# Patient Record
Sex: MALE | Race: WHITE | NOT HISPANIC OR LATINO | Employment: UNEMPLOYED | URBAN - NONMETROPOLITAN AREA
[De-identification: names, ages, dates, MRNs, and addresses within clinical notes are randomized per-mention and may not be internally consistent; named-entity substitution may affect disease eponyms.]

---

## 2017-01-27 ENCOUNTER — APPOINTMENT (OUTPATIENT)
Dept: LAB | Facility: MEDICAL CENTER | Age: 52
End: 2017-01-27
Payer: MEDICARE

## 2017-01-27 ENCOUNTER — TRANSCRIBE ORDERS (OUTPATIENT)
Dept: LAB | Facility: MEDICAL CENTER | Age: 52
End: 2017-01-27

## 2017-01-27 ENCOUNTER — ALLSCRIPTS OFFICE VISIT (OUTPATIENT)
Dept: OTHER | Facility: OTHER | Age: 52
End: 2017-01-27

## 2017-01-27 DIAGNOSIS — R00.0 TACHYCARDIA: ICD-10-CM

## 2017-01-27 LAB
ANION GAP SERPL CALCULATED.3IONS-SCNC: 8 MMOL/L (ref 4–13)
BUN SERPL-MCNC: 11 MG/DL (ref 5–25)
CALCIUM SERPL-MCNC: 8.5 MG/DL (ref 8.3–10.1)
CHLORIDE SERPL-SCNC: 110 MMOL/L (ref 100–108)
CO2 SERPL-SCNC: 25 MMOL/L (ref 21–32)
CREAT SERPL-MCNC: 0.96 MG/DL (ref 0.6–1.3)
GFR SERPL CREATININE-BSD FRML MDRD: >60 ML/MIN/1.73SQ M
GLUCOSE SERPL-MCNC: 93 MG/DL (ref 65–140)
POTASSIUM SERPL-SCNC: 4 MMOL/L (ref 3.5–5.3)
SODIUM SERPL-SCNC: 143 MMOL/L (ref 136–145)
T3 SERPL-MCNC: 1.1 NG/ML (ref 0.6–1.8)
T4 FREE SERPL-MCNC: 0.99 NG/DL (ref 0.76–1.46)
TSH SERPL DL<=0.05 MIU/L-ACNC: 2.54 UIU/ML (ref 0.36–3.74)

## 2017-01-27 PROCEDURE — 86618 LYME DISEASE ANTIBODY: CPT

## 2017-01-27 PROCEDURE — 36415 COLL VENOUS BLD VENIPUNCTURE: CPT

## 2017-01-27 PROCEDURE — 84443 ASSAY THYROID STIM HORMONE: CPT

## 2017-01-27 PROCEDURE — 80048 BASIC METABOLIC PNL TOTAL CA: CPT

## 2017-01-27 PROCEDURE — 84439 ASSAY OF FREE THYROXINE: CPT

## 2017-01-27 PROCEDURE — 84480 ASSAY TRIIODOTHYRONINE (T3): CPT

## 2017-01-30 ENCOUNTER — GENERIC CONVERSION - ENCOUNTER (OUTPATIENT)
Dept: OTHER | Facility: OTHER | Age: 52
End: 2017-01-30

## 2017-01-30 LAB
B BURGDOR IGG SER IA-ACNC: 0.15
B BURGDOR IGM SER IA-ACNC: 0.46

## 2017-02-07 ENCOUNTER — HOSPITAL ENCOUNTER (OUTPATIENT)
Dept: NON INVASIVE DIAGNOSTICS | Facility: HOSPITAL | Age: 52
Discharge: HOME | End: 2017-02-07
Attending: FAMILY MEDICINE
Payer: MEDICARE

## 2017-02-07 ENCOUNTER — GENERIC CONVERSION - ENCOUNTER (OUTPATIENT)
Dept: OTHER | Facility: OTHER | Age: 52
End: 2017-02-07

## 2017-02-07 DIAGNOSIS — R00.0 TACHYCARDIA: ICD-10-CM

## 2017-02-07 PROCEDURE — 93226 XTRNL ECG REC<48 HR SCAN A/R: CPT

## 2017-02-07 PROCEDURE — 93306 TTE W/DOPPLER COMPLETE: CPT

## 2017-02-07 PROCEDURE — 93225 XTRNL ECG REC<48 HRS REC: CPT

## 2017-02-10 ENCOUNTER — ALLSCRIPTS OFFICE VISIT (OUTPATIENT)
Dept: OTHER | Facility: OTHER | Age: 52
End: 2017-02-10

## 2017-02-10 ENCOUNTER — GENERIC CONVERSION - ENCOUNTER (OUTPATIENT)
Dept: OTHER | Facility: OTHER | Age: 52
End: 2017-02-10

## 2017-02-24 ENCOUNTER — ALLSCRIPTS OFFICE VISIT (OUTPATIENT)
Dept: OTHER | Facility: OTHER | Age: 52
End: 2017-02-24

## 2017-03-05 ENCOUNTER — APPOINTMENT (EMERGENCY)
Dept: RADIOLOGY | Facility: HOSPITAL | Age: 52
End: 2017-03-05
Payer: MEDICARE

## 2017-03-05 ENCOUNTER — HOSPITAL ENCOUNTER (EMERGENCY)
Facility: HOSPITAL | Age: 52
Discharge: HOME | End: 2017-03-05
Attending: EMERGENCY MEDICINE
Payer: MEDICARE

## 2017-03-05 VITALS
OXYGEN SATURATION: 100 % | RESPIRATION RATE: 19 BRPM | DIASTOLIC BLOOD PRESSURE: 86 MMHG | TEMPERATURE: 97.7 F | HEART RATE: 99 BPM | WEIGHT: 211 LBS | BODY MASS INDEX: 34.06 KG/M2 | SYSTOLIC BLOOD PRESSURE: 128 MMHG

## 2017-03-05 DIAGNOSIS — F41.8 ANXIETY ABOUT HEALTH: ICD-10-CM

## 2017-03-05 DIAGNOSIS — R07.9 CHEST PAIN, UNSPECIFIED TYPE: Primary | ICD-10-CM

## 2017-03-05 DIAGNOSIS — R00.0 SINUS TACHYCARDIA: ICD-10-CM

## 2017-03-05 LAB
ALBUMIN SERPL BCP-MCNC: 4.5 G/DL (ref 3.5–5)
ALP SERPL-CCNC: 84 U/L (ref 46–116)
ALT SERPL W P-5'-P-CCNC: 51 U/L (ref 12–78)
ANION GAP SERPL CALCULATED.3IONS-SCNC: 15 MMOL/L (ref 4–13)
APTT PPP: 29 SECONDS (ref 24–36)
AST SERPL W P-5'-P-CCNC: 21 U/L (ref 5–45)
BASOPHILS # BLD AUTO: 0.04 THOUSANDS/ΜL (ref 0–0.1)
BASOPHILS NFR BLD AUTO: 1 % (ref 0–1)
BILIRUB SERPL-MCNC: 0.6 MG/DL (ref 0.2–1)
BUN SERPL-MCNC: 9 MG/DL (ref 5–25)
CALCIUM SERPL-MCNC: 9.4 MG/DL (ref 8.3–10.1)
CHLORIDE SERPL-SCNC: 104 MMOL/L (ref 100–108)
CO2 SERPL-SCNC: 25 MMOL/L (ref 21–32)
CREAT SERPL-MCNC: 1.02 MG/DL (ref 0.6–1.3)
DEPRECATED D DIMER PPP: <270 NG/ML (FEU) (ref 0–424)
EOSINOPHIL # BLD AUTO: 0.16 THOUSAND/ΜL (ref 0–0.61)
EOSINOPHIL NFR BLD AUTO: 2 % (ref 0–6)
ERYTHROCYTE [DISTWIDTH] IN BLOOD BY AUTOMATED COUNT: 12.9 % (ref 11.6–15.1)
ETHANOL SERPL-MCNC: 38 MG/DL (ref 0–3)
GFR SERPL CREATININE-BSD FRML MDRD: >60 ML/MIN/1.73SQ M
GLUCOSE SERPL-MCNC: 110 MG/DL (ref 65–140)
GLUCOSE SERPL-MCNC: 126 MG/DL (ref 65–140)
HCT VFR BLD AUTO: 47.7 % (ref 36.5–49.3)
HGB BLD-MCNC: 16.5 G/DL (ref 12–17)
HOLD SPECIMEN: NORMAL
INR PPP: 1.05 (ref 0.86–1.16)
LYMPHOCYTES # BLD AUTO: 2.39 THOUSANDS/ΜL (ref 0.6–4.47)
LYMPHOCYTES NFR BLD AUTO: 31 % (ref 14–44)
MCH RBC QN AUTO: 29.6 PG (ref 26.8–34.3)
MCHC RBC AUTO-ENTMCNC: 34.6 G/DL (ref 31.4–37.4)
MCV RBC AUTO: 86 FL (ref 82–98)
MONOCYTES # BLD AUTO: 0.82 THOUSAND/ΜL (ref 0.17–1.22)
MONOCYTES NFR BLD AUTO: 11 % (ref 4–12)
NEUTROPHILS # BLD AUTO: 4.24 THOUSANDS/ΜL (ref 1.85–7.62)
NEUTS SEG NFR BLD AUTO: 55 % (ref 43–75)
PLATELET # BLD AUTO: 239 THOUSANDS/UL (ref 149–390)
PMV BLD AUTO: 10.4 FL (ref 8.9–12.7)
POTASSIUM SERPL-SCNC: 3.6 MMOL/L (ref 3.5–5.3)
PROT SERPL-MCNC: 8.9 G/DL (ref 6.4–8.2)
PROTHROMBIN TIME: 13.4 SECONDS (ref 12–14.3)
RBC # BLD AUTO: 5.58 MILLION/UL (ref 3.88–5.62)
SODIUM SERPL-SCNC: 144 MMOL/L (ref 136–145)
TROPONIN I SERPL-MCNC: <0.02 NG/ML
WBC # BLD AUTO: 7.65 THOUSAND/UL (ref 4.31–10.16)

## 2017-03-05 PROCEDURE — 99285 EMERGENCY DEPT VISIT HI MDM: CPT

## 2017-03-05 PROCEDURE — 71020 HB CHEST X-RAY 2VW FRONTAL&LATL: CPT

## 2017-03-05 PROCEDURE — 85025 COMPLETE CBC W/AUTO DIFF WBC: CPT | Performed by: EMERGENCY MEDICINE

## 2017-03-05 PROCEDURE — 96361 HYDRATE IV INFUSION ADD-ON: CPT

## 2017-03-05 PROCEDURE — 36415 COLL VENOUS BLD VENIPUNCTURE: CPT | Performed by: EMERGENCY MEDICINE

## 2017-03-05 PROCEDURE — 96365 THER/PROPH/DIAG IV INF INIT: CPT

## 2017-03-05 PROCEDURE — 85730 THROMBOPLASTIN TIME PARTIAL: CPT | Performed by: EMERGENCY MEDICINE

## 2017-03-05 PROCEDURE — 80053 COMPREHEN METABOLIC PANEL: CPT | Performed by: EMERGENCY MEDICINE

## 2017-03-05 PROCEDURE — 96375 TX/PRO/DX INJ NEW DRUG ADDON: CPT

## 2017-03-05 PROCEDURE — 84484 ASSAY OF TROPONIN QUANT: CPT | Performed by: EMERGENCY MEDICINE

## 2017-03-05 PROCEDURE — 82948 REAGENT STRIP/BLOOD GLUCOSE: CPT

## 2017-03-05 PROCEDURE — 93005 ELECTROCARDIOGRAM TRACING: CPT | Performed by: EMERGENCY MEDICINE

## 2017-03-05 PROCEDURE — 80320 DRUG SCREEN QUANTALCOHOLS: CPT | Performed by: EMERGENCY MEDICINE

## 2017-03-05 PROCEDURE — 85379 FIBRIN DEGRADATION QUANT: CPT | Performed by: EMERGENCY MEDICINE

## 2017-03-05 PROCEDURE — 85610 PROTHROMBIN TIME: CPT | Performed by: EMERGENCY MEDICINE

## 2017-03-05 RX ORDER — LORAZEPAM 2 MG/ML
0.5 INJECTION INTRAMUSCULAR ONCE
Status: COMPLETED | OUTPATIENT
Start: 2017-03-05 | End: 2017-03-05

## 2017-03-05 RX ORDER — SODIUM CHLORIDE 9 MG/ML
125 INJECTION, SOLUTION INTRAVENOUS CONTINUOUS
Status: DISCONTINUED | OUTPATIENT
Start: 2017-03-05 | End: 2017-03-05 | Stop reason: HOSPADM

## 2017-03-05 RX ORDER — ONDANSETRON 2 MG/ML
4 INJECTION INTRAMUSCULAR; INTRAVENOUS ONCE
Status: COMPLETED | OUTPATIENT
Start: 2017-03-05 | End: 2017-03-05

## 2017-03-05 RX ORDER — OMEPRAZOLE 40 MG/1
40 CAPSULE, DELAYED RELEASE ORAL DAILY
COMMUNITY
End: 2017-05-01

## 2017-03-05 RX ORDER — THIAMINE HYDROCHLORIDE 100 MG/ML
100 INJECTION, SOLUTION INTRAMUSCULAR; INTRAVENOUS ONCE
Status: DISCONTINUED | OUTPATIENT
Start: 2017-03-05 | End: 2017-03-05

## 2017-03-05 RX ADMIN — THIAMINE HYDROCHLORIDE 100 MG: 100 INJECTION, SOLUTION INTRAMUSCULAR; INTRAVENOUS at 08:23

## 2017-03-05 RX ADMIN — LORAZEPAM 0.5 MG: 2 INJECTION INTRAMUSCULAR; INTRAVENOUS at 08:05

## 2017-03-05 RX ADMIN — ONDANSETRON 4 MG: 2 INJECTION INTRAMUSCULAR; INTRAVENOUS at 07:54

## 2017-03-05 RX ADMIN — SODIUM CHLORIDE 125 ML/HR: 0.9 INJECTION, SOLUTION INTRAVENOUS at 07:54

## 2017-03-06 ENCOUNTER — HOSPITAL ENCOUNTER (EMERGENCY)
Facility: HOSPITAL | Age: 52
Discharge: HOME | End: 2017-03-06
Attending: EMERGENCY MEDICINE | Admitting: EMERGENCY MEDICINE
Payer: MEDICARE

## 2017-03-06 VITALS
RESPIRATION RATE: 18 BRPM | HEART RATE: 68 BPM | DIASTOLIC BLOOD PRESSURE: 76 MMHG | SYSTOLIC BLOOD PRESSURE: 148 MMHG | OXYGEN SATURATION: 100 % | BODY MASS INDEX: 33.45 KG/M2 | TEMPERATURE: 98.6 F | HEIGHT: 66 IN | WEIGHT: 208.11 LBS

## 2017-03-06 DIAGNOSIS — F41.9 ANXIETY: Primary | ICD-10-CM

## 2017-03-06 LAB
ANION GAP SERPL CALCULATED.3IONS-SCNC: 12 MMOL/L (ref 4–13)
ATRIAL RATE: 118 BPM
BASOPHILS # BLD AUTO: 0.04 THOUSANDS/ΜL (ref 0–0.1)
BASOPHILS NFR BLD AUTO: 1 % (ref 0–1)
BUN SERPL-MCNC: 12 MG/DL (ref 5–25)
CALCIUM SERPL-MCNC: 9.8 MG/DL (ref 8.3–10.1)
CHLORIDE SERPL-SCNC: 105 MMOL/L (ref 100–108)
CO2 SERPL-SCNC: 25 MMOL/L (ref 21–32)
CREAT SERPL-MCNC: 1.04 MG/DL (ref 0.6–1.3)
EOSINOPHIL # BLD AUTO: 0.28 THOUSAND/ΜL (ref 0–0.61)
EOSINOPHIL NFR BLD AUTO: 4 % (ref 0–6)
ERYTHROCYTE [DISTWIDTH] IN BLOOD BY AUTOMATED COUNT: 13.2 % (ref 11.6–15.1)
GFR SERPL CREATININE-BSD FRML MDRD: >60 ML/MIN/1.73SQ M
GLUCOSE SERPL-MCNC: 106 MG/DL (ref 65–140)
GLUCOSE SERPL-MCNC: 108 MG/DL (ref 65–140)
HCT VFR BLD AUTO: 48.4 % (ref 36.5–49.3)
HGB BLD-MCNC: 16.5 G/DL (ref 12–17)
LYMPHOCYTES # BLD AUTO: 2.37 THOUSANDS/ΜL (ref 0.6–4.47)
LYMPHOCYTES NFR BLD AUTO: 31 % (ref 14–44)
MCH RBC QN AUTO: 29.5 PG (ref 26.8–34.3)
MCHC RBC AUTO-ENTMCNC: 34.1 G/DL (ref 31.4–37.4)
MCV RBC AUTO: 86 FL (ref 82–98)
MONOCYTES # BLD AUTO: 0.92 THOUSAND/ΜL (ref 0.17–1.22)
MONOCYTES NFR BLD AUTO: 12 % (ref 4–12)
NEUTROPHILS # BLD AUTO: 4.15 THOUSANDS/ΜL (ref 1.85–7.62)
NEUTS SEG NFR BLD AUTO: 52 % (ref 43–75)
P AXIS: 59 DEGREES
PLATELET # BLD AUTO: 202 THOUSANDS/UL (ref 149–390)
PMV BLD AUTO: 10.9 FL (ref 8.9–12.7)
POTASSIUM SERPL-SCNC: 5.4 MMOL/L (ref 3.5–5.3)
PR INTERVAL: 168 MS
QRS AXIS: 56 DEGREES
QRSD INTERVAL: 70 MS
QT INTERVAL: 316 MS
QTC INTERVAL: 442 MS
RBC # BLD AUTO: 5.6 MILLION/UL (ref 3.88–5.62)
SODIUM SERPL-SCNC: 142 MMOL/L (ref 136–145)
T WAVE AXIS: 57 DEGREES
TROPONIN I SERPL-MCNC: <0.02 NG/ML
TSH SERPL DL<=0.05 MIU/L-ACNC: 2.35 UIU/ML (ref 0.36–3.74)
VENTRICULAR RATE: 118 BPM
WBC # BLD AUTO: 7.76 THOUSAND/UL (ref 4.31–10.16)

## 2017-03-06 PROCEDURE — 84484 ASSAY OF TROPONIN QUANT: CPT | Performed by: EMERGENCY MEDICINE

## 2017-03-06 PROCEDURE — 93005 ELECTROCARDIOGRAM TRACING: CPT | Performed by: EMERGENCY MEDICINE

## 2017-03-06 PROCEDURE — 80048 BASIC METABOLIC PNL TOTAL CA: CPT | Performed by: EMERGENCY MEDICINE

## 2017-03-06 PROCEDURE — 99285 EMERGENCY DEPT VISIT HI MDM: CPT

## 2017-03-06 PROCEDURE — 82948 REAGENT STRIP/BLOOD GLUCOSE: CPT

## 2017-03-06 PROCEDURE — 96374 THER/PROPH/DIAG INJ IV PUSH: CPT

## 2017-03-06 PROCEDURE — 85025 COMPLETE CBC W/AUTO DIFF WBC: CPT | Performed by: EMERGENCY MEDICINE

## 2017-03-06 PROCEDURE — 84443 ASSAY THYROID STIM HORMONE: CPT | Performed by: EMERGENCY MEDICINE

## 2017-03-06 PROCEDURE — 36415 COLL VENOUS BLD VENIPUNCTURE: CPT | Performed by: EMERGENCY MEDICINE

## 2017-03-06 RX ORDER — LORAZEPAM 2 MG/ML
1 INJECTION INTRAMUSCULAR ONCE
Status: COMPLETED | OUTPATIENT
Start: 2017-03-06 | End: 2017-03-06

## 2017-03-06 RX ORDER — LORAZEPAM 1 MG/1
1 TABLET ORAL EVERY 6 HOURS PRN
Qty: 20 TABLET | Refills: 0 | Status: SHIPPED | OUTPATIENT
Start: 2017-03-06 | End: 2017-05-01

## 2017-03-06 RX ADMIN — LORAZEPAM 1 MG: 2 INJECTION, SOLUTION INTRAMUSCULAR; INTRAVENOUS at 14:39

## 2017-03-07 ENCOUNTER — ALLSCRIPTS OFFICE VISIT (OUTPATIENT)
Dept: OTHER | Facility: OTHER | Age: 52
End: 2017-03-07

## 2017-03-07 DIAGNOSIS — R73.09 OTHER ABNORMAL GLUCOSE: ICD-10-CM

## 2017-03-07 DIAGNOSIS — Z12.5 ENCOUNTER FOR SCREENING FOR MALIGNANT NEOPLASM OF PROSTATE: ICD-10-CM

## 2017-03-07 DIAGNOSIS — E78.5 HYPERLIPIDEMIA: ICD-10-CM

## 2017-03-07 LAB
ATRIAL RATE: 77 BPM
P AXIS: 59 DEGREES
PR INTERVAL: 154 MS
QRS AXIS: 79 DEGREES
QRSD INTERVAL: 80 MS
QT INTERVAL: 384 MS
QTC INTERVAL: 434 MS
T WAVE AXIS: 16 DEGREES
VENTRICULAR RATE: 77 BPM

## 2017-03-08 ENCOUNTER — ALLSCRIPTS OFFICE VISIT (OUTPATIENT)
Dept: OTHER | Facility: OTHER | Age: 52
End: 2017-03-08

## 2017-03-08 ENCOUNTER — HOSPITAL ENCOUNTER (EMERGENCY)
Facility: HOSPITAL | Age: 52
Discharge: HOME | End: 2017-03-08
Attending: EMERGENCY MEDICINE | Admitting: EMERGENCY MEDICINE
Payer: MEDICARE

## 2017-03-08 VITALS
SYSTOLIC BLOOD PRESSURE: 108 MMHG | HEART RATE: 51 BPM | OXYGEN SATURATION: 99 % | HEIGHT: 66 IN | WEIGHT: 211 LBS | RESPIRATION RATE: 16 BRPM | BODY MASS INDEX: 33.91 KG/M2 | DIASTOLIC BLOOD PRESSURE: 66 MMHG | TEMPERATURE: 98.7 F

## 2017-03-08 DIAGNOSIS — F41.0 PANIC DISORDER: Primary | ICD-10-CM

## 2017-03-08 LAB
ANION GAP SERPL CALCULATED.3IONS-SCNC: 10 MMOL/L (ref 4–13)
BASOPHILS # BLD AUTO: 0.07 THOUSANDS/ΜL (ref 0–0.1)
BASOPHILS NFR BLD AUTO: 1 % (ref 0–1)
BUN SERPL-MCNC: 15 MG/DL (ref 5–25)
CALCIUM SERPL-MCNC: 9.2 MG/DL (ref 8.3–10.1)
CHLORIDE SERPL-SCNC: 107 MMOL/L (ref 100–108)
CO2 SERPL-SCNC: 26 MMOL/L (ref 21–32)
CREAT SERPL-MCNC: 1.12 MG/DL (ref 0.6–1.3)
EOSINOPHIL # BLD AUTO: 0.35 THOUSAND/ΜL (ref 0–0.61)
EOSINOPHIL NFR BLD AUTO: 5 % (ref 0–6)
ERYTHROCYTE [DISTWIDTH] IN BLOOD BY AUTOMATED COUNT: 13.1 % (ref 11.6–15.1)
GFR SERPL CREATININE-BSD FRML MDRD: >60 ML/MIN/1.73SQ M
GLUCOSE SERPL-MCNC: 112 MG/DL (ref 65–140)
HCT VFR BLD AUTO: 45.8 % (ref 36.5–49.3)
HGB BLD-MCNC: 15.5 G/DL (ref 12–17)
HOLD SPECIMEN: NORMAL
LYMPHOCYTES # BLD AUTO: 2.73 THOUSANDS/ΜL (ref 0.6–4.47)
LYMPHOCYTES NFR BLD AUTO: 37 % (ref 14–44)
MAGNESIUM SERPL-MCNC: 2.1 MG/DL (ref 1.6–2.6)
MCH RBC QN AUTO: 29.8 PG (ref 26.8–34.3)
MCHC RBC AUTO-ENTMCNC: 33.8 G/DL (ref 31.4–37.4)
MCV RBC AUTO: 88 FL (ref 82–98)
MONOCYTES # BLD AUTO: 1 THOUSAND/ΜL (ref 0.17–1.22)
MONOCYTES NFR BLD AUTO: 14 % (ref 4–12)
NEUTROPHILS # BLD AUTO: 3.27 THOUSANDS/ΜL (ref 1.85–7.62)
NEUTS SEG NFR BLD AUTO: 43 % (ref 43–75)
PHOSPHATE SERPL-MCNC: 2.9 MG/DL (ref 2.7–4.5)
PLATELET # BLD AUTO: 193 THOUSANDS/UL (ref 149–390)
PMV BLD AUTO: 10.8 FL (ref 8.9–12.7)
POTASSIUM SERPL-SCNC: 4.3 MMOL/L (ref 3.5–5.3)
RBC # BLD AUTO: 5.21 MILLION/UL (ref 3.88–5.62)
SODIUM SERPL-SCNC: 143 MMOL/L (ref 136–145)
T4 FREE SERPL-MCNC: 0.96 NG/DL (ref 0.76–1.46)
TROPONIN I SERPL-MCNC: <0.02 NG/ML
WBC # BLD AUTO: 7.42 THOUSAND/UL (ref 4.31–10.16)

## 2017-03-08 PROCEDURE — 83735 ASSAY OF MAGNESIUM: CPT | Performed by: EMERGENCY MEDICINE

## 2017-03-08 PROCEDURE — 84439 ASSAY OF FREE THYROXINE: CPT | Performed by: EMERGENCY MEDICINE

## 2017-03-08 PROCEDURE — 84484 ASSAY OF TROPONIN QUANT: CPT | Performed by: EMERGENCY MEDICINE

## 2017-03-08 PROCEDURE — 36415 COLL VENOUS BLD VENIPUNCTURE: CPT

## 2017-03-08 PROCEDURE — 84100 ASSAY OF PHOSPHORUS: CPT | Performed by: EMERGENCY MEDICINE

## 2017-03-08 PROCEDURE — 85025 COMPLETE CBC W/AUTO DIFF WBC: CPT | Performed by: EMERGENCY MEDICINE

## 2017-03-08 PROCEDURE — 99285 EMERGENCY DEPT VISIT HI MDM: CPT

## 2017-03-08 PROCEDURE — 93005 ELECTROCARDIOGRAM TRACING: CPT

## 2017-03-08 PROCEDURE — 80048 BASIC METABOLIC PNL TOTAL CA: CPT | Performed by: EMERGENCY MEDICINE

## 2017-03-08 RX ORDER — LORAZEPAM 2 MG/ML
1 INJECTION INTRAMUSCULAR ONCE
Status: DISCONTINUED | OUTPATIENT
Start: 2017-03-08 | End: 2017-03-08 | Stop reason: HOSPADM

## 2017-03-09 LAB
ATRIAL RATE: 58 BPM
P AXIS: 45 DEGREES
PR INTERVAL: 156 MS
QRS AXIS: 47 DEGREES
QRSD INTERVAL: 82 MS
QT INTERVAL: 424 MS
QTC INTERVAL: 416 MS
T WAVE AXIS: 42 DEGREES
VENTRICULAR RATE: 58 BPM

## 2017-03-30 ENCOUNTER — ANESTHESIA EVENT (OUTPATIENT)
Dept: NON INVASIVE DIAGNOSTICS | Facility: HOSPITAL | Age: 52
End: 2017-03-30
Payer: MEDICARE

## 2017-03-31 ENCOUNTER — HOSPITAL ENCOUNTER (OUTPATIENT)
Dept: NON INVASIVE DIAGNOSTICS | Facility: HOSPITAL | Age: 52
Discharge: HOME | End: 2017-03-31
Attending: INTERNAL MEDICINE | Admitting: INTERNAL MEDICINE
Payer: MEDICARE

## 2017-03-31 ENCOUNTER — ANESTHESIA (OUTPATIENT)
Dept: NON INVASIVE DIAGNOSTICS | Facility: HOSPITAL | Age: 52
End: 2017-03-31
Payer: MEDICARE

## 2017-03-31 VITALS
RESPIRATION RATE: 16 BRPM | OXYGEN SATURATION: 96 % | BODY MASS INDEX: 33.91 KG/M2 | WEIGHT: 211 LBS | SYSTOLIC BLOOD PRESSURE: 116 MMHG | HEIGHT: 66 IN | HEART RATE: 88 BPM | DIASTOLIC BLOOD PRESSURE: 84 MMHG | TEMPERATURE: 98.1 F

## 2017-03-31 DIAGNOSIS — I47.1 SVT (SUPRAVENTRICULAR TACHYCARDIA) (HCC): ICD-10-CM

## 2017-03-31 LAB
ANION GAP SERPL CALCULATED.3IONS-SCNC: 7 MMOL/L (ref 4–13)
ATRIAL RATE: 57 BPM
BASOPHILS # BLD AUTO: 0.05 THOUSANDS/ΜL (ref 0–0.1)
BASOPHILS NFR BLD AUTO: 1 % (ref 0–1)
BUN SERPL-MCNC: 17 MG/DL (ref 5–25)
CALCIUM SERPL-MCNC: 8.9 MG/DL (ref 8.3–10.1)
CHLORIDE SERPL-SCNC: 109 MMOL/L (ref 100–108)
CO2 SERPL-SCNC: 27 MMOL/L (ref 21–32)
CREAT SERPL-MCNC: 1.01 MG/DL (ref 0.6–1.3)
EOSINOPHIL # BLD AUTO: 0.4 THOUSAND/ΜL (ref 0–0.61)
EOSINOPHIL NFR BLD AUTO: 6 % (ref 0–6)
ERYTHROCYTE [DISTWIDTH] IN BLOOD BY AUTOMATED COUNT: 13.4 % (ref 11.6–15.1)
GFR SERPL CREATININE-BSD FRML MDRD: >60 ML/MIN/1.73SQ M
GLUCOSE P FAST SERPL-MCNC: 107 MG/DL (ref 65–99)
GLUCOSE SERPL-MCNC: 107 MG/DL (ref 65–140)
HCT VFR BLD AUTO: 41.9 % (ref 36.5–49.3)
HGB BLD-MCNC: 14.1 G/DL (ref 12–17)
LYMPHOCYTES # BLD AUTO: 2.35 THOUSANDS/ΜL (ref 0.6–4.47)
LYMPHOCYTES NFR BLD AUTO: 32 % (ref 14–44)
MAGNESIUM SERPL-MCNC: 2.5 MG/DL (ref 1.6–2.6)
MCH RBC QN AUTO: 29.4 PG (ref 26.8–34.3)
MCHC RBC AUTO-ENTMCNC: 33.7 G/DL (ref 31.4–37.4)
MCV RBC AUTO: 88 FL (ref 82–98)
MONOCYTES # BLD AUTO: 0.91 THOUSAND/ΜL (ref 0.17–1.22)
MONOCYTES NFR BLD AUTO: 13 % (ref 4–12)
NEUTROPHILS # BLD AUTO: 3.53 THOUSANDS/ΜL (ref 1.85–7.62)
NEUTS SEG NFR BLD AUTO: 48 % (ref 43–75)
NRBC BLD AUTO-RTO: 0 /100 WBCS
P AXIS: 40 DEGREES
PLATELET # BLD AUTO: 205 THOUSANDS/UL (ref 149–390)
PMV BLD AUTO: 10.3 FL (ref 8.9–12.7)
POTASSIUM SERPL-SCNC: 4.5 MMOL/L (ref 3.5–5.3)
PR INTERVAL: 164 MS
QRS AXIS: 21 DEGREES
QRSD INTERVAL: 84 MS
QT INTERVAL: 430 MS
QTC INTERVAL: 418 MS
RBC # BLD AUTO: 4.79 MILLION/UL (ref 3.88–5.62)
SODIUM SERPL-SCNC: 143 MMOL/L (ref 136–145)
T WAVE AXIS: 26 DEGREES
VENTRICULAR RATE: 57 BPM
WBC # BLD AUTO: 7.27 THOUSAND/UL (ref 4.31–10.16)

## 2017-03-31 PROCEDURE — C1730 CATH, EP, 19 OR FEW ELECT: HCPCS | Performed by: INTERNAL MEDICINE

## 2017-03-31 PROCEDURE — 80048 BASIC METABOLIC PNL TOTAL CA: CPT | Performed by: PHYSICIAN ASSISTANT

## 2017-03-31 PROCEDURE — 93623 PRGRMD STIMJ&PACG IV RX NFS: CPT | Performed by: INTERNAL MEDICINE

## 2017-03-31 PROCEDURE — 93621 COMP EP EVL L PAC&REC C SINS: CPT | Performed by: INTERNAL MEDICINE

## 2017-03-31 PROCEDURE — 85025 COMPLETE CBC W/AUTO DIFF WBC: CPT | Performed by: PHYSICIAN ASSISTANT

## 2017-03-31 PROCEDURE — C1733 CATH, EP, OTHR THAN COOL-TIP: HCPCS | Performed by: INTERNAL MEDICINE

## 2017-03-31 PROCEDURE — 93005 ELECTROCARDIOGRAM TRACING: CPT | Performed by: PHYSICIAN ASSISTANT

## 2017-03-31 PROCEDURE — C1894 INTRO/SHEATH, NON-LASER: HCPCS | Performed by: INTERNAL MEDICINE

## 2017-03-31 PROCEDURE — C1893 INTRO/SHEATH, FIXED,NON-PEEL: HCPCS | Performed by: INTERNAL MEDICINE

## 2017-03-31 PROCEDURE — 83735 ASSAY OF MAGNESIUM: CPT | Performed by: PHYSICIAN ASSISTANT

## 2017-03-31 PROCEDURE — 93613 INTRACARDIAC EPHYS 3D MAPG: CPT | Performed by: INTERNAL MEDICINE

## 2017-03-31 PROCEDURE — 93653 COMPRE EP EVAL TX SVT: CPT | Performed by: INTERNAL MEDICINE

## 2017-03-31 RX ORDER — LIDOCAINE HYDROCHLORIDE 10 MG/ML
INJECTION, SOLUTION INFILTRATION; PERINEURAL CODE/TRAUMA/SEDATION MEDICATION
Status: COMPLETED | OUTPATIENT
Start: 2017-03-31 | End: 2017-03-31

## 2017-03-31 RX ORDER — PROPOFOL 10 MG/ML
INJECTION, EMULSION INTRAVENOUS AS NEEDED
Status: DISCONTINUED | OUTPATIENT
Start: 2017-03-31 | End: 2017-03-31 | Stop reason: SURG

## 2017-03-31 RX ORDER — MIDAZOLAM HYDROCHLORIDE 1 MG/ML
INJECTION INTRAMUSCULAR; INTRAVENOUS AS NEEDED
Status: DISCONTINUED | OUTPATIENT
Start: 2017-03-31 | End: 2017-03-31 | Stop reason: SURG

## 2017-03-31 RX ORDER — KETAMINE HYDROCHLORIDE 50 MG/ML
INJECTION, SOLUTION, CONCENTRATE INTRAMUSCULAR; INTRAVENOUS AS NEEDED
Status: DISCONTINUED | OUTPATIENT
Start: 2017-03-31 | End: 2017-03-31 | Stop reason: SURG

## 2017-03-31 RX ORDER — GLYCOPYRROLATE 0.2 MG/ML
INJECTION INTRAMUSCULAR; INTRAVENOUS AS NEEDED
Status: DISCONTINUED | OUTPATIENT
Start: 2017-03-31 | End: 2017-03-31 | Stop reason: SURG

## 2017-03-31 RX ORDER — PROPOFOL 10 MG/ML
INJECTION, EMULSION INTRAVENOUS CONTINUOUS PRN
Status: DISCONTINUED | OUTPATIENT
Start: 2017-03-31 | End: 2017-03-31 | Stop reason: SURG

## 2017-03-31 RX ORDER — ACETAMINOPHEN 325 MG/1
650 TABLET ORAL EVERY 4 HOURS PRN
Status: DISCONTINUED | OUTPATIENT
Start: 2017-03-31 | End: 2017-03-31 | Stop reason: HOSPADM

## 2017-03-31 RX ORDER — SODIUM CHLORIDE 9 MG/ML
50 INJECTION, SOLUTION INTRAVENOUS CONTINUOUS
Status: DISCONTINUED | OUTPATIENT
Start: 2017-03-31 | End: 2017-03-31 | Stop reason: HOSPADM

## 2017-03-31 RX ORDER — CLORAZEPATE DIPOTASSIUM 7.5 MG/1
7.5 TABLET ORAL 2 TIMES DAILY
COMMUNITY
End: 2017-05-01

## 2017-03-31 RX ORDER — OXYCODONE HYDROCHLORIDE AND ACETAMINOPHEN 5; 325 MG/1; MG/1
1 TABLET ORAL EVERY 4 HOURS PRN
Status: DISCONTINUED | OUTPATIENT
Start: 2017-03-31 | End: 2017-03-31 | Stop reason: HOSPADM

## 2017-03-31 RX ADMIN — KETAMINE HYDROCHLORIDE 10 MG: 50 INJECTION, SOLUTION INTRAMUSCULAR; INTRAVENOUS at 09:10

## 2017-03-31 RX ADMIN — LIDOCAINE HYDROCHLORIDE 8 ML: 10 INJECTION, SOLUTION INFILTRATION; PERINEURAL at 08:19

## 2017-03-31 RX ADMIN — KETAMINE HYDROCHLORIDE 10 MG: 50 INJECTION, SOLUTION INTRAMUSCULAR; INTRAVENOUS at 08:25

## 2017-03-31 RX ADMIN — GLYCOPYRROLATE 0.1 MG: 0.2 INJECTION INTRAMUSCULAR; INTRAVENOUS at 08:03

## 2017-03-31 RX ADMIN — MIDAZOLAM HYDROCHLORIDE 2 MG: 1 INJECTION, SOLUTION INTRAMUSCULAR; INTRAVENOUS at 08:02

## 2017-03-31 RX ADMIN — PROPOFOL 25 MCG/KG/MIN: 10 INJECTION, EMULSION INTRAVENOUS at 08:03

## 2017-03-31 RX ADMIN — KETAMINE HYDROCHLORIDE 10 MG: 50 INJECTION, SOLUTION INTRAMUSCULAR; INTRAVENOUS at 09:34

## 2017-03-31 RX ADMIN — SODIUM CHLORIDE: 0.9 INJECTION, SOLUTION INTRAVENOUS at 09:37

## 2017-03-31 RX ADMIN — KETAMINE HYDROCHLORIDE 5 MG: 50 INJECTION, SOLUTION INTRAMUSCULAR; INTRAVENOUS at 09:05

## 2017-03-31 RX ADMIN — PROPOFOL 40 MG: 10 INJECTION, EMULSION INTRAVENOUS at 09:34

## 2017-03-31 RX ADMIN — DEXTROSE 2 MCG/MIN: 5 SOLUTION INTRAVENOUS at 08:53

## 2017-03-31 RX ADMIN — PROPOFOL 100 MG: 10 INJECTION, EMULSION INTRAVENOUS at 08:03

## 2017-03-31 RX ADMIN — KETAMINE HYDROCHLORIDE 15 MG: 50 INJECTION, SOLUTION INTRAMUSCULAR; INTRAVENOUS at 08:03

## 2017-03-31 RX ADMIN — SODIUM CHLORIDE 50 ML/HR: 0.9 INJECTION, SOLUTION INTRAVENOUS at 07:26

## 2017-03-31 RX ADMIN — KETAMINE HYDROCHLORIDE 50 MG: 50 INJECTION, SOLUTION INTRAMUSCULAR; INTRAVENOUS at 09:59

## 2017-03-31 RX ADMIN — LIDOCAINE HYDROCHLORIDE 9 ML: 10 INJECTION, SOLUTION INFILTRATION; PERINEURAL at 08:19

## 2017-04-04 ENCOUNTER — ALLSCRIPTS OFFICE VISIT (OUTPATIENT)
Dept: OTHER | Facility: OTHER | Age: 52
End: 2017-04-04

## 2017-04-20 ENCOUNTER — ALLSCRIPTS OFFICE VISIT (OUTPATIENT)
Dept: OTHER | Facility: OTHER | Age: 52
End: 2017-04-20

## 2017-05-01 ENCOUNTER — HOSPITAL ENCOUNTER (EMERGENCY)
Facility: HOSPITAL | Age: 52
Discharge: HOME | End: 2017-05-02
Attending: EMERGENCY MEDICINE | Admitting: INTERNAL MEDICINE
Payer: MEDICARE

## 2017-05-01 DIAGNOSIS — R06.00 DYSPNEA AND RESPIRATORY ABNORMALITIES: Primary | ICD-10-CM

## 2017-05-01 DIAGNOSIS — R06.89 DYSPNEA AND RESPIRATORY ABNORMALITIES: Primary | ICD-10-CM

## 2017-05-01 PROCEDURE — 36415 COLL VENOUS BLD VENIPUNCTURE: CPT | Performed by: EMERGENCY MEDICINE

## 2017-05-01 PROCEDURE — 93005 ELECTROCARDIOGRAM TRACING: CPT | Performed by: EMERGENCY MEDICINE

## 2017-05-01 PROCEDURE — 85025 COMPLETE CBC W/AUTO DIFF WBC: CPT | Performed by: EMERGENCY MEDICINE

## 2017-05-01 PROCEDURE — 80048 BASIC METABOLIC PNL TOTAL CA: CPT | Performed by: EMERGENCY MEDICINE

## 2017-05-01 PROCEDURE — 84484 ASSAY OF TROPONIN QUANT: CPT | Performed by: EMERGENCY MEDICINE

## 2017-05-01 RX ORDER — OMEPRAZOLE 20 MG/1
20 CAPSULE, DELAYED RELEASE ORAL DAILY
COMMUNITY
End: 2019-08-12

## 2017-05-02 ENCOUNTER — APPOINTMENT (EMERGENCY)
Dept: RADIOLOGY | Facility: HOSPITAL | Age: 52
End: 2017-05-02
Payer: MEDICARE

## 2017-05-02 ENCOUNTER — APPOINTMENT (EMERGENCY)
Dept: CT IMAGING | Facility: HOSPITAL | Age: 52
End: 2017-05-02
Payer: MEDICARE

## 2017-05-02 VITALS
DIASTOLIC BLOOD PRESSURE: 83 MMHG | TEMPERATURE: 97.6 F | SYSTOLIC BLOOD PRESSURE: 147 MMHG | HEART RATE: 49 BPM | OXYGEN SATURATION: 99 % | RESPIRATION RATE: 18 BRPM | WEIGHT: 230 LBS | BODY MASS INDEX: 37.12 KG/M2

## 2017-05-02 PROBLEM — R06.89 DYSPNEA AND RESPIRATORY ABNORMALITIES: Status: ACTIVE | Noted: 2017-05-02

## 2017-05-02 PROBLEM — R06.00 DYSPNEA AND RESPIRATORY ABNORMALITIES: Status: ACTIVE | Noted: 2017-05-02

## 2017-05-02 LAB
ANION GAP SERPL CALCULATED.3IONS-SCNC: 7 MMOL/L (ref 4–13)
ATRIAL RATE: 74 BPM
BASOPHILS # BLD AUTO: 0.07 THOUSANDS/ΜL (ref 0–0.1)
BASOPHILS NFR BLD AUTO: 1 % (ref 0–1)
BUN SERPL-MCNC: 15 MG/DL (ref 5–25)
CALCIUM SERPL-MCNC: 9 MG/DL (ref 8.3–10.1)
CHLORIDE SERPL-SCNC: 104 MMOL/L (ref 100–108)
CO2 SERPL-SCNC: 25 MMOL/L (ref 21–32)
CREAT SERPL-MCNC: 1.2 MG/DL (ref 0.6–1.3)
EOSINOPHIL # BLD AUTO: 0.36 THOUSAND/ΜL (ref 0–0.61)
EOSINOPHIL NFR BLD AUTO: 4 % (ref 0–6)
ERYTHROCYTE [DISTWIDTH] IN BLOOD BY AUTOMATED COUNT: 13.1 % (ref 11.6–15.1)
GFR SERPL CREATININE-BSD FRML MDRD: >60 ML/MIN/1.73SQ M
GLUCOSE SERPL-MCNC: 136 MG/DL (ref 65–140)
HCT VFR BLD AUTO: 44.5 % (ref 36.5–49.3)
HGB BLD-MCNC: 15.3 G/DL (ref 12–17)
LYMPHOCYTES # BLD AUTO: 3.96 THOUSANDS/ΜL (ref 0.6–4.47)
LYMPHOCYTES NFR BLD AUTO: 41 % (ref 14–44)
MCH RBC QN AUTO: 29.9 PG (ref 26.8–34.3)
MCHC RBC AUTO-ENTMCNC: 34.4 G/DL (ref 31.4–37.4)
MCV RBC AUTO: 87 FL (ref 82–98)
MONOCYTES # BLD AUTO: 1.07 THOUSAND/ΜL (ref 0.17–1.22)
MONOCYTES NFR BLD AUTO: 11 % (ref 4–12)
NEUTROPHILS # BLD AUTO: 4.13 THOUSANDS/ΜL (ref 1.85–7.62)
NEUTS SEG NFR BLD AUTO: 43 % (ref 43–75)
P AXIS: 41 DEGREES
PLATELET # BLD AUTO: 213 THOUSANDS/UL (ref 149–390)
PMV BLD AUTO: 10.6 FL (ref 8.9–12.7)
POTASSIUM SERPL-SCNC: 3.7 MMOL/L (ref 3.5–5.3)
PR INTERVAL: 160 MS
QRS AXIS: 48 DEGREES
QRSD INTERVAL: 78 MS
QT INTERVAL: 386 MS
QTC INTERVAL: 428 MS
RBC # BLD AUTO: 5.11 MILLION/UL (ref 3.88–5.62)
SODIUM SERPL-SCNC: 136 MMOL/L (ref 136–145)
T WAVE AXIS: 65 DEGREES
TROPONIN I SERPL-MCNC: <0.02 NG/ML
VENTRICULAR RATE: 74 BPM
WBC # BLD AUTO: 9.59 THOUSAND/UL (ref 4.31–10.16)

## 2017-05-02 PROCEDURE — 71275 CT ANGIOGRAPHY CHEST: CPT

## 2017-05-02 PROCEDURE — 99285 EMERGENCY DEPT VISIT HI MDM: CPT

## 2017-05-02 PROCEDURE — 71020 HB CHEST X-RAY 2VW FRONTAL&LATL: CPT

## 2017-05-02 RX ORDER — IPRATROPIUM BROMIDE AND ALBUTEROL SULFATE 2.5; .5 MG/3ML; MG/3ML
3 SOLUTION RESPIRATORY (INHALATION)
Status: DISCONTINUED | OUTPATIENT
Start: 2017-05-02 | End: 2017-05-02 | Stop reason: HOSPADM

## 2017-05-02 RX ORDER — INHALER, ASSIST DEVICES
SPACER (EA) MISCELLANEOUS
Qty: 1 EACH | Refills: 0 | Status: SHIPPED | OUTPATIENT
Start: 2017-05-02 | End: 2019-08-12

## 2017-05-02 RX ORDER — IPRATROPIUM BROMIDE AND ALBUTEROL SULFATE 2.5; .5 MG/3ML; MG/3ML
3 SOLUTION RESPIRATORY (INHALATION)
Status: DISCONTINUED | OUTPATIENT
Start: 2017-05-02 | End: 2017-05-02

## 2017-05-02 RX ORDER — ALBUTEROL SULFATE 90 UG/1
2 AEROSOL, METERED RESPIRATORY (INHALATION) EVERY 4 HOURS PRN
Qty: 1 INHALER | Refills: 0 | Status: SHIPPED | OUTPATIENT
Start: 2017-05-02 | End: 2017-06-01

## 2017-05-02 RX ADMIN — IPRATROPIUM BROMIDE AND ALBUTEROL SULFATE 3 ML: .5; 3 SOLUTION RESPIRATORY (INHALATION) at 00:26

## 2017-05-02 RX ADMIN — IOHEXOL 85 ML: 350 INJECTION, SOLUTION INTRAVENOUS at 02:10

## 2017-05-07 ENCOUNTER — APPOINTMENT (EMERGENCY)
Dept: RADIOLOGY | Facility: HOSPITAL | Age: 52
End: 2017-05-07
Payer: MEDICARE

## 2017-05-07 ENCOUNTER — HOSPITAL ENCOUNTER (EMERGENCY)
Facility: HOSPITAL | Age: 52
Discharge: HOME | End: 2017-05-07
Attending: EMERGENCY MEDICINE | Admitting: EMERGENCY MEDICINE
Payer: MEDICARE

## 2017-05-07 VITALS
SYSTOLIC BLOOD PRESSURE: 116 MMHG | BODY MASS INDEX: 35.51 KG/M2 | DIASTOLIC BLOOD PRESSURE: 78 MMHG | WEIGHT: 220 LBS | RESPIRATION RATE: 20 BRPM | TEMPERATURE: 97.4 F | OXYGEN SATURATION: 100 % | HEART RATE: 52 BPM

## 2017-05-07 DIAGNOSIS — R07.9 CHEST PAIN: Primary | ICD-10-CM

## 2017-05-07 DIAGNOSIS — F41.9 ANXIETY: ICD-10-CM

## 2017-05-07 DIAGNOSIS — R00.1 SINUS BRADYCARDIA: ICD-10-CM

## 2017-05-07 LAB
ANION GAP SERPL CALCULATED.3IONS-SCNC: 7 MMOL/L (ref 4–13)
ATRIAL RATE: 44 BPM
BASOPHILS # BLD AUTO: 0.05 THOUSANDS/ΜL (ref 0–0.1)
BASOPHILS NFR BLD AUTO: 1 % (ref 0–1)
BILIRUB UR QL STRIP: NEGATIVE
BUN SERPL-MCNC: 10 MG/DL (ref 5–25)
CALCIUM SERPL-MCNC: 9 MG/DL (ref 8.3–10.1)
CHLORIDE SERPL-SCNC: 110 MMOL/L (ref 100–108)
CLARITY UR: CLEAR
CO2 SERPL-SCNC: 27 MMOL/L (ref 21–32)
COLOR UR: YELLOW
CREAT SERPL-MCNC: 1.1 MG/DL (ref 0.6–1.3)
EOSINOPHIL # BLD AUTO: 0.36 THOUSAND/ΜL (ref 0–0.61)
EOSINOPHIL NFR BLD AUTO: 6 % (ref 0–6)
ERYTHROCYTE [DISTWIDTH] IN BLOOD BY AUTOMATED COUNT: 13.1 % (ref 11.6–15.1)
GFR SERPL CREATININE-BSD FRML MDRD: >60 ML/MIN/1.73SQ M
GLUCOSE SERPL-MCNC: 90 MG/DL (ref 65–140)
GLUCOSE UR STRIP-MCNC: NEGATIVE MG/DL
HCT VFR BLD AUTO: 43.4 % (ref 36.5–49.3)
HGB BLD-MCNC: 14.6 G/DL (ref 12–17)
HGB UR QL STRIP.AUTO: NEGATIVE
HOLD SPECIMEN: NORMAL
KETONES UR STRIP-MCNC: NEGATIVE MG/DL
LEUKOCYTE ESTERASE UR QL STRIP: NEGATIVE
LYMPHOCYTES # BLD AUTO: 2.03 THOUSANDS/ΜL (ref 0.6–4.47)
LYMPHOCYTES NFR BLD AUTO: 34 % (ref 14–44)
MCH RBC QN AUTO: 29.6 PG (ref 26.8–34.3)
MCHC RBC AUTO-ENTMCNC: 33.6 G/DL (ref 31.4–37.4)
MCV RBC AUTO: 88 FL (ref 82–98)
MONOCYTES # BLD AUTO: 0.67 THOUSAND/ΜL (ref 0.17–1.22)
MONOCYTES NFR BLD AUTO: 11 % (ref 4–12)
NEUTROPHILS # BLD AUTO: 2.86 THOUSANDS/ΜL (ref 1.85–7.62)
NEUTS SEG NFR BLD AUTO: 48 % (ref 43–75)
NITRITE UR QL STRIP: NEGATIVE
NRBC BLD AUTO-RTO: 0 /100 WBCS
P AXIS: 63 DEGREES
PH UR STRIP.AUTO: 7.5 [PH] (ref 4.5–8)
PLATELET # BLD AUTO: 198 THOUSANDS/UL (ref 149–390)
PMV BLD AUTO: 10.7 FL (ref 8.9–12.7)
POTASSIUM SERPL-SCNC: 4 MMOL/L (ref 3.5–5.3)
PR INTERVAL: 164 MS
PROT UR STRIP-MCNC: NEGATIVE MG/DL
QRS AXIS: 43 DEGREES
QRSD INTERVAL: 72 MS
QT INTERVAL: 494 MS
QTC INTERVAL: 422 MS
RBC # BLD AUTO: 4.93 MILLION/UL (ref 3.88–5.62)
SODIUM SERPL-SCNC: 144 MMOL/L (ref 136–145)
SP GR UR STRIP.AUTO: 1.02 (ref 1–1.03)
SPECIMEN SOURCE: NORMAL
T WAVE AXIS: 59 DEGREES
TROPONIN I BLD-MCNC: 0 NG/ML (ref 0–0.08)
UROBILINOGEN UR QL STRIP.AUTO: 0.2 E.U./DL
VENTRICULAR RATE: 44 BPM
WBC # BLD AUTO: 5.98 THOUSAND/UL (ref 4.31–10.16)

## 2017-05-07 PROCEDURE — 36415 COLL VENOUS BLD VENIPUNCTURE: CPT | Performed by: EMERGENCY MEDICINE

## 2017-05-07 PROCEDURE — 80048 BASIC METABOLIC PNL TOTAL CA: CPT | Performed by: EMERGENCY MEDICINE

## 2017-05-07 PROCEDURE — 84484 ASSAY OF TROPONIN QUANT: CPT

## 2017-05-07 PROCEDURE — 71020 HB CHEST X-RAY 2VW FRONTAL&LATL: CPT

## 2017-05-07 PROCEDURE — 81003 URINALYSIS AUTO W/O SCOPE: CPT

## 2017-05-07 PROCEDURE — 93005 ELECTROCARDIOGRAM TRACING: CPT | Performed by: EMERGENCY MEDICINE

## 2017-05-07 PROCEDURE — 99285 EMERGENCY DEPT VISIT HI MDM: CPT

## 2017-05-07 PROCEDURE — 85025 COMPLETE CBC W/AUTO DIFF WBC: CPT | Performed by: EMERGENCY MEDICINE

## 2017-05-07 RX ORDER — LORAZEPAM 0.5 MG/1
0.5 TABLET ORAL ONCE
Status: COMPLETED | OUTPATIENT
Start: 2017-05-07 | End: 2017-05-07

## 2017-05-07 RX ADMIN — LORAZEPAM 0.5 MG: 0.5 TABLET ORAL at 16:59

## 2017-06-21 ENCOUNTER — ALLSCRIPTS OFFICE VISIT (OUTPATIENT)
Dept: OTHER | Facility: OTHER | Age: 52
End: 2017-06-21

## 2017-11-01 ENCOUNTER — HOSPITAL ENCOUNTER (EMERGENCY)
Facility: HOSPITAL | Age: 52
Discharge: HOME | End: 2017-11-01
Attending: EMERGENCY MEDICINE | Admitting: EMERGENCY MEDICINE
Payer: MEDICARE

## 2017-11-01 VITALS
BODY MASS INDEX: 34.92 KG/M2 | WEIGHT: 217.31 LBS | DIASTOLIC BLOOD PRESSURE: 68 MMHG | HEIGHT: 66 IN | HEART RATE: 82 BPM | RESPIRATION RATE: 16 BRPM | TEMPERATURE: 97.8 F | OXYGEN SATURATION: 99 % | SYSTOLIC BLOOD PRESSURE: 140 MMHG

## 2017-11-01 DIAGNOSIS — R10.9 ABDOMINAL PAIN: ICD-10-CM

## 2017-11-01 DIAGNOSIS — R19.7 ACUTE DIARRHEA: Primary | ICD-10-CM

## 2017-11-01 LAB
ALBUMIN SERPL BCP-MCNC: 4.1 G/DL (ref 3.5–5)
ALP SERPL-CCNC: 69 U/L (ref 46–116)
ALT SERPL W P-5'-P-CCNC: 53 U/L (ref 12–78)
ANION GAP SERPL CALCULATED.3IONS-SCNC: 11 MMOL/L (ref 4–13)
AST SERPL W P-5'-P-CCNC: 17 U/L (ref 5–45)
BASOPHILS # BLD AUTO: 0.03 THOUSANDS/ΜL (ref 0–0.1)
BASOPHILS NFR BLD AUTO: 0 % (ref 0–1)
BILIRUB SERPL-MCNC: 0.5 MG/DL (ref 0.2–1)
BUN SERPL-MCNC: 14 MG/DL (ref 5–25)
CALCIUM SERPL-MCNC: 9.4 MG/DL (ref 8.3–10.1)
CHLORIDE SERPL-SCNC: 105 MMOL/L (ref 100–108)
CO2 SERPL-SCNC: 27 MMOL/L (ref 21–32)
CREAT SERPL-MCNC: 1.08 MG/DL (ref 0.6–1.3)
EOSINOPHIL # BLD AUTO: 0.13 THOUSAND/ΜL (ref 0–0.61)
EOSINOPHIL NFR BLD AUTO: 1 % (ref 0–6)
ERYTHROCYTE [DISTWIDTH] IN BLOOD BY AUTOMATED COUNT: 13.1 % (ref 11.6–15.1)
GFR SERPL CREATININE-BSD FRML MDRD: 79 ML/MIN/1.73SQ M
GLUCOSE SERPL-MCNC: 129 MG/DL (ref 65–140)
HCT VFR BLD AUTO: 46 % (ref 36.5–49.3)
HGB BLD-MCNC: 15.8 G/DL (ref 12–17)
HOLD SPECIMEN: NORMAL
LYMPHOCYTES # BLD AUTO: 1.1 THOUSANDS/ΜL (ref 0.6–4.47)
LYMPHOCYTES NFR BLD AUTO: 9 % (ref 14–44)
MCH RBC QN AUTO: 29.6 PG (ref 26.8–34.3)
MCHC RBC AUTO-ENTMCNC: 34.3 G/DL (ref 31.4–37.4)
MCV RBC AUTO: 86 FL (ref 82–98)
MONOCYTES # BLD AUTO: 1.25 THOUSAND/ΜL (ref 0.17–1.22)
MONOCYTES NFR BLD AUTO: 11 % (ref 4–12)
NEUTROPHILS # BLD AUTO: 9.26 THOUSANDS/ΜL (ref 1.85–7.62)
NEUTS SEG NFR BLD AUTO: 79 % (ref 43–75)
PLATELET # BLD AUTO: 235 THOUSANDS/UL (ref 149–390)
PMV BLD AUTO: 10.8 FL (ref 8.9–12.7)
POTASSIUM SERPL-SCNC: 4.2 MMOL/L (ref 3.5–5.3)
PROT SERPL-MCNC: 8.4 G/DL (ref 6.4–8.2)
RBC # BLD AUTO: 5.33 MILLION/UL (ref 3.88–5.62)
SODIUM SERPL-SCNC: 143 MMOL/L (ref 136–145)
WBC # BLD AUTO: 11.77 THOUSAND/UL (ref 4.31–10.16)

## 2017-11-01 PROCEDURE — 96374 THER/PROPH/DIAG INJ IV PUSH: CPT

## 2017-11-01 PROCEDURE — 85025 COMPLETE CBC W/AUTO DIFF WBC: CPT | Performed by: EMERGENCY MEDICINE

## 2017-11-01 PROCEDURE — 80053 COMPREHEN METABOLIC PANEL: CPT | Performed by: EMERGENCY MEDICINE

## 2017-11-01 PROCEDURE — 96361 HYDRATE IV INFUSION ADD-ON: CPT

## 2017-11-01 PROCEDURE — 99284 EMERGENCY DEPT VISIT MOD MDM: CPT

## 2017-11-01 RX ORDER — DICYCLOMINE HCL 20 MG
40 TABLET ORAL ONCE
Status: COMPLETED | OUTPATIENT
Start: 2017-11-01 | End: 2017-11-01

## 2017-11-01 RX ORDER — DICYCLOMINE HCL 20 MG
20 TABLET ORAL 2 TIMES DAILY
Qty: 20 TABLET | Refills: 0 | Status: SHIPPED | OUTPATIENT
Start: 2017-11-01 | End: 2019-08-12

## 2017-11-01 RX ORDER — ALBUTEROL SULFATE 90 UG/1
2 AEROSOL, METERED RESPIRATORY (INHALATION) EVERY 6 HOURS PRN
COMMUNITY
End: 2019-08-12

## 2017-11-01 RX ORDER — ONDANSETRON 4 MG/1
4 TABLET, ORALLY DISINTEGRATING ORAL EVERY 6 HOURS PRN
Qty: 20 TABLET | Refills: 0 | Status: SHIPPED | OUTPATIENT
Start: 2017-11-01 | End: 2019-08-12

## 2017-11-01 RX ORDER — ONDANSETRON 2 MG/ML
4 INJECTION INTRAMUSCULAR; INTRAVENOUS ONCE
Status: COMPLETED | OUTPATIENT
Start: 2017-11-01 | End: 2017-11-01

## 2017-11-01 RX ADMIN — DICYCLOMINE HYDROCHLORIDE 40 MG: 20 TABLET ORAL at 16:06

## 2017-11-01 RX ADMIN — ONDANSETRON 4 MG: 2 INJECTION INTRAMUSCULAR; INTRAVENOUS at 16:05

## 2017-11-01 RX ADMIN — SODIUM CHLORIDE 1000 ML: 0.9 INJECTION, SOLUTION INTRAVENOUS at 16:04

## 2017-11-01 NOTE — ED PROVIDER NOTES
History  Chief Complaint   Patient presents with    Abdominal Pain     awoke at 0600 with severe midabdominal pain radiating to lower quadrants and multiple episodes of diarrhea  diarrhea yellow in color  vomited x1 with alot of nausea     Patient complains of sudden onset of mid abdominal cramping pain associated with profuse watery diarrhea and nausea  He had 1 episode of vomiting today  He has not had anything to eat since the symptoms began  The abdominal cramping and diarrhea awoke him from sleep  He thinks he has had 7 episodes since then  He had similar symptoms about a year and half ago but states this is now worse  His son recently had a diarrheal illness that lasted 3 days was not as severe  Denies any recent antibiotic use or exposure to similar sick contacts  He has not added or changed any medications recently  He denies any questionable food consumption  No hematochezia, melena or hematemesis  No change in symptoms w/voiding  Denies f/c, CP, SOB  12 system ROS o/w negative  History provided by:  Patient and medical records  Abdominal Pain   Pain location:  Epigastric and periumbilical  Pain quality: aching, bloating and cramping    Pain radiates to:  Does not radiate  Pain severity:  Mild  Onset quality:  Sudden  Duration:  10 hours  Timing:  Constant  Progression:  Waxing and waning  Chronicity:  New  Context: awakening from sleep and sick contacts    Context: not alcohol use, not diet changes, not eating, not medication withdrawal, not previous surgeries, not recent illness, not recent travel, not retching, not suspicious food intake and not trauma    Relieved by:  None tried  Exacerbated by: Drinking    Ineffective treatments:  None tried  Associated symptoms: anorexia, diarrhea, nausea and vomiting (Once)    Associated symptoms: no belching, no chest pain, no chills, no constipation, no cough, no dysuria, no fatigue, no fever, no hematemesis, no hematochezia, no hematuria, no melena, no shortness of breath and no sore throat    Risk factors: obesity    Risk factors: not elderly, has not had multiple surgeries and no recent hospitalization        Prior to Admission Medications   Prescriptions Last Dose Informant Patient Reported? Taking? Spacer/Aero-Holding Chambers (BREATHERITE YASMANI SPACER ADULT) MISC   No No   Sig: Use with ventolin HFA inhaler   albuterol (PROVENTIL HFA,VENTOLIN HFA) 90 mcg/act inhaler 11/1/2017 at Unknown time  Yes Yes   Sig: Inhale 2 puffs every 6 (six) hours as needed for wheezing   aspirin 81 MG tablet 10/31/2017 at Unknown time  Yes Yes   Sig: Take 81 mg by mouth daily   omeprazole (PriLOSEC) 20 mg delayed release capsule 10/31/2017 at Unknown time  Yes Yes   Sig: Take 20 mg by mouth daily      Facility-Administered Medications: None       Past Medical History:   Diagnosis Date    GERD (gastroesophageal reflux disease)     High cholesterol     Panic disorder     SVT (supraventricular tachycardia) (HCC)        Past Surgical History:   Procedure Laterality Date    AV NODE ABLATION      CYST REMOVAL         No family history on file  I have reviewed and agree with the history as documented  Social History   Substance Use Topics    Smoking status: Former Smoker     Quit date: 11/1/2013    Smokeless tobacco: Never Used    Alcohol use Yes      Comment: social        Review of Systems   Constitutional: Negative for chills, diaphoresis, fatigue and fever  HENT: Negative for congestion, rhinorrhea, sore throat and trouble swallowing  Respiratory: Negative for cough, chest tightness, shortness of breath and wheezing  Cardiovascular: Negative for chest pain, palpitations and leg swelling  Gastrointestinal: Positive for abdominal pain, anorexia, diarrhea, nausea and vomiting (Once)  Negative for constipation, hematemesis, hematochezia and melena  Endocrine: Negative for polydipsia, polyphagia and polyuria     Genitourinary: Negative for discharge, dysuria, flank pain, frequency, hematuria, penile pain, testicular pain and urgency  Musculoskeletal: Negative for arthralgias, back pain, myalgias and neck pain  Skin: Negative for pallor and rash  Neurological: Negative for dizziness, syncope, weakness, light-headedness, numbness and headaches  Hematological: Negative for adenopathy  Psychiatric/Behavioral: Negative for confusion  All other systems reviewed and are negative  Physical Exam  ED Triage Vitals [11/01/17 1533]   Temperature Pulse Respirations Blood Pressure SpO2   97 8 °F (36 6 °C) 86 18 165/80 98 %      Temp Source Heart Rate Source Patient Position - Orthostatic VS BP Location FiO2 (%)   Temporal Monitor Sitting Left arm --      Pain Score       5           Orthostatic Vital Signs  Vitals:    11/01/17 1533   BP: 165/80   Pulse: 86   Patient Position - Orthostatic VS: Sitting       Physical Exam   Constitutional: He is oriented to person, place, and time  He appears well-developed and well-nourished  No distress  HENT:   Head: Normocephalic and atraumatic  Mouth/Throat: Oropharynx is clear and moist    Eyes: EOM are normal  Pupils are equal, round, and reactive to light  Neck: Normal range of motion  Neck supple  Cardiovascular: Normal rate, regular rhythm and normal heart sounds  No murmur heard  Pulmonary/Chest: Effort normal and breath sounds normal  No respiratory distress  He has no wheezes  He has no rales  Abdominal: Soft  Normal appearance and bowel sounds are normal  He exhibits no distension  There is no tenderness  There is no rebound and no guarding  Hernia confirmed negative in the right inguinal area and confirmed negative in the left inguinal area  Genitourinary: Testes normal and penis normal    Musculoskeletal: Normal range of motion  He exhibits no edema or tenderness  Lymphadenopathy:     He has no cervical adenopathy  Neurological: He is alert and oriented to person, place, and time   He has normal reflexes  He exhibits normal muscle tone  Coordination normal    Skin: Skin is warm and dry  Capillary refill takes less than 2 seconds  He is not diaphoretic  No pallor  Psychiatric: He has a normal mood and affect  His behavior is normal  Thought content normal    Vitals reviewed  ED Medications  Medications   ondansetron (ZOFRAN) injection 4 mg (4 mg Intravenous Given 11/1/17 1605)   dicyclomine (BENTYL) tablet 40 mg (40 mg Oral Given 11/1/17 1606)   sodium chloride 0 9 % bolus 1,000 mL (0 mL Intravenous Stopped 11/1/17 1650)       Diagnostic Studies  Results Reviewed     Procedure Component Value Units Date/Time    CMP [[id number]]  (Abnormal) Collected:  11/01/17 1604    Lab Status:  Final result Specimen:  Blood from Arm, Right Updated:  11/01/17 1626     Sodium 143 mmol/L      Potassium 4 2 mmol/L      Chloride 105 mmol/L      CO2 27 mmol/L      Anion Gap 11 mmol/L      BUN 14 mg/dL      Creatinine 1 08 mg/dL      Glucose 129 mg/dL      Calcium 9 4 mg/dL      AST 17 U/L      ALT 53 U/L      Alkaline Phosphatase 69 U/L      Total Protein 8 4 (H) g/dL      Albumin 4 1 g/dL      Total Bilirubin 0 50 mg/dL      eGFR 79 ml/min/1 73sq m     Narrative:         National Kidney Disease Education Program recommendations are as follows:  GFR calculation is accurate only with a steady state creatinine  Chronic Kidney disease less than 60 ml/min/1 73 sq  meters  Kidney failure less than 15 ml/min/1 73 sq  meters      CBC and differential [[id number]]  (Abnormal) Collected:  11/01/17 1604    Lab Status:  Final result Specimen:  Blood from Arm, Right Updated:  11/01/17 1612     WBC 11 77 (H) Thousand/uL      RBC 5 33 Million/uL      Hemoglobin 15 8 g/dL      Hematocrit 46 0 %      MCV 86 fL      MCH 29 6 pg      MCHC 34 3 g/dL      RDW 13 1 %      MPV 10 8 fL      Platelets 057 Thousands/uL      Neutrophils Relative 79 (H) %      Lymphocytes Relative 9 (L) %      Monocytes Relative 11 %      Eosinophils Relative 1 %      Basophils Relative 0 %      Neutrophils Absolute 9 26 (H) Thousands/µL      Lymphocytes Absolute 1 10 Thousands/µL      Monocytes Absolute 1 25 (H) Thousand/µL      Eosinophils Absolute 0 13 Thousand/µL      Basophils Absolute 0 03 Thousands/µL     Watts draw [[id number]] Collected:  11/01/17 1604    Lab Status: In process Specimen:  Blood Updated:  11/01/17 1609    Narrative: The following orders were created for panel order Watts draw  Procedure                               Abnormality         Status                     ---------                               -----------         ------                     Alfrieda Sol Top on YQXS[[id number]]                            In process                 Gold top on IRBV[[id number]]                                  In process                 Green / Black tube on JMBT[[id number]]                        In process                   Please view results for these tests on the individual orders  Stool Enteric Bacterial Panel by PCR [[id number]]     Lab Status:  No result Specimen:  Stool from Rectum                  No orders to display              Procedures  Procedures       Phone Contacts  ED Phone Contact    ED Course  ED Course as of Nov 01 1650 Wed Nov 01, 2017   1628 Available results reviewed with patient  He is feeling better after medications  IV fluids infusing  Awaiting stool sample  1650 Patient has had no further diarrhea while in the emergency department  Treatment is completed the states he does not feel the urge to have additional diarrhea  He further states that he no longer has the abdominal pain  Recommend follow-up with PCP  MDM  Number of Diagnoses or Management Options  Diagnosis management comments: DDx: Abdominal pain/diarrhea/nausea - GI virus, infectious gastroenteritis, abnormal electrolytes, doubt C diff, pancreatitis, biliary disease, bowel obstruction or ischemia    A/P: Will check abdominal labs, urine, treat symptoms, reevaluate for further work up and disposition  Amount and/or Complexity of Data Reviewed  Clinical lab tests: reviewed and ordered  Review and summarize past medical records: yes      CritCare Time    Disposition  Final diagnoses:   Acute diarrhea   Abdominal pain     Time reflects when diagnosis was documented in both MDM as applicable and the Disposition within this note     Time User Action Codes Description Comment    11/1/2017  4:30  [facility] [R19 7] Acute diarrhea     11/1/2017  4:30 PM [address] [place] [address]  [address], [address] [R10 9] Abdominal pain       ED Disposition     ED Disposition Condition Comment    Discharge  Sotero Font discharge to home/self care  Condition at discharge: Stable        Follow-up Information     Follow up With Specialties Details Why Contact Info    Shikha Sutherland DO Family Medicine Schedule an appointment as soon as possible for a visit If symptoms worsen [address] [facility] [address]  [address]  [phone]          Patient's Medications   Discharge Prescriptions    DICYCLOMINE (BENTYL) 20 MG TABLET    Take 1 tablet by mouth 2 (two) times a day       Start Date: 11/1/2017 End Date: --       Order Dose: 20 mg       Quantity: 20 tablet    Refills: 0    ONDANSETRON (ZOFRAN-ODT) 4 MG DISINTEGRATING TABLET    Take 1 tablet by mouth every 6 (six) hours as needed for nausea or vomiting       Start Date: 11/1/2017 End Date: --       Order Dose: 4 mg       Quantity: 20 tablet    Refills: 0     No discharge procedures on file      ED Provider  Electronically Signed by           Manuel Edge DO  11/01/17 0702

## 2017-11-01 NOTE — DISCHARGE INSTRUCTIONS
Acute Diarrhea   WHAT YOU NEED TO KNOW:   Acute diarrhea starts quickly and lasts a short time, usually 1 to 3 days  It can last up to 2 weeks  You may not be able to control your diarrhea  Acute diarrhea usually stops on its own  DISCHARGE INSTRUCTIONS:   Return to the emergency department if:   · You feel confused  · Your heartbeat is faster than normal      · Your eyes look deeply sunken, or you have no tears when you cry  · You urinate less than usual, or your urine is dark yellow  · You have blood or mucus in your stools  · You have severe abdominal pain  · You are unable to drink any liquids  Contact your healthcare provider if:   · Your symptoms do not get better with treatment  · You have a fever higher than 101 3°F (38 5°C)  · You have trouble eating and drinking because you are vomiting  · You are thirsty or have a dry mouth  · Your diarrhea does not get better in 7 days  · You have questions or concerns about your condition or care  Follow up with your healthcare provider as directed:  Write down your questions so you remember to ask them during your visits  Medicines:  · Diarrhea medicine  is an over-the-counter medicine that helps slow or stop your diarrhea  If you take other medicines, talk to your healthcare provider before you take diarrhea medicine  · Antibiotics  may be given to help treat an infection caused by bacteria  · Antiparasitics  may be given to treat an infection caused by parasites  · Take your medicine as directed  Contact your healthcare provider if you think your medicine is not helping or if you have side effects  Tell him of her if you are allergic to any medicine  Keep a list of the medicines, vitamins, and herbs you take  Include the amounts, and when and why you take them  Bring the list or the pill bottles to follow-up visits  Carry your medicine list with you in case of an emergency    Self-care:   · Drink liquids as directed  Liquids will help prevent dehydration caused by diarrhea  Ask your healthcare provider how much liquid to drink each day and which liquids are best for you  You may need to drink an oral rehydration solution (ORS)  An ORS has the right amounts of water, salts, and sugar you need to replace body fluids  You can buy an ORS at most grocery stores and pharmacies  · Eat foods that are easy to digest   Examples include rice, lentils, cereal, bananas, potatoes, and bread  It also includes some fruits (bananas, melon), well-cooked vegetables, and lean meats  Avoid foods high in fiber, fat, and sugar  Also avoid caffeine, alcohol, dairy, and red meat until your diarrhea is gone  Prevent acute diarrhea:   · Wash your hands often  Use soap and water  Wash your hands before you eat or prepare food  Also wash your hands after you use the bathroom  Use an alcohol-based hand gel when soap and water are not available  · Keep bathroom surfaces clean  This helps prevent the spread of germs that cause acute diarrhea  · Wash fruits and vegetables well before you eat them  This can help remove germs that cause diarrhea  If possible, remove the skin from fruits and vegetables, or cook them well before you eat them  · Cook meat as directed  ¨ Cook ground meat  to 160°F      ¨ Cook ground poultry, whole poultry, or cuts of poultry  to at least 165°F  Remove the meat from heat  Let it stand for 3 minutes before you eat it  ¨ Cook whole cuts of meat other than poultry  to at least 145°F  Remove the meat from heat  Let it stand for 3 minutes before you eat it  · Wash dishes that have touched raw meat with hot water and soap  This includes cutting boards, utensils, dishes, and serving containers  · Place raw or cooked meat in the refrigerator as soon as possible  Bacteria can grow in meat that is left at room temperature too long  · Do not eat raw or undercooked oysters, clams, or mussels  These foods may be contaminated and cause infection  · Drink filtered or treated water only when you travel  Do not put ice in your drinks  Drink bottled water whenever possible  © 2017 [address] Information is for End User's use only and may not be sold, redistributed or otherwise used for commercial purposes  All illustrations and images included in CareNotes® are the copyrighted property of A D A M , Inc  or Sergey Leong  The above information is an  only  It is not intended as medical advice for individual conditions or treatments  Talk to your doctor, nurse or pharmacist before following any medical regimen to see if it is safe and effective for you

## 2017-11-02 ENCOUNTER — ALLSCRIPTS OFFICE VISIT (OUTPATIENT)
Dept: OTHER | Facility: OTHER | Age: 52
End: 2017-11-02

## 2017-11-03 NOTE — PROGRESS NOTES
Assessment  1  Viral syndrome (189 99) (B34 9)    Plan  Encounter for screening colonoscopy    · *1 -  [facility]  *  Status: Active -  Retrospective By Protocol Authorization  Requested for: [id number]  Care Summary provided  : Yes    Discussion/Summary    Discussed using supportive measures until Melani Botello feels better  Explained to him that an antibiotic will not help his cold symptoms  The patient was counseled regarding instructions for management,-- risk factor reductions,-- prognosis,-- patient and family education,-- risks and benefits of treatment options,-- importance of compliance with treatment  Possible side effects of new medications were reviewed with the patient/guardian today  The treatment plan was reviewed with the patient/guardian  The patient/guardian understands and agrees with the treatment plan      Chief Complaint  1  Cold Symptoms  Melani Botello is here today with complaints of cold symptoms which started this morning  Yesterday, he had diarrhea and vomiting for which he was evaluated in the ER  He was diagnosed with viral gastroenteritis  As of today, the GI symptoms have resolved  His son has had similar symptoms recently  History of Present Illness  HPI: See chief complaint  Review of Systems    Constitutional: no fever-- and-- no chills  ENT: sore throat-- and-- nasal discharge, but-- no earache  Cardiovascular: no chest pain-- and-- no palpitations  Respiratory: no shortness of breath,-- no cough-- and-- no PND  Gastrointestinal: no abdominal pain,-- no nausea,-- no vomiting,-- no constipation-- and-- no diarrhea  ROS reviewed  Active Problems  1  Anxiety (300 00) (F41 9)   2  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)   3  Catheter Ablation Atrial Supraventricular Tachycardia   4  Dyslipidemia (272 4) (E78 5)   5  Encounter for prostate cancer screening (G06 44) (Z12 5)   6   Encounter for screening colonoscopy (Q69 51) (Z12 11)   7  GERD without esophagitis (530 81) (K21 9)   8  Increased BMI (783 9) (R63 8)   9  Lipoma of skin and subcutaneous tissue (214 1) (D17 30)   10  Panic attack (300 01) (F41 0)   11  Paroxysmal SVT (supraventricular tachycardia) (427 0) (I47 1)   12  Screening for colorectal cancer (V76 51) (Z12 11,Z12 12)   13  Screening for depression (V79 0) (Z13 89)   14  Seasonal allergic rhinitis due to pollen (477 0) (J30 1)   15  Sinus bradycardia (427 89) (R00 1)    Past Medical History  1  History of Abdominal bloating (787 3) (R14 0)   2  History of Abdominal pain (789 00) (R10 9)   3  History of Abnormal glucose (790 29) (R73 09)   4  History of Acute sinusitis (461 9) (J01 90)   5  History of Bilateral impacted cerumen (380 4) (H61 23)   6  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)   7  History of Epidermoid cyst of skin (706 2) (L72 0)   8  History of Fatigue (780 79) (R53 83)   9  History of dizziness (V13 89) (Z87 898)   10  History of nocturia (V15 89) (Z87 898)   11  History of onychomycosis (V12 09) (Z86 19)   12  History of paroxysmal supraventricular tachycardia (V12 59) (Z86 79)   13  History of viral gastroenteritis (V12 09) (Z86 19)   14  History of Impacted cerumen of both ears (380 4) (H61 23)   15  History of Lipoma of skin and subcutaneous tissue (214 1) (D17 30)   16  History of Need for influenza vaccination (V04 81) (Z23)   17  History of Paronychia of finger, unspecified laterality (681 02) (L03 019)   18  History of Screening for neurological condition (V80 09) (Z13 89)   19  History of Skin lesion (709 9) (L98 9)   20  History of Snoring (786 09) (R06 83)   21  History of Tachycardia (785 0) (R00 0)   22  History of Viral gastroenteritis (008 8) (A08 4)   23  History of Witnessed apneic spells (786 03) (R05 81)  Active Problems And Past Medical History Reviewed: The active problems and past medical history were reviewed and updated today        Family History  Mother 1  Family history of Diabetes (250 00) (E11 9)   2  Family history of kidney disease (V18 69) (Z84 1)   3  Family history of liver cancer (V16 0) (Z80 0)   4  Family history of Heart disease (429 9) (I51 9)  Sister    5  Family history of diabetes mellitus (V18 0) (Z83 3)  Family History Reviewed: The family history was reviewed and updated today  Social History   · Always uses seat belt   · Full-time employment   · Never a smoker   · No alcohol use   · No drug use   · Single  The social history was reviewed and updated today  The social history was reviewed and is unchanged  Surgical History  Surgical History Reviewed: The surgical history was reviewed and updated today  Current Meds   1  Aspirin 81 MG TABS; Take 1 tablet daily; Therapy: (Recorded:[id number]) to Recorded   2  Cetirizine HCl - 10 MG Oral Tablet; TAKE 1 TABLET DAILY; Therapy: 21Jun2017 to (Gunner Petty)  Requested for: 21Jun2017 Ordered   3  Fluticasone Propionate 50 MCG/ACT Nasal Suspension; USE 2 SPRAYS IN EACH   NOSTRIL ONCE DAILY; Therapy: 21Jun2017 to (Gunner Petty)  Requested for: 21Jun2017 Ordered   4  Montelukast Sodium 10 MG Oral Tablet; TAKE 1 TABLET BY MOUTH ONCE DAILY; Therapy: 21Jun2017 to (Gunner Petty)  Requested for: 21Jun2017 Ordered   5  PriLOSEC OTC 20 MG Oral Tablet Delayed Release; TAKE 1 TABLET DAILY; Therapy: (Recorded:12Biw2622) to Recorded    The medication list was reviewed and updated today  Allergies  1  No Known Drug Allergies    Vitals   Recorded: [id number] 11:31AM   Temperature 98 F   Systolic 857, LUE, Sitting   Diastolic 68, LUE, Sitting   Height 5 ft 6 in   Weight 221 lb 8 0 oz   BMI Calculated 35 75   BSA Calculated 2 09     Physical Exam    Constitutional   General appearance: No acute distress, well appearing and well nourished      Ears, Nose, Mouth, and Throat   External inspection of ears and nose: Normal     Otoscopic examination: Tympanic membrance translucent with normal light reflex  Canals patent without erythema  Oropharynx: Normal with no erythema, edema, exudate or lesions  Pulmonary   Respiratory effort: No increased work of breathing or signs of respiratory distress  Auscultation of lungs: Clear to auscultation, equal breath sounds bilaterally, no wheezes, no rales, no rhonci  Cardiovascular   Auscultation of heart: Normal rate and rhythm, normal S1 and S2, without murmurs  Abdomen   Abdomen: Non-tender, no masses  Liver and spleen: No hepatomegaly or splenomegaly  Lymphatic   Palpation of lymph nodes in neck: No lymphadenopathy  Skin   Skin and subcutaneous tissue: Normal without rashes or lesions      Psychiatric   Orientation to person, place and time: Normal     Mood and affect: Normal          Signatures   Electronically signed by : Araceli Aldrich, [facility]; Nov 2 2017  1:18PM EST                       (Author)    Electronically signed by : Don Car DO; Nov 2 2017  4:49PM EST                       (Author)

## 2018-01-10 NOTE — RESULT NOTES
Verified Results  HOLTER MONITOR - 50 HOUR [id number] 09:45AM Eduarda Mills Order Number: [id number]    - Patient Instructions: To schedule this appointment, please contact Central Scheduling at [phone]  For Marc Bear, please call [phone]  Test Name Result Flag Reference   HOLTER MONITOR - 48 HOUR (Report)     PT NAME: Giles Blevins   : 1965 AGE: 49 y o  GENDER: male   MRN: [id number]  PROCEDURE: Holter monitor - 48 hour     INDICATIONS: Tachycardia      DESCRIPTION OF FINDINGS:   The patient was monitored for a total of 47 hours and 47 minutes  Predominant rhythm throughout the tracing noted to be normal sinus rhythm with an average heart rate of 68 beats per minute  A minimum heart rate of 32 beats per minute was noted at 7:55    AM with a maximum heart rate of 130 beats per minute noted at 11:41 AM      No ventricular ectopic activity or sustained ventricular rhythms were noted  Rare supraventricular ectopic activity consisted of 278 single PACs  22 were in couplets  There was one six beat run of atrial tachycardia with a maximum heart rate of 125 beats per minute  There were no significant pauses or high grade AV block  Normal diurnal heart rate variation  1  Predominant rhythm noted throughout the tracing was normal sinus rhythm with an average heart rate of 68 beats per minute  2  Rare supraventricular ectopic activity with one six beat run of atrial tachycardia  3  No symptom-rhythm correlation as the patient did not return a diary with the monitor

## 2018-01-10 NOTE — RESULT NOTES
Verified Results  (1) HEMOGLOBIN A1C 27Jan2016 11:14AM Seabron Cranker Order Number: [id number]      5 7-6 4% impaired fasting glucose  >=6 5% diagnosis of diabetes    Falsely low levels are seen in conditions linked to short RBC life span-  hemolytic anemia, and splenomegaly  Falsely elevated levels are seen in situations where there is an increased production of RBC- receipt of erythropoietin or blood transfusions  Adopted from ADA-Clinical Practice Recommendations     Test Name Result Flag Reference   HEMOGLOBIN A1C 5 9 % H 4 0-5 6   EST  AVG   GLUCOSE 123 mg/dl

## 2018-01-11 NOTE — PROGRESS NOTES
Assessment    1  Never a smoker   2  Panic attack (300 01) (F41 0)    Plan  Panic attack    · Clorazepate Dipotassium 7 5 MG Oral Tablet; Take 1 tablet twice daily    Discussion/Summary    Patient was in the emergency room and I received a call from Dr Ariadne Fierro asking if we could see him on an urgent basis due to having a lot of panic attacks L petitions his cardiac workup on each occasion has been normal with no evidence of elevated them  Cardiac enzymes or EKG changes  He has an impending shin procedure to be done  I think this may be aggravating some of his anxiety were going to put him on Tranxene 7 5 mg twice daily long-term to try to stabilize his anxiety  Also, he needs to have a note for work that he cannot operate a total motor  The patient has the current Goals: Resolution of anxiety  The patent has the current Barriers: Patient's type of work that he performs requires him to occasions, which do not make him sleepy  Patient is able to Self-Care  Chief Complaint  Mr Cely Nobles is here today with the chief complaint of palpitations  He's getting episodes of panic attacks and palpitations and has been in the emergency white a couple of times plus at our office  Some of his anxiety is over and impending ablation, which is to be done at the end of this month  He is not sleeping well and has generalized anxiety disorder  He is using lorazepam when necessary, but no maintenance medications for his anxiety      History of Present Illness  See chief complaint  Regarding episodes of anxiety      Review of Systems    Constitutional: No fever or chills, feels well, no tiredness, no recent weight gain or weight loss  ENT: no complaints of earache, no hearing loss, no nosebleeds, no nasal discharge, no sore throat, no hoarseness  Cardiovascular: palpitations  Respiratory: shortness of breath     Gastrointestinal: No complaints of abdominal pain, no constipation, no nausea or vomiting, no diarrhea or bloody stools  Genitourinary: No complaints of dysuria, no incontinence, no hesitancy, no nocturia, no genital lesion, no testicular pain  Musculoskeletal: No complaints of arthralgia, no myalgias, no joint swelling or stiffness, no limb pain or swelling  Integumentary: No complaints of skin rash or skin lesions, no itching, no skin wound, no dry skin  Neurological: No compliants of headache, no confusion, no convulsions, no numbness or tingling, no dizziness or fainting, no limb weakness, no difficulty walking  Psychiatric: anxiety  Endocrine: No complaints of proptosis, no hot flashes, no muscle weakness, no erectile dysfunction, no deepening of the voice, no feelings of weakness  ROS reviewed  Active Problems    1  Anxiety (300 00) (F41 9)   2  Dyslipidemia (272 4) (E78 5)   3  Encounter for prostate cancer screening (V76 44) (Z12 5)   4  GERD without esophagitis (530 81) (K21 9)   5  Increased BMI (783 9) (R63 8)   6  Lipoma of skin and subcutaneous tissue (214 1) (D17 30)   7  Nocturia (788 43) (R35 1)   8  Panic attack (300 01) (F41 0)   9  Paroxysmal SVT (supraventricular tachycardia) (427 0) (I47 1)   10  Screening for colorectal cancer (V76 51) (Z12 11,Z12 12)   11  Screening for depression (V79 0) (Z13 89)    Past Medical History    1  History of Abdominal bloating (787 3) (R14 0)   2  History of Abdominal pain (789 00) (R10 9)   3  History of Abnormal glucose (790 29) (R73 09)   4  History of Acute sinusitis (461 9) (J01 90)   5  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)   6  History of Encounter for screening colonoscopy (V76 51) (Z12 11)   7  History of Epidermoid cyst of skin (706 2) (L72 0)   8  History of Fatigue (780 79) (R53 83)   9  History of dizziness (V13 89) (Z87 898)   10  History of onychomycosis (V12 09) (Z86 19)   11  History of paroxysmal supraventricular tachycardia (V12 59) (Z86 79)   12  History of viral gastroenteritis (V12 09) (Z86 19)   13   History of Impacted cerumen of both ears (380 4) (H61 23)   14  History of Lipoma of skin and subcutaneous tissue (214 1) (D17 30)   15  History of Need for influenza vaccination (V04 81) (Z23)   16  History of Paronychia of finger, unspecified laterality (681 02) (L03 019)   17  History of Screening for neurological condition (V80 09) (Z13 89)   18  History of Skin lesion (709 9) (L98 9)   19  History of Snoring (786 09) (R06 83)   20  History of Tachycardia (785 0) (R00 0)   21  History of Viral gastroenteritis (008 8) (A08 4)   22  History of Witnessed apneic spells (786 03) (R06 81)    The active problems and past medical history were reviewed and updated today  Surgical History    The surgical history was reviewed and updated today  Family History  Mother    1  Family history of Diabetes (250 00) (E11 9)   2  Family history of kidney disease (V18 69) (Z84 1)   3  Family history of liver cancer (V16 0) (Z80 0)   4  Family history of Heart disease (429 9) (I51 9)  Sister    5  Family history of diabetes mellitus (V18 0) (Z83 3)    The family history was reviewed and updated today  Social History    · Always uses seat belt   · Full-time employment   · Never a smoker   · No alcohol use   · No drug use   · Single  The social history was reviewed and updated today  The social history was reviewed and is unchanged  Current Meds   1  Aspirin 81 MG TABS; Take 1 tablet daily; Therapy: (Recorded:54Jlj6578) to Recorded   2  LORazepam 1 MG Oral Tablet; TAKE 1 TABLET EVERY 6 HOURS AS NEEDED FOR   ANXIETY; Therapy: 70TKV0218 to Recorded   3  PriLOSEC OTC 20 MG Oral Tablet Delayed Release; TAKE 1 TABLET DAILY; Therapy: (Recorded:76Vzv1164) to Recorded    The medication list was reviewed and updated today  Allergies    1   No Known Drug Allergies    Vitals  Vital Signs    Recorded: 48XYC4696 48:76PW   Systolic 316   Diastolic 76   Height 5 ft 6 in   Weight 211 lb 9 6 oz   BMI Calculated 34 15   BSA Calculated 2 05     Physical Exam    Constitutional   General appearance: No acute distress, well appearing and well nourished  Eyes   Conjunctiva and lids: No swelling, erythema, or discharge  Pupils and irises: Equal, round and reactive to light  Ears, Nose, Mouth, and Throat   External inspection of ears and nose: Normal     Otoscopic examination: Tympanic membrance translucent with normal light reflex  Canals patent without erythema  Nasal mucosa, septum, and turbinates: Normal without edema or erythema  Oropharynx: Normal with no erythema, edema, exudate or lesions  Pulmonary   Respiratory effort: No increased work of breathing or signs of respiratory distress  Auscultation of lungs: Clear to auscultation, equal breath sounds bilaterally, no wheezes, no rales, no rhonci  Cardiovascular   Palpation of heart: Normal PMI, no thrills  Auscultation of heart: Normal rate and rhythm, normal S1 and S2, without murmurs  Examination of extremities for edema and/or varicosities: Normal     Carotid pulses: Normal     Abdomen   Abdomen: Non-tender, no masses  Liver and spleen: No hepatomegaly or splenomegaly  Lymphatic   Palpation of lymph nodes in neck: No lymphadenopathy  Musculoskeletal   Gait and station: Normal     Digits and nails: Normal without clubbing or cyanosis  Inspection/palpation of joints, bones, and muscles: Normal     Skin   Skin and subcutaneous tissue: Normal without rashes or lesions  Neurologic   Cranial nerves: Cranial nerves 2-12 intact  Reflexes: 2+ and symmetric  Sensation: No sensory loss  Psychiatric   Orientation to person, place and time: Normal     Mood and affect: Normal         Socks and shoes removed, the Right Foot: the foot was normal, no swelling, no erythema  The sensory exam showed  normal vibratory sensation at the level of the toes on the right  Normal tactile sensation with monofilament testing throughout the right foot  Socks and shoes removed, Left Foot: the foot was normal, no swelling, no erythema  The sensory exam showed  normal vibratory sensation at the level of the toes on the left  Normal tactile sensation with monofilament testing throughout the left foot  Capillary refills findings on the right were normal in the toes  Capillary refills findings on the left were normal in the toes  Assign Risk Category: 0: No loss of protective sensation, no deformity  No present risk        Future Appointments    Date/Time Provider Specialty Site   04/04/2017 08:30 AM Ronna Castro DO Family Medicine [address] [facility]   09/11/2017 08:00 AM Ronna Castro DO Family Medicine [address] [facility]   04/04/2017 02:40 PM Fabrice Bashir DO Cardiology [address]     Signatures   Electronically signed by : Biagio Closs, DO; Mar  8 2017  3:54PM EST                       (Author)

## 2018-01-12 VITALS
DIASTOLIC BLOOD PRESSURE: 76 MMHG | BODY MASS INDEX: 34.01 KG/M2 | SYSTOLIC BLOOD PRESSURE: 102 MMHG | WEIGHT: 211.6 LBS | HEIGHT: 66 IN

## 2018-01-12 VITALS
SYSTOLIC BLOOD PRESSURE: 114 MMHG | BODY MASS INDEX: 35.2 KG/M2 | WEIGHT: 219 LBS | HEIGHT: 66 IN | DIASTOLIC BLOOD PRESSURE: 72 MMHG | HEART RATE: 60 BPM

## 2018-01-13 VITALS
SYSTOLIC BLOOD PRESSURE: 114 MMHG | DIASTOLIC BLOOD PRESSURE: 70 MMHG | BODY MASS INDEX: 34.46 KG/M2 | TEMPERATURE: 97.6 F | WEIGHT: 214.4 LBS | HEIGHT: 66 IN

## 2018-01-13 NOTE — RESULT NOTES
Verified Results  (1) BASIC METABOLIC PROFILE [id number] 09:34AM Anu Willett   TW Order Number: [id number]     Test Name Result Flag Reference   GLUCOSE,RANDM 93 mg/dL     If the patient is fasting, the ADA then defines impaired fasting glucose as > 100 mg/dL and diabetes as > or equal to 123 mg/dL  SODIUM 143 mmol/L  136-145   POTASSIUM 4 0 mmol/L  3 5-5 3   Slightly Hemolyzed; Results May be Affected   CHLORIDE 110 mmol/L H 100-108   CARBON DIOXIDE 25 mmol/L  21-32   ANION GAP (CALC) 8 mmol/L  4-13   BLOOD UREA NITROGEN 11 mg/dL  5-25   CREATININE 0 96 mg/dL  0 60-1 30   Standardized to IDMS reference method   CALCIUM 8 5 mg/dL  8 3-10 1   eGFR Non-African American      >60 0 ml/min/1 73sq m   - Patient Instructions: This bloodwork is non-fasting  Please drink two glasses of water morning of bloodwork  National Kidney Disease Education Program recommendations are as follows:  GFR calculation is accurate only with a steady state creatinine  Chronic Kidney disease less than 60 ml/min/1 73 sq  meters  Kidney failure less than 15 ml/min/1 73 sq  meters  (1) LYME ANTIBODY PROFILE W/REFLEX TO WESTERN BLOT [id number] 09:34AM [facility] Order Number: [id number]     Test Name Result Flag Reference   LYME IGG 0 15  0 00-0 79   NEGATIVE(0 00-0 79)-Absence of detectable Borrelia IgG Antibodies  A negative result does not exclude the possibility of Borrelia infection  If early Lyme disease is suspected,a second sample should be collected & tested 4 weeks after initial testing  LYME IGM 0 46  0 00-0 79   NEGATIVE (0 00-0 79)-Absence of detectable Borrelia IgM antibodies  A negative result does not exclude the possibility of Borrelia infection   If early lyme disease is suspected, a second sample should be collected & tested 4 weeks after initial testing      (1) TSH 27Jan2017 09:34AM Anu Willett   TW Order Number: [id number]     Test Name Result Flag Reference   TSH 2 540 uIU/mL  0 358-3 740   - Patient Instructions: This bloodwork is non-fasting  Please drink two glasses of water morning of bloodwork  - Patient Instructions: This bloodwork is non-fasting  Please drink two glasses of water morning of bloodwork  Patients undergoing fluorescein dye angiography may retain small amounts of fluorescein in the body for 48-72 hours post procedure  Samples containing fluorescein can produce falsely depressed TSH values  If the patient had this procedure,a specimen should be resubmitted post fluorescein clearance

## 2018-01-14 VITALS
HEIGHT: 66 IN | SYSTOLIC BLOOD PRESSURE: 140 MMHG | BODY MASS INDEX: 33.91 KG/M2 | HEART RATE: 64 BPM | WEIGHT: 211 LBS | DIASTOLIC BLOOD PRESSURE: 90 MMHG

## 2018-01-14 VITALS
DIASTOLIC BLOOD PRESSURE: 68 MMHG | HEIGHT: 66 IN | SYSTOLIC BLOOD PRESSURE: 128 MMHG | TEMPERATURE: 98 F | BODY MASS INDEX: 35.6 KG/M2 | WEIGHT: 221.5 LBS

## 2018-01-14 VITALS
SYSTOLIC BLOOD PRESSURE: 110 MMHG | BODY MASS INDEX: 34.72 KG/M2 | WEIGHT: 216 LBS | HEIGHT: 66 IN | DIASTOLIC BLOOD PRESSURE: 64 MMHG

## 2018-01-14 VITALS
SYSTOLIC BLOOD PRESSURE: 108 MMHG | BODY MASS INDEX: 35.08 KG/M2 | HEIGHT: 66 IN | HEART RATE: 46 BPM | DIASTOLIC BLOOD PRESSURE: 66 MMHG | WEIGHT: 218.31 LBS

## 2018-01-14 VITALS
BODY MASS INDEX: 34.42 KG/M2 | HEIGHT: 66 IN | WEIGHT: 214.19 LBS | HEART RATE: 51 BPM | SYSTOLIC BLOOD PRESSURE: 126 MMHG | DIASTOLIC BLOOD PRESSURE: 80 MMHG

## 2018-01-14 VITALS
SYSTOLIC BLOOD PRESSURE: 110 MMHG | HEIGHT: 66 IN | DIASTOLIC BLOOD PRESSURE: 74 MMHG | WEIGHT: 224 LBS | BODY MASS INDEX: 36 KG/M2

## 2018-01-14 VITALS
DIASTOLIC BLOOD PRESSURE: 78 MMHG | WEIGHT: 211 LBS | BODY MASS INDEX: 33.91 KG/M2 | HEIGHT: 66 IN | SYSTOLIC BLOOD PRESSURE: 108 MMHG

## 2018-01-14 NOTE — RESULT NOTES
Verified Results  (1) PSA (SCREEN) (Dx V76 44 Screen for Prostate Cancer) [id number] 11:14AM Dany Vogel Order Number: [id number]     Order Number: [id number]     Test Name Result Flag Reference   PROSTATE SPECIFIC ANTIGEN 0 5 ng/mL  0 0-4 0     (1) URINALYSIS (will reflex a microscopy if leukocytes, occult blood, protein or nitrites are not within normal limits) 27Jan2016 11:14AM Dany Vogel Order Number: [id number]     Test Name Result Flag Reference   COLOR Yellow     CLARITY Cloudy     SPECIFIC GRAVITY UA 1 025  1 003-1 030   PH UA 6 0  4 5-8 0   LEUKOCYTE ESTERASE UA Negative  Negative   NITRITE UA Negative  Negative   PROTEIN UA Negative mg/dl  Negative   GLUCOSE UA Negative mg/dl  Negative   KETONES UA Negative mg/dl  Negative   UROBILINOGEN UA 0 2 E U /dl  0 2, 1 0 E U /dl   BILIRUBIN UA Negative  Negative   BLOOD UA Negative  Negative, Trace-Intact

## 2018-01-15 NOTE — PROGRESS NOTES
Chief Complaint  BP: 142/84, pulse 64  Medications and allergies reviewed  Pt seen this morning by Dr Carolyn Babinski, left office and went shopping, started not to feel well  Came to our office  Complains of chest pain, SOB, dizziness, lightheadedness, tingling in hands, and "calmly, sweaty" feeling  Active Problems    1  Abnormal glucose (790 29) (R73 09)   2  Dizziness (780 4) (R42)   3  Dyslipidemia (272 4) (E78 5)   4  Encounter for screening colonoscopy (V76 51) (Z12 11)   5  Epidermoid cyst of skin (706 2) (L72 0)   6  GERD without esophagitis (530 81) (K21 9)   7  Lipoma of skin and subcutaneous tissue (214 1) (D17 30)   8  Need for influenza vaccination (V04 81) (Z23)   9  Nocturia (788 43) (R35 1)   10  Onychomycosis (110 1) (B35 1)   11  Paronychia of finger, unspecified laterality (681 02) (L03 019)   12  Paroxysmal supraventricular tachycardia (427 0) (I47 1)   13  Paroxysmal SVT (supraventricular tachycardia) (427 0) (I47 1)   14  Screening for colorectal cancer (V76 51) (Z12 11,Z12 12)   15  Screening for depression (V79 0) (Z13 89)   16  Screening for neurological condition (V80 09) (Z13 89)   17  Snoring (786 09) (R06 83)   18  Tachycardia (785 0) (R00 0)   19  Witnessed apneic spells (786 03) (R06 81)    Current Meds   1  Aspirin 81 MG TABS; Take 1 tablet daily; Therapy: (Recorded:88Kyf5923) to Recorded   2  PriLOSEC OTC 20 MG Oral Tablet Delayed Release; TAKE 1 TABLET DAILY; Therapy: (Recorded:29Gco2527) to Recorded    Allergies    1  No Known Drug Allergies    Vitals  Signs    Heart Rate: 64  Systolic: 897, RUE, Sitting  Diastolic: 84, RUE, Sitting  BP Cuff Size: Large  Height: 5 ft 6 in  Weight: 214 lb 3 oz  BMI Calculated: 34 57  BSA Calculated: 2 06    Plan  Paroxysmal supraventricular tachycardia    · EKG/ECG- POC; Status:Complete;   Done: 30YGN7826    Discussion/Summary    ECC with sinus bradycardia   Spoke with Dr Carolyn Babinski - will pursue 2 week ECAT to rule out symptomatic sinus bradycardia prior to SVT ablation        Signatures   Electronically signed by : Michelle Edwards DO; Feb 24 2017  4:46PM EST                       (Author)

## 2018-01-15 NOTE — RESULT NOTES
Verified Results  ECHO COMPLETE WITH CONTRAST IF INDICATED [id number] 09:43AM [facility] Order Number: [id number]    - Patient Instructions: To schedule this appointment, please contact Central Scheduling at [phone]  For Frankey Dodrill, please call [phone]  Test Name Result Flag Reference   ECHO COMPLETE WITH CONTRAST IF INDICATED (Report)      [facility]   [address]   [phone]     Transthoracic Echocardiogram   2D, M-mode, Doppler, and Color Doppler     Study date: 2017     Patient: Johnathon Nieves   MR number: [id number]   Account number: [de-identified]   : 1965   Age: 49 years   Gender: Male   Status: Outpatient   Location: Echo lab   Height: 66 in   Weight: 204 lb   BP: 122/ 70 mmHg     Indications: PALPITATIONS     Diagnoses: R00 0 - Tachycardia, unspecified     Sonographer: Jose Alfredo Juarez [facility], CCT   Primary Physician: Dax Fagan DO   Referring Physician: Dax Fagan DO   Group: [facility] [id number] [facility]   Interpreting Physician: Lauren Escobedo DO     SUMMARY     LEFT VENTRICLE:   Systolic function was normal  Ejection fraction was estimated to be 65 %  There were no regional wall motion abnormalities  Wall thickness was at the upper limits of normal    Left ventricular diastolic function parameters were normal      RIGHT VENTRICLE:   The size was normal    Systolic function was normal      HISTORY: PRIOR HISTORY: TACHYCARDIA/ANXIETY Risk factors: a family history of coronary artery disease  No history of hypertension  PROCEDURE: The procedure was performed in the echo lab  This was a routine study  The transthoracic approach was used  The study included complete 2D imaging, M-mode, complete spectral Doppler, and color Doppler  The heart rate was 80 bpm,   at the start of the study  Image quality was adequate       LEFT VENTRICLE: Size was normal  Systolic function was normal  Ejection fraction was estimated to be 65 %  There were no regional wall motion abnormalities  Wall thickness was at the upper limits of normal  DOPPLER: Left ventricular   diastolic function parameters were normal      RIGHT VENTRICLE: The size was normal  Systolic function was normal  Wall thickness was normal      LEFT ATRIUM: Size was normal      RIGHT ATRIUM: Size was normal      MITRAL VALVE: Valve structure was normal  There was normal leaflet separation  DOPPLER: The transmitral velocity was within the normal range  There was no evidence for stenosis  There was trace regurgitation  AORTIC VALVE: The valve was trileaflet  Leaflets exhibited normal thickness and normal cuspal separation  DOPPLER: Transaortic velocity was within the normal range  There was no evidence for stenosis  There was no regurgitation  TRICUSPID VALVE: The valve structure was normal  There was normal leaflet separation  DOPPLER: The transtricuspid velocity was within the normal range  There was no evidence for stenosis  There was trace regurgitation  The tricuspid jet   envelope definition was inadequate for estimation of RV systolic pressure  There are no indirect findings (abnormal RV volume or geometry, altered pulmonary flow velocity profile, or leftward septal displacement) which would suggest   moderate or severe pulmonary hypertension  PULMONIC VALVE: Leaflets exhibited normal thickness, no calcification, and normal cuspal separation  DOPPLER: The transpulmonic velocity was within the normal range  There was trace regurgitation  PERICARDIUM: There was no pericardial effusion  The pericardium was normal in appearance  AORTA: The root exhibited normal size  SYSTEMIC VEINS: IVC: The inferior vena cava was normal in size and course   Respirophasic changes were normal      SYSTEM MEASUREMENT TABLES     2D   %FS: 30 %   AV Diam: 2 99 cm   EDV(Teich): 91 47 ml   EF(Teich): 57 37 %   ESV(Teich): 38 99 ml   IVSd: 1 01 cm   LA Area: 15 8 cm2   LA Diam: 4 08 cm   LVEDV MOD A4C: 51 05 ml   LVEF MOD A4C: 52 44 %   LVESV MOD A4C: 24 28 ml   LVIDd: 4 48 cm   LVIDs: 3 14 cm   LVLd A4C: 7 05 cm   LVLs A4C: 6 85 cm   LVPWd: 1 07 cm   RA Area: 13 18 cm2   RV DIAM: 2 37 cm   SV MOD A4C: 26 77 ml   SV(Teich): 52 48 ml     CW   AV Vmax: 1 31 m/s   AV maxP 84 mmHg   TR Vmax: 2 2 m/s   TR maxP 42 mmHg     MM   TAPSE: 2 94 cm     PW   E': 0 12 m/s   E/E': 6 65   LVOT Vmax: 1 18 m/s   LVOT maxP 55 mmHg   MV A Zachary: 0 49 m/s   MV Dec Dukes: 3 25 m/s2   MV DecT: 250 21 ms   MV E Zachary: 0 81 m/s   MV E/A Ratio: 1 65     [employer] Accredited Echocardiography Laboratory     Prepared and electronically signed by     Hetal Zuluaga DO   Signed 2017 10:57:08

## 2018-01-17 NOTE — PROGRESS NOTES
Assessment    1  Encounter for preventive health examination (V70 0) (Z00 00)    Discussion/Summary  Impression: health maintenance visit  Currently, he eats a healthy diet and eats an adequate diet  Prostate cancer screening: the risks and benefits of prostate cancer screening were discussed and prostate cancer screening is current  Testicular cancer screening: the risks and benefits of testicular cancer screening were discussed, self testicular exam technique was taught and monthly self testicular exam was advised  Colorectal cancer screening: the risks and benefits of colorectal cancer screening were discussed  The risks and benefits of immunizations were discussed and immunizations are up to date  Advice and education were given regarding nutrition, aerobic exercise, weight bearing exercise, weight loss, calcium supplements, vitamin D supplements, reproductive health, cardiovascular risk reduction, alcohol use, sunscreen use, self skin examination, helmet use, seat belt use, fall risk reduction and advanced directive planning  Patient discussion: discussed with the patient  Patient is urinating well with caffeine avoidance and limiting nightly fluids  His sugar was borderline and he is watching diet with regards to sugars and carbs  History of Present Illness  HM, Adult Male: The patient is being seen for a health maintenance evaluation  General Health: The patient's health since the last visit is described as good  He has regular dental visits  He denies vision problems  He denies hearing loss  Immunizations status: up to date  Lifestyle:  He consumes a diverse and healthy diet  He does not have any weight concerns  He exercises regularly  He does not use tobacco  He denies alcohol use  He denies drug use  Screening: cancer screening reviewed and current  metabolic screening reviewed and current  risk screening reviewed and current        Review of Systems    Constitutional: No fever or chills, feels well, no tiredness, no recent weight gain or weight loss  Eyes: No complaints of eye pain, no red eyes, no discharge from eyes, no itchy eyes  ENT: no complaints of earache, no hearing loss, no nosebleeds, no nasal discharge, no sore throat, no hoarseness  Cardiovascular: No complaints of slow heart rate, no fast heart rate, no chest pain, no palpitations, no leg claudication, no lower extremity  Respiratory: No complaints of shortness of breath, no wheezing, no cough, no SOB on exertion, no orthopnea or PND  Gastrointestinal: No complaints of abdominal pain, no constipation, no nausea or vomiting, no diarrhea or bloody stools  Genitourinary: No complaints of dysuria, no incontinence, no hesitancy, no nocturia, no genital lesion, no testicular pain  Musculoskeletal: No complaints of arthralgia, no myalgias, no joint swelling or stiffness, no limb pain or swelling  Integumentary: No complaints of skin rash or skin lesions, no itching, no skin wound, no dry skin  Neurological: No compliants of headache, no confusion, no convulsions, no numbness or tingling, no dizziness or fainting, no limb weakness, no difficulty walking  Psychiatric: Is not suicidal, no sleep disturbances, no anxiety or depression, no change in personality, no emotional problems  Endocrine: No complaints of proptosis, no hot flashes, no muscle weakness, no erectile dysfunction, no deepening of the voice, no feelings of weakness  Hematologic/Lymphatic: No complaints of swollen glands, no swollen glands in the neck, does not bleed easily, no easy bruising  Active Problems    1  Abnormal glucose (790 29) (R73 09)   2  Acute sinusitis (461 9) (J01 90)   3  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)   4  Dizziness (780 4) (R42)   5  Dyslipidemia (272 4) (E78 5)   6  Fatigue (780 79) (R53 83)   7  GERD (gastroesophageal reflux disease) (530 81) (K21 9)   8  Impacted cerumen of both ears (380 4) (H61 23)   9  Lipoma of skin and subcutaneous tissue (214 1) (D17 30)   10  Nocturia (788 43) (R35 1)   11  Onychomycosis (110 1) (B35 1)   12  Screening for neurological condition (V80 09) (Z13 89)   13  Skin lesion (709 9) (L98 9)   14  Viral gastroenteritis (008 8) (A08 4)    Family History    · Family history of Diabetes (250 00) (E11 9)   · Family history of Heart disease (429 9) (I51 9)    Social History    · Never a smoker   · No alcohol use    Current Meds   1  Esomeprazole Magnesium 40 MG Oral Capsule Delayed Release (NexIUM); TAKE 1   CAPSULE Daily  Requested for: [id number]; Last Rx:[id number] Ordered   2  Multi Vitamin Daily TABS; Therapy: (Recorded:[id number]) to Recorded    Allergies    1  No Known Drug Allergies    Vitals   Recorded: [id number] 08:08AM   Heart Rate 75   Systolic 120, LUE, Sitting   Diastolic 66, LUE, Sitting   Height 5 ft 6 in   Weight 212 lb 8 0 oz   BMI Calculated 34 3   BSA Calculated 2 06     Physical Exam    Constitutional   General appearance: No acute distress, well appearing and well nourished  Eyes   Conjunctiva and lids: No swelling, erythema, or discharge  Pupils and irises: Equal, round and reactive to light  Ears, Nose, Mouth, and Throat   External inspection of ears and nose: Normal     Otoscopic examination: Tympanic membrance translucent with normal light reflex  Canals patent without erythema  Oropharynx: Normal with no erythema, edema, exudate or lesions  Pulmonary   Respiratory effort: No increased work of breathing or signs of respiratory distress  Auscultation of lungs: Clear to auscultation  Cardiovascular   Auscultation of heart: Normal rate and rhythm, normal S1 and S2, without murmurs  Examination of extremities for edema and/or varicosities: Normal     Abdomen   Abdomen: Non-tender, no masses  Lymphatic   Palpation of lymph nodes in neck: No lymphadenopathy      Musculoskeletal   Gait and station: Normal     Digits and nails: Normal without clubbing or cyanosis  Inspection/palpation of joints, bones, and muscles: Normal     Skin   Skin and subcutaneous tissue: Normal without rashes or lesions  Neurologic   Cranial nerves: Cranial nerves 2-12 intact  Psychiatric   Orientation to person, place and time: Normal     Mood and affect: Normal        Procedure    Procedure: Hearing Acuity Test    Audiometry:   The patient Tolerated the procedure well  There were no complications  Procedure: Visual Acuity Test    Results: normal in both eyes  The patient tolerated the procedure well  There were no complications        Signatures   Electronically signed by : Daisy Jay [facility]; Feb 24 2016  8:36AM EST                       (Author)    Electronically signed by : Xena Fischer DO; Feb 24 2016  1:43PM EST                       (Author)

## 2018-01-22 VITALS
DIASTOLIC BLOOD PRESSURE: 84 MMHG | HEIGHT: 66 IN | WEIGHT: 214.19 LBS | BODY MASS INDEX: 34.42 KG/M2 | SYSTOLIC BLOOD PRESSURE: 142 MMHG | HEART RATE: 64 BPM

## 2019-06-22 ENCOUNTER — HOSPITAL ENCOUNTER (EMERGENCY)
Facility: HOSPITAL | Age: 54
Discharge: HOME | End: 2019-06-23
Attending: EMERGENCY MEDICINE | Admitting: EMERGENCY MEDICINE
Payer: MEDICARE

## 2019-06-22 ENCOUNTER — APPOINTMENT (EMERGENCY)
Dept: RADIOLOGY | Facility: HOSPITAL | Age: 54
End: 2019-06-22
Payer: MEDICARE

## 2019-06-22 DIAGNOSIS — R10.9 ABDOMINAL PAIN: Primary | ICD-10-CM

## 2019-06-22 LAB
APTT PPP: 32 SECONDS (ref 26–38)
BASOPHILS # BLD AUTO: 0.1 THOUSANDS/ΜL (ref 0–0.1)
BASOPHILS NFR BLD AUTO: 1 % (ref 0–2)
EOSINOPHIL # BLD AUTO: 0.5 THOUSAND/ΜL (ref 0–0.61)
EOSINOPHIL NFR BLD AUTO: 5 % (ref 0–5)
ERYTHROCYTE [DISTWIDTH] IN BLOOD BY AUTOMATED COUNT: 13.5 % (ref 11.5–14.5)
HCT VFR BLD AUTO: 42.6 % (ref 42–47)
HGB BLD-MCNC: 14.9 G/DL (ref 14–18)
INR PPP: 1.1 (ref 0.9–1.5)
LYMPHOCYTES # BLD AUTO: 3.6 THOUSANDS/ΜL (ref 0.6–4.47)
LYMPHOCYTES NFR BLD AUTO: 38 % (ref 21–51)
MCH RBC QN AUTO: 30.6 PG (ref 26–34)
MCHC RBC AUTO-ENTMCNC: 35 G/DL (ref 31–37)
MCV RBC AUTO: 88 FL (ref 81–99)
MONOCYTES # BLD AUTO: 1.1 THOUSAND/ΜL (ref 0.17–1.22)
MONOCYTES NFR BLD AUTO: 12 % (ref 2–12)
NEUTROPHILS # BLD AUTO: 4.2 THOUSANDS/ΜL (ref 1.4–6.5)
NEUTS SEG NFR BLD AUTO: 45 % (ref 42–75)
PLATELET # BLD AUTO: 215 THOUSANDS/UL (ref 149–390)
PMV BLD AUTO: 8.9 FL (ref 8.6–11.7)
PROTHROMBIN TIME: 12.8 SECONDS (ref 10.2–13)
RBC # BLD AUTO: 4.87 MILLION/UL (ref 4.3–5.9)
WBC # BLD AUTO: 9.4 THOUSAND/UL (ref 4.8–10.8)

## 2019-06-22 PROCEDURE — 85730 THROMBOPLASTIN TIME PARTIAL: CPT | Performed by: EMERGENCY MEDICINE

## 2019-06-22 PROCEDURE — 85610 PROTHROMBIN TIME: CPT | Performed by: EMERGENCY MEDICINE

## 2019-06-22 PROCEDURE — 71046 X-RAY EXAM CHEST 2 VIEWS: CPT

## 2019-06-22 PROCEDURE — 84484 ASSAY OF TROPONIN QUANT: CPT | Performed by: EMERGENCY MEDICINE

## 2019-06-22 PROCEDURE — 85025 COMPLETE CBC W/AUTO DIFF WBC: CPT | Performed by: EMERGENCY MEDICINE

## 2019-06-22 PROCEDURE — 99285 EMERGENCY DEPT VISIT HI MDM: CPT

## 2019-06-22 PROCEDURE — 80053 COMPREHEN METABOLIC PANEL: CPT | Performed by: EMERGENCY MEDICINE

## 2019-06-22 PROCEDURE — 93005 ELECTROCARDIOGRAM TRACING: CPT

## 2019-06-22 PROCEDURE — 36415 COLL VENOUS BLD VENIPUNCTURE: CPT | Performed by: EMERGENCY MEDICINE

## 2019-06-23 ENCOUNTER — APPOINTMENT (EMERGENCY)
Dept: CT IMAGING | Facility: HOSPITAL | Age: 54
End: 2019-06-23
Payer: MEDICARE

## 2019-06-23 VITALS
HEART RATE: 54 BPM | DIASTOLIC BLOOD PRESSURE: 62 MMHG | TEMPERATURE: 97.1 F | OXYGEN SATURATION: 94 % | BODY MASS INDEX: 37.12 KG/M2 | WEIGHT: 230 LBS | SYSTOLIC BLOOD PRESSURE: 125 MMHG | RESPIRATION RATE: 20 BRPM

## 2019-06-23 LAB
ALBUMIN SERPL BCP-MCNC: 4.3 G/DL (ref 3.5–5.7)
ALP SERPL-CCNC: 65 U/L (ref 40–150)
ALT SERPL W P-5'-P-CCNC: 39 U/L (ref 7–52)
AMORPH PHOS CRY URNS QL MICRO: ABNORMAL /HPF
ANION GAP SERPL CALCULATED.3IONS-SCNC: 7 MMOL/L (ref 4–13)
AST SERPL W P-5'-P-CCNC: 20 U/L (ref 13–39)
BACTERIA UR QL AUTO: ABNORMAL /HPF
BILIRUB SERPL-MCNC: 0.5 MG/DL (ref 0.2–1)
BILIRUB UR QL STRIP: NEGATIVE
BUN SERPL-MCNC: 16 MG/DL (ref 7–25)
CALCIUM SERPL-MCNC: 9.6 MG/DL (ref 8.6–10.5)
CHLORIDE SERPL-SCNC: 105 MMOL/L (ref 98–107)
CLARITY UR: ABNORMAL
CO2 SERPL-SCNC: 26 MMOL/L (ref 21–31)
COLOR UR: YELLOW
CREAT SERPL-MCNC: 1.2 MG/DL (ref 0.7–1.3)
GFR SERPL CREATININE-BSD FRML MDRD: 69 ML/MIN/1.73SQ M
GLUCOSE SERPL-MCNC: 110 MG/DL (ref 65–99)
GLUCOSE UR STRIP-MCNC: NEGATIVE MG/DL
HGB UR QL STRIP.AUTO: NEGATIVE
KETONES UR STRIP-MCNC: ABNORMAL MG/DL
LEUKOCYTE ESTERASE UR QL STRIP: NEGATIVE
LIPASE SERPL-CCNC: 34 U/L (ref 11–82)
NITRITE UR QL STRIP: NEGATIVE
NON-SQ EPI CELLS URNS QL MICRO: ABNORMAL /HPF
PH UR STRIP.AUTO: 8 [PH]
POTASSIUM SERPL-SCNC: 3.7 MMOL/L (ref 3.5–5.5)
PROT SERPL-MCNC: 7.5 G/DL (ref 6.4–8.9)
PROT UR STRIP-MCNC: NEGATIVE MG/DL
RBC #/AREA URNS AUTO: ABNORMAL /HPF
SODIUM SERPL-SCNC: 138 MMOL/L (ref 134–143)
SP GR UR STRIP.AUTO: 1.02 (ref 1–1.03)
TROPONIN I SERPL-MCNC: <0.03 NG/ML
UROBILINOGEN UR QL STRIP.AUTO: 0.2 E.U./DL
WBC #/AREA URNS AUTO: ABNORMAL /HPF

## 2019-06-23 PROCEDURE — 83690 ASSAY OF LIPASE: CPT | Performed by: EMERGENCY MEDICINE

## 2019-06-23 PROCEDURE — 96361 HYDRATE IV INFUSION ADD-ON: CPT

## 2019-06-23 PROCEDURE — 96374 THER/PROPH/DIAG INJ IV PUSH: CPT

## 2019-06-23 PROCEDURE — 81001 URINALYSIS AUTO W/SCOPE: CPT | Performed by: EMERGENCY MEDICINE

## 2019-06-23 PROCEDURE — 36415 COLL VENOUS BLD VENIPUNCTURE: CPT | Performed by: EMERGENCY MEDICINE

## 2019-06-23 PROCEDURE — 96375 TX/PRO/DX INJ NEW DRUG ADDON: CPT

## 2019-06-23 PROCEDURE — C9113 INJ PANTOPRAZOLE SODIUM, VIA: HCPCS | Performed by: EMERGENCY MEDICINE

## 2019-06-23 PROCEDURE — 74177 CT ABD & PELVIS W/CONTRAST: CPT

## 2019-06-23 RX ORDER — SODIUM CHLORIDE 9 MG/ML
150 INJECTION, SOLUTION INTRAVENOUS CONTINUOUS
Status: DISCONTINUED | OUTPATIENT
Start: 2019-06-23 | End: 2019-06-23 | Stop reason: HOSPADM

## 2019-06-23 RX ORDER — HYDROMORPHONE HCL/PF 1 MG/ML
1 SYRINGE (ML) INJECTION ONCE
Status: COMPLETED | OUTPATIENT
Start: 2019-06-23 | End: 2019-06-23

## 2019-06-23 RX ORDER — ONDANSETRON 2 MG/ML
4 INJECTION INTRAMUSCULAR; INTRAVENOUS ONCE
Status: COMPLETED | OUTPATIENT
Start: 2019-06-23 | End: 2019-06-23

## 2019-06-23 RX ORDER — PANTOPRAZOLE SODIUM 40 MG/1
40 INJECTION, POWDER, FOR SOLUTION INTRAVENOUS ONCE
Status: COMPLETED | OUTPATIENT
Start: 2019-06-23 | End: 2019-06-23

## 2019-06-23 RX ADMIN — IOHEXOL 50 ML: 240 INJECTION, SOLUTION INTRATHECAL; INTRAVASCULAR; INTRAVENOUS; ORAL at 00:45

## 2019-06-23 RX ADMIN — HYDROMORPHONE HYDROCHLORIDE 1 MG: 1 INJECTION, SOLUTION INTRAMUSCULAR; INTRAVENOUS; SUBCUTANEOUS at 00:23

## 2019-06-23 RX ADMIN — ONDANSETRON 4 MG: 2 INJECTION INTRAMUSCULAR; INTRAVENOUS at 00:22

## 2019-06-23 RX ADMIN — IOHEXOL 100 ML: 350 INJECTION, SOLUTION INTRAVENOUS at 02:13

## 2019-06-23 RX ADMIN — SODIUM CHLORIDE 150 ML/HR: 0.9 INJECTION, SOLUTION INTRAVENOUS at 00:23

## 2019-06-23 RX ADMIN — PANTOPRAZOLE SODIUM 40 MG: 40 INJECTION, POWDER, FOR SOLUTION INTRAVENOUS at 01:09

## 2019-06-24 LAB
ATRIAL RATE: 76 BPM
P AXIS: 44 DEGREES
PR INTERVAL: 172 MS
QRS AXIS: 30 DEGREES
QRSD INTERVAL: 76 MS
QT INTERVAL: 382 MS
QTC INTERVAL: 429 MS
T WAVE AXIS: 39 DEGREES
VENTRICULAR RATE: 76 BPM

## 2019-06-24 PROCEDURE — 93010 ELECTROCARDIOGRAM REPORT: CPT | Performed by: INTERNAL MEDICINE

## 2019-08-09 RX ORDER — MONTELUKAST SODIUM 10 MG/1
1 TABLET ORAL DAILY
COMMUNITY
Start: 2017-06-21 | End: 2019-08-12

## 2019-08-09 RX ORDER — FLUTICASONE PROPIONATE 50 MCG
2 SPRAY, SUSPENSION (ML) NASAL DAILY
COMMUNITY
Start: 2017-06-21 | End: 2019-08-12

## 2019-08-09 RX ORDER — CETIRIZINE HYDROCHLORIDE 10 MG/1
1 TABLET ORAL DAILY
COMMUNITY
Start: 2017-06-21 | End: 2019-08-12

## 2019-08-09 RX ORDER — OMEPRAZOLE 20 MG/1
1 TABLET, DELAYED RELEASE ORAL DAILY
COMMUNITY
End: 2019-08-16 | Stop reason: ALTCHOICE

## 2019-08-12 ENCOUNTER — OFFICE VISIT (OUTPATIENT)
Dept: FAMILY MEDICINE CLINIC | Facility: CLINIC | Age: 54
End: 2019-08-12
Payer: MEDICARE

## 2019-08-12 VITALS
HEART RATE: 66 BPM | WEIGHT: 238.4 LBS | BODY MASS INDEX: 38.31 KG/M2 | DIASTOLIC BLOOD PRESSURE: 94 MMHG | OXYGEN SATURATION: 96 % | HEIGHT: 66 IN | SYSTOLIC BLOOD PRESSURE: 146 MMHG

## 2019-08-12 DIAGNOSIS — R53.83 FATIGUE, UNSPECIFIED TYPE: ICD-10-CM

## 2019-08-12 DIAGNOSIS — K21.9 GERD WITHOUT ESOPHAGITIS: Primary | ICD-10-CM

## 2019-08-12 DIAGNOSIS — R35.1 NOCTURIA: ICD-10-CM

## 2019-08-12 DIAGNOSIS — Z00.00 MEDICARE ANNUAL WELLNESS VISIT, SUBSEQUENT: ICD-10-CM

## 2019-08-12 PROCEDURE — G0439 PPPS, SUBSEQ VISIT: HCPCS | Performed by: PHYSICIAN ASSISTANT

## 2019-08-12 PROCEDURE — 99213 OFFICE O/P EST LOW 20 MIN: CPT | Performed by: PHYSICIAN ASSISTANT

## 2019-08-12 NOTE — PROGRESS NOTES
Assessment/Plan:    Problem List Items Addressed This Visit        Digestive    GERD without esophagitis - Primary    Relevant Medications    omeprazole (PRILOSEC OTC) 20 MG tablet    Other Relevant Orders    Ambulatory referral to Gastroenterology      Other Visit Diagnoses     Nocturia        Relevant Orders    PSA, Total Screen    Fatigue, unspecified type        Relevant Orders    Glucose, random    Medicare annual wellness visit, subsequent        BMI 38 0-38 9,adult               Diagnoses and all orders for this visit:    GERD without esophagitis  -     Ambulatory referral to Gastroenterology; Future    Nocturia  -     PSA, Total Screen; Future    Fatigue, unspecified type  -     Glucose, random; Future    Medicare annual wellness visit, subsequent    BMI 38 0-38 9,adult    Other orders  -     aspirin 81 MG tablet; Take 1 tablet by mouth daily  -     Discontinue: cetirizine (ZyrTEC) 10 mg tablet; Take 1 tablet by mouth daily  -     Discontinue: fluticasone (FLONASE) 50 mcg/act nasal spray; 2 sprays into each nostril daily  -     Discontinue: montelukast (SINGULAIR) 10 mg tablet; Take 1 tablet by mouth daily  -     omeprazole (PRILOSEC OTC) 20 MG tablet; Take 1 tablet by mouth daily              Subjective:      Patient ID: Christiano Cabello is a 47 y o  male  Leana Rivera is here today complaining of frequent urination at night  Has to get up 5-10 times per night x 6 months - 1 year  Denies urinary changes during the day  He is also requesting referral to GI, feels like heartburn is worsening and medication does not work as well anymore  The following portions of the patient's history were reviewed and updated as appropriate:   He has a past medical history of GERD (gastroesophageal reflux disease), High cholesterol, Panic disorder, and SVT (supraventricular tachycardia) ([facility] [address] )  ,  does not have any pertinent problems on file  ,   has a past surgical history that includes Cyst Removal and AV node ablation  ,  family history includes Diabetes in his mother and sister; Heart disease in his mother; Kidney disease in his mother; Liver cancer in his mother  ,   reports that he quit smoking about 5 years ago  He has never used smokeless tobacco  He reports that he drinks alcohol  He reports that he does not use drugs  ,  has No Known Allergies     Current Outpatient Medications   Medication Sig Dispense Refill    aspirin 81 MG tablet Take 1 tablet by mouth daily      omeprazole (PRILOSEC OTC) 20 MG tablet Take 1 tablet by mouth daily       No current facility-administered medications for this visit  Review of Systems   Constitutional: Negative for activity change, appetite change, chills, diaphoresis, fatigue, fever and unexpected weight change  HENT: Negative for congestion, ear pain, postnasal drip, rhinorrhea, sinus pressure, sinus pain, sneezing, sore throat, tinnitus and voice change  Eyes: Negative for pain, redness and visual disturbance  Respiratory: Negative for cough, chest tightness, shortness of breath and wheezing  Cardiovascular: Negative for chest pain, palpitations and leg swelling  Gastrointestinal: Negative for abdominal pain, blood in stool, constipation, diarrhea, nausea and vomiting  Genitourinary: Positive for frequency  Negative for difficulty urinating, dysuria, hematuria and urgency  Musculoskeletal: Negative for arthralgias, back pain, gait problem, joint swelling, myalgias, neck pain and neck stiffness  Skin: Negative for color change, pallor, rash and wound  Neurological: Negative for dizziness, tremors, weakness, light-headedness and headaches  Psychiatric/Behavioral: Negative for dysphoric mood, self-injury, sleep disturbance and suicidal ideas  The patient is not nervous/anxious  Objective:  Vitals:    08/12/19 1437   BP: 146/94   Pulse: 66   SpO2: 96%   Weight: 108 kg (238 lb 6 4 oz)   Height: 5' 6" (1 676 m)     Body mass index is 38 48 kg/m²       Physical Exam Constitutional: He is oriented to person, place, and time  He appears well-developed and well-nourished  No distress  HENT:   Head: Normocephalic and atraumatic  Right Ear: Hearing, tympanic membrane, external ear and ear canal normal    Left Ear: Hearing, tympanic membrane, external ear and ear canal normal    Mouth/Throat: Uvula is midline, oropharynx is clear and moist and mucous membranes are normal  No oropharyngeal exudate  Eyes: Conjunctivae are normal  Right eye exhibits no discharge  Left eye exhibits no discharge  No scleral icterus  Neck: Neck supple  Carotid bruit is not present  No thyromegaly present  Cardiovascular: Normal rate, regular rhythm and normal heart sounds  No murmur heard  Pulmonary/Chest: Effort normal and breath sounds normal  No respiratory distress  He has no wheezes  Abdominal: Soft  Bowel sounds are normal  He exhibits no distension and no mass  There is no tenderness  There is no rebound and no guarding  Musculoskeletal: Normal range of motion  He exhibits no edema or tenderness  Lymphadenopathy:     He has no cervical adenopathy  Neurological: He is alert and oriented to person, place, and time  Skin: Skin is warm and dry  No rash noted  He is not diaphoretic  No erythema  Psychiatric: He has a normal mood and affect  His behavior is normal  Judgment and thought content normal    Vitals reviewed  BMI Counseling: Body mass index is 38 48 kg/m²  Discussed the patient's BMI with him  The BMI is above average  BMI counseling and education was provided to the patient  Nutrition recommendations include reducing portion sizes, decreasing overall calorie intake and 3-5 servings of fruits/vegetables daily

## 2019-08-12 NOTE — PATIENT INSTRUCTIONS
Obesity   AMBULATORY CARE:   Obesity  is when your body mass index (BMI) is greater than 30  Your healthcare provider will use your height and weight to measure your BMI  The risks of obesity include  many health problems, such as injuries or physical disability  You may need tests to check for the following:  · Diabetes     · High blood pressure or high cholesterol     · Heart disease     · Gallbladder or liver disease     · Cancer of the colon, breast, prostate, liver, or kidney     · Sleep apnea     · Arthritis or gout  Seek care immediately if:   · You have a severe headache, confusion, or difficulty speaking  · You have weakness on one side of your body  · You have chest pain, sweating, or shortness of breath  Contact your healthcare provider if:   · You have symptoms of gallbladder or liver disease, such as pain in your upper abdomen  · You have knee or hip pain and discomfort while walking  · You have symptoms of diabetes, such as intense hunger and thirst, and frequent urination  · You have symptoms of sleep apnea, such as snoring or daytime sleepiness  · You have questions or concerns about your condition or care  Treatment for obesity  focuses on helping you lose weight to improve your health  Even a small decrease in BMI can reduce the risk for many health problems  Your healthcare provider will help you set a weight-loss goal   · Lifestyle changes  are the first step in treating obesity  These include making healthy food choices and getting regular physical activity  Your healthcare provider may suggest a weight-loss program that involves coaching, education, and therapy  · Medicine  may help you lose weight when it is used with a healthy diet and physical activity  · Surgery  can help you lose weight if you are very obese and have other health problems  There are several types of weight-loss surgery  Ask your healthcare provider for more information    Be successful losing weight:   · Set small, realistic goals  An example of a small goal is to walk for 20 minutes 5 days a week  Anther goal is to lose 5% of your body weight  · Tell friends, family members, and coworkers about your goals  and ask for their support  Ask a friend to lose weight with you, or join a weight-loss support group  · Identify foods or triggers that may cause you to overeat , and find ways to avoid them  Remove tempting high-calorie foods from your home and workplace  Place a bowl of fresh fruit on your kitchen counter  If stress causes you to eat, then find other ways to cope with stress  · Keep a diary to track what you eat and drink  Also write down how many minutes of physical activity you do each day  Weigh yourself once a week and record it in your diary  Eating changes: You will need to eat 500 to 1,000 fewer calories each day than you currently eat to lose 1 to 2 pounds a week  The following changes will help you cut calories:  · Eat smaller portions  Use small plates, no larger than 9 inches in diameter  Fill your plate half full of fruits and vegetables  Measure your food using measuring cups until you know what a serving size looks like  · Eat 3 meals and 1 or 2 snacks each day  Plan your meals in advance  [employer] and eat at home most of the time  Eat slowly  · Eat fruits and vegetables at every meal   They are low in calories and high in fiber, which makes you feel full  Do not add butter, margarine, or cream sauce to vegetables  Use herbs to season steamed vegetables  · Eat less fat and fewer fried foods  Eat more baked or grilled chicken and fish  These protein sources are lower in calories and fat than red meat  Limit fast food  Dress your salads with olive oil and vinegar instead of bottled dressing  · Limit the amount of sugar you eat  Do not drink sugary beverages  Limit alcohol  Activity changes:  Physical activity is good for your body in many ways   It helps you burn calories and build strong muscles  It decreases stress and depression, and improves your mood  It can also help you sleep better  Talk to your healthcare provider before you begin an exercise program   · Exercise for at least 30 minutes 5 days a week  Start slowly  Set aside time each day for physical activity that you enjoy and that is convenient for you  It is best to do both weight training and an activity that increases your heart rate, such as walking, bicycling, or swimming  · Find ways to be more active  Do yard work and housecleaning  Walk up the stairs instead of using elevators  Spend your leisure time going to events that require walking, such as outdoor festivals or fairs  This extra physical activity can help you lose weight and keep it off  Follow up with your healthcare provider as directed: You may need to meet with a dietitian  Write down your questions so you remember to ask them during your visits  © 2017 [address] [employer] Information is for End User's use only and may not be sold, redistributed or otherwise used for commercial purposes  All illustrations and images included in CareNotes® are the copyrighted property of [employer]  or Sergey Leong  The above information is an  only  It is not intended as medical advice for individual conditions or treatments  Talk to your doctor, nurse or pharmacist before following any medical regimen to see if it is safe and effective for you  Urinary Incontinence   WHAT YOU NEED TO KNOW:   What is urinary incontinence? Urinary incontinence (UI) is when you lose control of your bladder  What causes UI? UI occurs because your bladder cannot store or empty urine properly  The following are the most common types of UI:  · Stress incontinence  is when you leak urine due to increased bladder pressure  This may happen when you cough, sneeze, or exercise       · Urge incontinence  is when you feel the need to urinate right away and leak urine accidentally  · Mixed incontinence  is when you have both stress and urge UI  What are the signs and symptoms of UI?   · You feel like your bladder does not empty completely when you urinate  · You urinate often and need to urinate immediately  · You leak urine when you sleep, or you wake up with the urge to urinate  · You leak urine when you cough, sneeze, exercise, or laugh  How is UI diagnosed? Your healthcare provider will ask how often you leak urine and whether you have stress or urge symptoms  Tell him which medicines you take, how often you urinate, and how much liquid you drink each day  You may need any of the following tests:  · Urine tests  may show infection or kidney function  · A pelvic exam  may be done to check for blockages  A pelvic exam will also show if your bladder, uterus, or other organs have moved out of place  · An x-ray, ultrasound, or CT  may show problems with parts of your urinary system  You may be given contrast liquid to help your organs show up better in the pictures  Tell the healthcare provider if you have ever had an allergic reaction to contrast liquid  Do not enter the MRI room with anything metal  Metal can cause serious injury  Tell the healthcare provider if you have any metal in or on your body  · A bladder scan  will show how much urine is left in your bladder after you urinate  You will be asked to urinate and then healthcare providers will use a small ultrasound machine to check the urine left in your bladder  · Cystometry  is used to check the function of your urinary system  Your healthcare provider checks the pressure in your bladder while filling it with fluid  Your bladder pressure may also be tested when your bladder is full and while you urinate  How is UI treated? · Medicines  can help strengthen your bladder control      · Electrical stimulation  is used to send a small amount of electrical energy to your pelvic floor muscles  This helps control your bladder function  Electrodes may be placed outside your body or in your rectum  For women, the electrodes may be placed in the vagina  · A bulking agent  may be injected into the wall of your urethra to make it thicker  This helps keep your urethra closed and decreases urine leakage  · Devices  such as a clamp, pessary, or tampon may help stop urine leaks  Ask your healthcare provider for more information about these and other devices  · Surgery  may be needed if other treatments do not work  Several types of surgery can help improve your bladder control  Ask your healthcare provider for more information about the surgery you may need  How can I manage my symptoms? · Do pelvic muscle exercises often  Your pelvic muscles help you stop urinating  Squeeze these muscles tight for 5 seconds, then relax for 5 seconds  Gradually work up to squeezing for 10 seconds  Do 3 sets of 15 repetitions a day, or as directed  This will help strengthen your pelvic muscles and improve bladder control  · A catheter  may be used to help empty your bladder  A catheter is a tiny, plastic tube that is put into your bladder to drain your urine  Your healthcare provider may tell you to use a catheter to prevent your bladder from getting too full and leaking urine  · Keep a UI record  Write down how often you leak urine and how much you leak  Make a note of what you were doing when you leaked urine  · Train your bladder  Go to the bathroom at set times, such as every 2 hours, even if you do not feel the urge to go  You can also try to hold your urine when you feel the urge to go  For example, hold your urine for 5 minutes when you feel the urge to go  As that becomes easier, hold your urine for 10 minutes  · Drink liquids as directed  Ask your healthcare provider how much liquid to drink each day and which liquids are best for you   You may need to limit the amount of liquid you drink to help control your urine leakage  Limit or do not have drinks that contain caffeine or alcohol  Do not drink any liquid right before you go to bed  · Prevent constipation  Eat a variety of high-fiber foods  Good examples are high-fiber cereals, beans, vegetables, and whole-grain breads  Prune juice may help make your bowel movement softer  Walking is the best way to trigger your intestines to have a bowel movement  · Exercise regularly and maintain a healthy weight  Ask your healthcare provider how much you should weigh and about the best exercise plan for you  Weight loss and exercise will decrease pressure on your bladder and help you control your leakage  Ask him to help you create a weight loss plan if you are overweight  When should I seek immediate care? · You have severe pain  · You are confused or cannot think clearly  When should I contact my healthcare provider? · You have a fever  · You see blood in your urine  · You have pain when you urinate  · You have new or worse pain, even after treatment  · Your mouth feels dry or you have vision changes  · Your urine is cloudy or smells bad  · You have questions or concerns about your condition or care  CARE AGREEMENT:   You have the right to help plan your care  Learn about your health condition and how it may be treated  Discuss treatment options with your caregivers to decide what care you want to receive  You always have the right to refuse treatment  The above information is an  only  It is not intended as medical advice for individual conditions or treatments  Talk to your doctor, nurse or pharmacist before following any medical regimen to see if it is safe and effective for you  © 2017 [address] [employer] [employer] Information is for End User's use only and may not be sold, redistributed or otherwise used for commercial purposes   All illustrations and images included in CareNotes® are the copyrighted property of [employer]  or Sergey Leong  Cigarette Smoking and Your Health   AMBULATORY CARE:   Risks to your health if you smoke:  Nicotine and other chemicals found in tobacco damage every cell in your body  Even if you are a light smoker, you have an increased risk for cancer, heart disease, and lung disease  If you are pregnant or have diabetes, smoking increases your risk for complications  Benefits to your health if you stop smoking:   · You decrease respiratory symptoms such as coughing, wheezing, and shortness of breath  · You reduce your risk for cancers of the lung, mouth, throat, kidney, bladder, pancreas, stomach, and cervix  If you already have cancer, you increase the benefits of chemotherapy  You also reduce your risk for cancer returning or a second cancer from developing  · You reduce your risk for heart disease, blood clots, heart attack, and stroke  · You reduce your risk for lung infections, and diseases such as pneumonia, asthma, chronic bronchitis, and emphysema  · Your circulation improves  More oxygen can be delivered to your body  If you have diabetes, you lower your risk for complications, such as kidney, artery, and eye diseases  You also lower your risk for nerve damage  Nerve damage can lead to amputations, poor vision, and blindness  · You improve your body's ability to heal and to fight infections  Benefits to the health of others if you stop smoking:  Tobacco is harmful to nonsmokers who breathe in your secondhand smoke  The following are ways the health of others around you may improve when you stop smoking:  · You lower the risks for lung cancer and heart disease in nonsmoking adults  · If you are pregnant, you lower the risk for miscarriage, early delivery, low birth weight, and stillbirth  You also lower your baby's risk for SIDS, obesity, developmental delay, and neurobehavioral problems, such as ADHD  · If you have children, you lower their risk for ear infections, colds, pneumonia, bronchitis, and asthma  For more information and support to stop smoking:   · Smokefree  gov  Phone: 9- 168 - 488-9140  Web Address: www smokefrMyNextRun  Follow up with your healthcare provider as directed:  Write down your questions so you remember to ask them during your visits  © 2017 [address] [employer] Information is for End User's use only and may not be sold, redistributed or otherwise used for commercial purposes  All illustrations and images included in CareNotes® are the copyrighted property of [employer]  or Sergey Leong  The above information is an  only  It is not intended as medical advice for individual conditions or treatments  Talk to your doctor, nurse or pharmacist before following any medical regimen to see if it is safe and effective for you  Fall Prevention   WHAT YOU NEED TO KNOW:   What is fall prevention? Fall prevention includes ways to make your home and other areas safer  It also includes ways you can move more carefully to prevent a fall  What increases my risk for falls? · Lack of vitamin D    · Not getting enough sleep each night    · Trouble walking or keeping your balance, or foot problems    · Health conditions that cause changes in your blood pressure, vision, or muscle strength and coordination    · Medicines that make you dizzy, weak, or sleepy    · Problems seeing clearly    · Shoes that have high heels or are not supportive    · Tripping hazards, such as items left on the floor, no handrails on the stairs, or broken steps  How can I help protect myself from falls? · Stand or sit up slowly  This may help you keep your balance and prevent falls  If you need to get up during the night, sit up first  Be sure you are fully awake before you stand  Turn on the light before you start walking  Go slowly in case you are still sleepy   Make sure you will not trip over any pets sleeping in the bedroom  · Use assistive devices as directed  Your healthcare provider may suggest that you use a cane or walker to help you keep your balance  You may need to have grab bars put in your bathroom near the toilet or in the shower  · Wear shoes that fit well and have soles that   Wear shoes both inside and outside  Use slippers with good   Do not wear shoes with high heels  · Wear a personal alarm  This is a device that allows you to call 911 if you fall and need help  Ask your healthcare provider for more information  · Stay active  Exercise can help strengthen your muscles and improve your balance  Your healthcare provider may recommend water aerobics or walking  He or she may also recommend physical therapy to improve your coordination  Never start an exercise program without talking to your healthcare provider first      · Manage medical conditions  Keep all appointments with your healthcare providers  Visit your eye doctor as directed  How can I make my home safer? · Add items to prevent falls in the bathroom  Put nonslip strips on your bath or shower floor to prevent you from slipping  Use a bath mat if you do not have carpet in the bathroom  This will prevent you from falling when you step out of the bath or shower  Use a shower seat so you do not need to stand while you shower  Sit on the toilet or a chair in your bathroom to dry yourself and put on clothing  This will prevent you from losing your balance from drying or dressing yourself while you are standing  · Keep paths clear  Remove books, shoes, and other objects from walkways and stairs  Place cords for telephones and lamps out of the way so that you do not need to walk over them  Tape them down if you cannot move them  Remove small rugs  If you cannot remove a rug, secure it with double-sided tape  This will prevent you from tripping  · Install bright lights in your home  Use night lights to help light paths to the bathroom or kitchen  Always turn on the light before you start walking  · Keep items you use often on shelves within reach  Do not use a step stool to help you reach an item  · Paint or place reflective tape on the edges of your stairs  This will help you see the stairs better  Call 911 or have someone else call if:   · You have fallen and are unconscious  · You have fallen and cannot move part of your body  Contact your healthcare provider if:   · You have fallen and have pain or a headache  · You have questions or concerns about your condition or care  CARE AGREEMENT:   You have the right to help plan your care  Learn about your health condition and how it may be treated  Discuss treatment options with your caregivers to decide what care you want to receive  You always have the right to refuse treatment  The above information is an  only  It is not intended as medical advice for individual conditions or treatments  Talk to your doctor, nurse or pharmacist before following any medical regimen to see if it is safe and effective for you  © 2017 [address] [facility] Information is for End User's use only and may not be sold, redistributed or otherwise used for commercial purposes  All illustrations and images included in CareNotes® are the copyrighted property of [employer]  or Sergey Leong  Advance Directives   WHAT YOU NEED TO KNOW:   What are advance directives? Advance directives are legal documents that state your wishes and plans for medical care  These plans are made ahead of time in case you lose your ability to make decisions for yourself  Advance directives can apply to any medical decision, such as the treatments you want, and if you want to donate organs  What are the types of advance directives? There are many types of advance directives, and each state has rules about how to use them   You may choose a combination of any of the following:  · Living will: This is a written record of the treatment you want  You can also choose which treatments you do not want, which to limit, and which to stop at a certain time  This includes surgery, medicine, IV fluid, and tube feedings  · Durable power of  for healthcare [facility]): This is a written record that states who you want to make healthcare choices for you when you are unable to make them for yourself  This person, called a proxy, is usually a family member or a friend  You may choose more than 1 proxy  · Do not resuscitate (DNR) order:  A DNR order is used in case your heart stops beating or you stop breathing  It is a request not to have certain forms of treatment, such as CPR  A DNR order may be included in other types of advance directives  · Medical directive: This covers the care that you want if you are in a coma, near death, or unable to make decisions for yourself  You can list the treatments you want for each condition  Treatment may include pain medicine, surgery, blood transfusions, dialysis, IV or tube feedings, and a ventilator (breathing machine)  · Values history: This document has questions about your views, beliefs, and how you feel and think about life  This information can help others choose the care that you would choose  Why are advance directives important? An advance directive helps you control your care  Although spoken wishes may be used, it is better to have your wishes written down  Spoken wishes can be misunderstood, or not followed  Treatments may be given even if you do not want them  An advance directive may make it easier for your family to make difficult choices about your care  How do I decide what to put in my advance directives? · Make informed decisions:  Make sure you fully understand treatments or care you may receive   Think about the benefits and problems your decisions could cause for you or your family  Talk to healthcare providers if you have concerns or questions before you write down your wishes  You may also want to talk with your [employer]ism or , or a   Check your state laws to make sure that what you put in your advance directive is legal      · Sign all forms:  Sign and date your advance directive when you have finished  You may also need 2 witnesses to sign the forms  Witnesses cannot be your doctor or his staff, your spouse, heirs or beneficiaries, people you owe money to, or your chosen proxy  Talk to your family, proxy, and healthcare providers about your advance directive  Give each person a copy, and keep one for yourself in a place you can get to easily  Do not keep it hidden or locked away  · Review and revise your plans: You can revise your advance directive at any time, as long as you are able to make decisions  Review your plan every year, and when there are changes in your life, or your health  When you make changes, let your family, proxy, and healthcare providers know  Give each a new copy  Where can I find more information? · [employer]  [address] , [address]   Phone: 8- [phone] - [phone]  Phone: 3- 550 - [phone]  Web Address: http://www  aafp org  · [address] [facility])  [address] [facility], [address]  Phone: 6- [phone]  Phone: [phone]  Web Address: Rahel hickey  [facility] AGREEMENT:   You have the right to help plan your care  To help with this plan, you must learn about your health condition and treatment options  You must also learn about advance directives and how they are used  Work with your healthcare providers to decide what care will be used to treat you  You always have the right to refuse treatment  The above information is an  only   It is not intended as medical advice for individual conditions or treatments  Talk to your doctor, nurse or pharmacist before following any medical regimen to see if it is safe and effective for you  © 2017 [address] [employer] Information is for End User's use only and may not be sold, redistributed or otherwise used for commercial purposes  All illustrations and images included in CareNotes® are the copyrighted property of [employer]  or Sergey Leong

## 2019-08-12 NOTE — PROGRESS NOTES
Assessment and Plan:     Problem List Items Addressed This Visit        Digestive    GERD without esophagitis - Primary    Relevant Medications    omeprazole (PRILOSEC OTC) 20 MG tablet    Other Relevant Orders    Ambulatory referral to Gastroenterology      Other Visit Diagnoses     Nocturia        Relevant Orders    PSA, Total Screen    Fatigue, unspecified type        Relevant Orders    Glucose, random    Medicare annual wellness visit, subsequent             History of Present Illness:     Patient presents for Medicare Annual Wellness visit    Patient Care Team:  Herman Ward PA-C as PCP - General (Family Medicine)  DO Diamond Villar MD     Problem List:     Patient Active Problem List   Diagnosis    GERD without esophagitis      Past Medical and Surgical History:     Past Medical History:   Diagnosis Date    GERD (gastroesophageal reflux disease)     High cholesterol     Panic disorder     SVT (supraventricular tachycardia) (HCC)      Past Surgical History:   Procedure Laterality Date    AV NODE ABLATION      CYST REMOVAL        Family History:     Family History   Problem Relation Age of Onset    Diabetes Mother     Kidney disease Mother     Liver cancer Mother     Heart disease Mother     Diabetes Sister       Social History:     Social History     Tobacco Use   Smoking Status Former Smoker    Last attempt to quit: 2013    Years since quittin 7   Smokeless Tobacco Never Used     Social History     Substance and Sexual Activity   Alcohol Use Yes    Comment: social     Social History     Substance and Sexual Activity   Drug Use No      Medications and Allergies:     Current Outpatient Medications   Medication Sig Dispense Refill    aspirin 81 MG tablet Take 1 tablet by mouth daily      omeprazole (PRILOSEC OTC) 20 MG tablet Take 1 tablet by mouth daily       No current facility-administered medications for this visit  No Known Allergies   Immunizations:      There is no immunization history for the selected administration types on file for this patient  Medicare Screening Tests and Risk Assessments:     Mylene Hammond is here for his Subsequent Wellness visit  Health Risk Assessment:  Patient rates overall health as good  Patient feels that their physical health rating is Same  Eyesight was rated as Same  Hearing was rated as Same  Patient feels that their emotional and mental health rating is Same  Emotional/Mental Health:    PHQ-9 Depression Screening:    Frequency of the following problems over the past two weeks:      1  Little interest or pleasure in doing things: 0 - not at all      2  Feeling down, depressed, or hopeless: 0 - not at all  PHQ-2 Score: 0          Broken Bones/Falls: Fall Risk Assessment:    In the past year, patient has experienced: No history of falling in past year          Bladder/Bowel:  Patient has leaked urine accidently in the last six months  Patient reports no loss of bowel control  Home Safety:  Patient does not have trouble with stairs inside or outside of their home  Patient currently reports that there are no safety hazards present in home, working smoke alarms, working carbon monoxide detectors  Nutrition:  Current diet: Regular with servings of the following:    Medications:  Patient is currently taking over-the-counter supplements  Patient is able to manage medications  Lifestyle Choices:  Patient reports no tobacco use  Patient has not smoked or used tobacco in the past   Patient reports no alcohol use  Patient drives a vehicle  Patient wears seat belt          Activities of Daily Living:  Can get out of bed by his or her self, able to dress self, able to make own meals, able to do own shopping, able to bathe self, can do own laundry/housekeeping, can manage own money, pay bills and track expenses    Previous Hospitalizations:  No hospitalization or ED visit in past 12 months        Advanced Directives:  Patient has not decided on power of   Patient has not completed advanced directive  Preventative Screening/Counseling:      Cardiovascular:      General: Risks and Benefits Discussed and Screening Current          Diabetes:      General: Risks and Benefits Discussed      Due for labs: Blood Glucose          Colorectal Cancer:      General: Risks and Benefits Discussed      Due for studies: Colonoscopy - Low Risk          Prostate Cancer:      General: Risks and Benefits Discussed      Due for labs: PSA          Osteoporosis:      General: Screening Not Indicated          AAA:      General: Patient Declines          Glaucoma:      General: Patient Declines          HIV:      General: Patient Declines          Hepatitis C:      General: Patient Declines        Advanced Directives:   Patient has no living will for healthcare, does not have durable POA for healthcare, patient does not have an advanced directive

## 2019-08-16 ENCOUNTER — HOSPITAL ENCOUNTER (EMERGENCY)
Facility: HOSPITAL | Age: 54
Discharge: HOME | End: 2019-08-16
Attending: EMERGENCY MEDICINE | Admitting: EMERGENCY MEDICINE
Payer: MEDICARE

## 2019-08-16 ENCOUNTER — APPOINTMENT (EMERGENCY)
Dept: CT IMAGING | Facility: HOSPITAL | Age: 54
End: 2019-08-16
Payer: MEDICARE

## 2019-08-16 ENCOUNTER — APPOINTMENT (EMERGENCY)
Dept: RADIOLOGY | Facility: HOSPITAL | Age: 54
End: 2019-08-16
Payer: MEDICARE

## 2019-08-16 ENCOUNTER — APPOINTMENT (EMERGENCY)
Dept: ULTRASOUND IMAGING | Facility: HOSPITAL | Age: 54
End: 2019-08-16
Payer: MEDICARE

## 2019-08-16 VITALS
HEART RATE: 64 BPM | WEIGHT: 236.11 LBS | TEMPERATURE: 98 F | SYSTOLIC BLOOD PRESSURE: 136 MMHG | OXYGEN SATURATION: 96 % | HEIGHT: 66 IN | RESPIRATION RATE: 16 BRPM | BODY MASS INDEX: 37.95 KG/M2 | DIASTOLIC BLOOD PRESSURE: 79 MMHG

## 2019-08-16 DIAGNOSIS — K80.20 CHOLELITHIASIS: Primary | ICD-10-CM

## 2019-08-16 DIAGNOSIS — R20.2 PARESTHESIAS: ICD-10-CM

## 2019-08-16 DIAGNOSIS — R00.2 PALPITATIONS: ICD-10-CM

## 2019-08-16 LAB
ALBUMIN SERPL BCP-MCNC: 3.6 G/DL (ref 3.5–5)
ALP SERPL-CCNC: 86 U/L (ref 46–116)
ALT SERPL W P-5'-P-CCNC: 65 U/L (ref 12–78)
ANION GAP SERPL CALCULATED.3IONS-SCNC: 12 MMOL/L (ref 4–13)
AST SERPL W P-5'-P-CCNC: 26 U/L (ref 5–45)
BASOPHILS # BLD AUTO: 0.06 THOUSANDS/ΜL (ref 0–0.1)
BASOPHILS NFR BLD AUTO: 1 % (ref 0–1)
BILIRUB SERPL-MCNC: 0.3 MG/DL (ref 0.2–1)
BILIRUB UR QL STRIP: NEGATIVE
BUN SERPL-MCNC: 12 MG/DL (ref 5–25)
CALCIUM SERPL-MCNC: 8.6 MG/DL (ref 8.3–10.1)
CHLORIDE SERPL-SCNC: 103 MMOL/L (ref 100–108)
CLARITY UR: CLEAR
CO2 SERPL-SCNC: 25 MMOL/L (ref 21–32)
COLOR UR: YELLOW
CREAT SERPL-MCNC: 1.07 MG/DL (ref 0.6–1.3)
EOSINOPHIL # BLD AUTO: 0.35 THOUSAND/ΜL (ref 0–0.61)
EOSINOPHIL NFR BLD AUTO: 6 % (ref 0–6)
ERYTHROCYTE [DISTWIDTH] IN BLOOD BY AUTOMATED COUNT: 12.8 % (ref 11.6–15.1)
GFR SERPL CREATININE-BSD FRML MDRD: 79 ML/MIN/1.73SQ M
GLUCOSE SERPL-MCNC: 132 MG/DL (ref 65–140)
GLUCOSE UR STRIP-MCNC: NEGATIVE MG/DL
HCT VFR BLD AUTO: 43 % (ref 36.5–49.3)
HGB BLD-MCNC: 14.6 G/DL (ref 12–17)
HGB UR QL STRIP.AUTO: NEGATIVE
IMM GRANULOCYTES # BLD AUTO: 0.02 THOUSAND/UL (ref 0–0.2)
IMM GRANULOCYTES NFR BLD AUTO: 0 % (ref 0–2)
KETONES UR STRIP-MCNC: NEGATIVE MG/DL
LEUKOCYTE ESTERASE UR QL STRIP: NEGATIVE
LIPASE SERPL-CCNC: 179 U/L (ref 73–393)
LYMPHOCYTES # BLD AUTO: 2.22 THOUSANDS/ΜL (ref 0.6–4.47)
LYMPHOCYTES NFR BLD AUTO: 37 % (ref 14–44)
MAGNESIUM SERPL-MCNC: 2.1 MG/DL (ref 1.6–2.6)
MCH RBC QN AUTO: 30.2 PG (ref 26.8–34.3)
MCHC RBC AUTO-ENTMCNC: 34 G/DL (ref 31.4–37.4)
MCV RBC AUTO: 89 FL (ref 82–98)
MONOCYTES # BLD AUTO: 0.83 THOUSAND/ΜL (ref 0.17–1.22)
MONOCYTES NFR BLD AUTO: 14 % (ref 4–12)
NEUTROPHILS # BLD AUTO: 2.59 THOUSANDS/ΜL (ref 1.85–7.62)
NEUTS SEG NFR BLD AUTO: 42 % (ref 43–75)
NITRITE UR QL STRIP: NEGATIVE
NRBC BLD AUTO-RTO: 0 /100 WBCS
PH UR STRIP.AUTO: 7 [PH]
PLATELET # BLD AUTO: 184 THOUSANDS/UL (ref 149–390)
PMV BLD AUTO: 10.4 FL (ref 8.9–12.7)
POTASSIUM SERPL-SCNC: 3.6 MMOL/L (ref 3.5–5.3)
PROT SERPL-MCNC: 7.5 G/DL (ref 6.4–8.2)
PROT UR STRIP-MCNC: NEGATIVE MG/DL
RBC # BLD AUTO: 4.84 MILLION/UL (ref 3.88–5.62)
SODIUM SERPL-SCNC: 140 MMOL/L (ref 136–145)
SP GR UR STRIP.AUTO: 1.01 (ref 1–1.03)
T4 FREE SERPL-MCNC: 0.83 NG/DL (ref 0.76–1.46)
TROPONIN I SERPL-MCNC: <0.02 NG/ML
TSH SERPL DL<=0.05 MIU/L-ACNC: 3.1 UIU/ML (ref 0.36–3.74)
UROBILINOGEN UR QL STRIP.AUTO: 0.2 E.U./DL
WBC # BLD AUTO: 6.07 THOUSAND/UL (ref 4.31–10.16)

## 2019-08-16 PROCEDURE — 81003 URINALYSIS AUTO W/O SCOPE: CPT | Performed by: EMERGENCY MEDICINE

## 2019-08-16 PROCEDURE — 83735 ASSAY OF MAGNESIUM: CPT | Performed by: EMERGENCY MEDICINE

## 2019-08-16 PROCEDURE — 76705 ECHO EXAM OF ABDOMEN: CPT

## 2019-08-16 PROCEDURE — 85025 COMPLETE CBC W/AUTO DIFF WBC: CPT | Performed by: EMERGENCY MEDICINE

## 2019-08-16 PROCEDURE — 96375 TX/PRO/DX INJ NEW DRUG ADDON: CPT

## 2019-08-16 PROCEDURE — 84443 ASSAY THYROID STIM HORMONE: CPT | Performed by: EMERGENCY MEDICINE

## 2019-08-16 PROCEDURE — 74177 CT ABD & PELVIS W/CONTRAST: CPT

## 2019-08-16 PROCEDURE — 84484 ASSAY OF TROPONIN QUANT: CPT | Performed by: EMERGENCY MEDICINE

## 2019-08-16 PROCEDURE — 36415 COLL VENOUS BLD VENIPUNCTURE: CPT | Performed by: EMERGENCY MEDICINE

## 2019-08-16 PROCEDURE — 93005 ELECTROCARDIOGRAM TRACING: CPT

## 2019-08-16 PROCEDURE — 99285 EMERGENCY DEPT VISIT HI MDM: CPT | Performed by: EMERGENCY MEDICINE

## 2019-08-16 PROCEDURE — 83690 ASSAY OF LIPASE: CPT | Performed by: EMERGENCY MEDICINE

## 2019-08-16 PROCEDURE — 96374 THER/PROPH/DIAG INJ IV PUSH: CPT

## 2019-08-16 PROCEDURE — 84439 ASSAY OF FREE THYROXINE: CPT | Performed by: EMERGENCY MEDICINE

## 2019-08-16 PROCEDURE — 99285 EMERGENCY DEPT VISIT HI MDM: CPT

## 2019-08-16 PROCEDURE — 80053 COMPREHEN METABOLIC PANEL: CPT | Performed by: EMERGENCY MEDICINE

## 2019-08-16 PROCEDURE — 71046 X-RAY EXAM CHEST 2 VIEWS: CPT

## 2019-08-16 RX ORDER — ONDANSETRON 2 MG/ML
4 INJECTION INTRAMUSCULAR; INTRAVENOUS ONCE
Status: COMPLETED | OUTPATIENT
Start: 2019-08-16 | End: 2019-08-16

## 2019-08-16 RX ORDER — LORAZEPAM 2 MG/ML
1 INJECTION INTRAMUSCULAR ONCE
Status: COMPLETED | OUTPATIENT
Start: 2019-08-16 | End: 2019-08-16

## 2019-08-16 RX ORDER — ONDANSETRON 8 MG/1
8 TABLET, ORALLY DISINTEGRATING ORAL EVERY 8 HOURS PRN
Qty: 20 TABLET | Refills: 0 | Status: SHIPPED | OUTPATIENT
Start: 2019-08-16 | End: 2019-09-23 | Stop reason: ALTCHOICE

## 2019-08-16 RX ORDER — LORAZEPAM 1 MG/1
1 TABLET ORAL EVERY 12 HOURS PRN
Qty: 11 TABLET | Refills: 0 | Status: ON HOLD | OUTPATIENT
Start: 2019-08-16 | End: 2019-09-24

## 2019-08-16 RX ADMIN — LORAZEPAM 1 MG: 2 INJECTION INTRAMUSCULAR; INTRAVENOUS at 14:02

## 2019-08-16 RX ADMIN — ONDANSETRON 4 MG: 2 INJECTION INTRAMUSCULAR; INTRAVENOUS at 12:24

## 2019-08-16 RX ADMIN — IOHEXOL 100 ML: 350 INJECTION, SOLUTION INTRAVENOUS at 12:54

## 2019-08-16 NOTE — ED NOTES
Pt c o worsening facial tingling  Dr Syed Wolfe made aware       Yordan Neighbours, RN  08/16/19 4596

## 2019-08-16 NOTE — ED NOTES
Megan from lab made aware of add-on T4     Jun Osorio, [facility]  08/16/19 9419

## 2019-08-16 NOTE — ED PROVIDER NOTES
History  Chief Complaint   Patient presents with    Tingling     patient reports that an hour ago he suddenly became very weak and has tingling in his cheecks bilaterally  This is a 41-year-old male with the noted past medical hx who presents to the emergency department complaining of retrosternal chest discomfort in association with a lightheaded/presyncopal sensation beginning this morning at home (approx 1 hr pta) and persisting through ED arrival   It is associated with a sensation of abdominal distention and mild nausea  Has intermittent epigastric abd pain occurring over past several weeks without clearly identified alleviating/triggering factors: he is not currently complaining of this pain now  He also notes a flushed and "tingling" sensation of the face and both upper/lower extremities that has also been ongoing for several hours  This has also happened intermittently over the past several weeks  No focal weakness in any extremity  No focal paresthesias in extremities; paresthesias are diffuse and generalized  States he has had multiple episodes of a similar nature within the past several weeks to months  Seen in the [address] [place] [address] emergency department in June 2019 for same symptoms; had a broad diagnostic workup that did not reveal any obvious abnormalities  Did not take or use anything for symptoms at home  Does not have any history of underlying pulmonary disease; he has a history of cardiac arrhythmia status post ablation therapy approximately in 2017  No history GI/ surgery  No known coronary artery disease  He is a former smoker with last consistent tobacco use in 2013; occasional ETOH use  No recreational drug use  He notes a generalized increase of fatigue over the past several weeks and decreasing tolerance for normal activities over the past several months      A/p:  Diffuse symptoms with GI/neurologic/cardiac/pulmonary components without obviously reproducible abnormalities on examination  No ECG abnormalities  Check broad diagnostic workup including CBC/CMP/lipase/troponin/magnesium  Check chest x-ray/CT abdomen pelvis with IV contrast   Disposition pending  History provided by:  Patient and medical records      Prior to Admission Medications   Prescriptions Last Dose Informant Patient Reported? Taking?   aspirin 81 MG tablet 2019 at Unknown time  Yes Yes   Sig: Take 1 tablet by mouth daily      Facility-Administered Medications: None       Past Medical History:   Diagnosis Date    GERD (gastroesophageal reflux disease)     High cholesterol     Panic disorder     SVT (supraventricular tachycardia) (HCC)        Past Surgical History:   Procedure Laterality Date    AV NODE ABLATION      CYST REMOVAL         Family History   Problem Relation Age of Onset    Diabetes Mother     Kidney disease Mother     Liver cancer Mother     Heart disease Mother     Diabetes Sister      I have reviewed and agree with the history as documented  Social History     Tobacco Use    Smoking status: Former Smoker     Last attempt to quit: 2013     Years since quittin 7    Smokeless tobacco: Never Used   Substance Use Topics    Alcohol use: Yes     Comment: social    Drug use: No        Review of Systems   Constitutional: Positive for diaphoresis and fatigue  Negative for chills and fever  Respiratory: Positive for shortness of breath  Negative for cough  Cardiovascular: Positive for chest pain and palpitations  Gastrointestinal: Positive for abdominal distention and nausea  Negative for abdominal pain, diarrhea and vomiting  Skin: Negative for color change, pallor, rash and wound  Neurological: Positive for light-headedness  Negative for dizziness, syncope, weakness, numbness and headaches  Hematological: Negative for adenopathy  Does not bruise/bleed easily  All other systems reviewed and are negative        Physical Exam  Physical Exam   Constitutional: He is oriented to person, place, and time  He appears well-developed and well-nourished  He is cooperative  No distress  HENT:   Head: Normocephalic and atraumatic  Right Ear: Hearing and external ear normal    Left Ear: Hearing and external ear normal    Nose: Nose normal    Neck: Trachea normal, normal range of motion and phonation normal  Neck supple  No JVD present  No tracheal tenderness, no spinous process tenderness and no muscular tenderness present  No tracheal deviation present  No thyroid mass and no thyromegaly present  Cardiovascular: Normal rate, regular rhythm, S1 normal, S2 normal, normal heart sounds and intact distal pulses  Exam reveals no gallop and no friction rub  No murmur heard  Pulses:       Radial pulses are 2+ on the right side, and 2+ on the left side  Dorsalis pedis pulses are 2+ on the right side, and 2+ on the left side  Posterior tibial pulses are 2+ on the right side, and 2+ on the left side  Pulmonary/Chest: Effort normal and breath sounds normal  No stridor  No respiratory distress  He has no decreased breath sounds  He has no wheezes  He has no rhonchi  He has no rales  He exhibits no tenderness  Abdominal: Soft  He exhibits distension (mildly distended and tympanitic to percussion)  He exhibits no mass  There is no tenderness  There is no rigidity, no rebound, no guarding and no CVA tenderness  Musculoskeletal: Normal range of motion  He exhibits no edema, tenderness or deformity  Lymphadenopathy:     He has no cervical adenopathy  Neurological: He is alert and oriented to person, place, and time  He has normal strength  No cranial nerve deficit or sensory deficit  He exhibits normal muscle tone  GCS eye subscore is 4  GCS verbal subscore is 5  GCS motor subscore is 6  PERRLA; EOMI  Sensation intact to light touch over face in V1-V3 distribution bilaterally  Facial expressions symmetric  Tongue/uvula midline  Shoulder shrug equal bilaterally  Strength 5/5 in UE/LE bilaterally  Sensation intact to light touch in UE/LE bilaterally  Skin: Skin is warm, dry and intact  No rash noted  No erythema  Psychiatric: His speech is normal and behavior is normal  His mood appears anxious  Nursing note and vitals reviewed        Vital Signs  ED Triage Vitals   Temperature Pulse Respirations Blood Pressure SpO2   08/16/19 1125 08/16/19 1125 08/16/19 1125 08/16/19 1125 08/16/19 1125   98 °F (36 7 °C) 65 17 (!) 172/90 100 %      Temp Source Heart Rate Source Patient Position - Orthostatic VS BP Location FiO2 (%)   08/16/19 1125 08/16/19 1237 08/16/19 1125 08/16/19 1125 --   Temporal Monitor Lying Left arm       Pain Score       08/16/19 1125       No Pain           Vitals:    08/16/19 1430 08/16/19 1500 08/16/19 1530 08/16/19 1614   BP: 127/82 126/81 121/80 136/79   Pulse: 63 (!) 51 (!) 49 64   Patient Position - Orthostatic VS:    Lying         Visual Acuity      ED Medications  Medications   ondansetron (ZOFRAN) injection 4 mg (4 mg Intravenous Given 8/16/19 1224)   iohexol (OMNIPAQUE) 350 MG/ML injection (SINGLE-DOSE) 100 mL (100 mL Intravenous Given 8/16/19 1254)   LORazepam (ATIVAN) 2 mg/mL injection 1 mg (1 mg Intravenous Given 8/16/19 1402)       Diagnostic Studies  Results Reviewed     Procedure Component Value Units Date/Time    T4, free [[id number]]  (Normal) Collected:  08/16/19 1140    Lab Status:  Final result Specimen:  Blood from Arm, Left Updated:  08/16/19 1631     Free T4 0 83 ng/dL     UA w Reflex to Microscopic w Reflex to Culture [[id number]] Collected:  08/16/19 1237    Lab Status:  Final result Specimen:  Urine, Clean Catch Updated:  08/16/19 1250     Color, UA Yellow     Clarity, UA Clear     Specific Gravity, UA 1 010     pH, UA 7 0     Leukocytes, UA Negative     Nitrite, UA Negative     Protein, UA Negative mg/dl      Glucose, UA Negative mg/dl      Ketones, UA Negative mg/dl      Urobilinogen, UA 0 2 E U /dl      Bilirubin, UA Negative Blood, UA Negative    TSH [[id number]]  (Normal) Collected:  08/16/19 1140    Lab Status:  Final result Specimen:  Blood from Arm, Left Updated:  08/16/19 1212     TSH 3RD GENERATON 3 104 uIU/mL     Narrative:       Patients undergoing fluorescein dye angiography may retain small amounts of fluorescein in the body for 48-72 hours post procedure  Samples containing fluorescein can produce falsely depressed TSH values  If the patient had this procedure,a specimen should be resubmitted post fluorescein clearance        Troponin I [[id number]]  (Normal) Collected:  08/16/19 1140    Lab Status:  Final result Specimen:  Blood from Arm, Left Updated:  08/16/19 1207     Troponin I <0 02 ng/mL     CMP [[id number]] Collected:  08/16/19 1140    Lab Status:  Final result Specimen:  Blood from Arm, Left Updated:  08/16/19 1206     Sodium 140 mmol/L      Potassium 3 6 mmol/L      Chloride 103 mmol/L      CO2 25 mmol/L      ANION GAP 12 mmol/L      BUN 12 mg/dL      Creatinine 1 07 mg/dL      Glucose 132 mg/dL      Calcium 8 6 mg/dL      AST 26 U/L      ALT 65 U/L      Alkaline Phosphatase 86 U/L      Total Protein 7 5 g/dL      Albumin 3 6 g/dL      Total Bilirubin 0 30 mg/dL      eGFR 79 ml/min/1 73sq m     Narrative:       [facility] guidelines for Chronic Kidney Disease (CKD):     Stage 1 with normal or high GFR (GFR > 90 mL/min/1 73 square meters)    Stage 2 Mild CKD (GFR = 60-89 mL/min/1 73 square meters)    Stage 3A Moderate CKD (GFR = 45-59 mL/min/1 73 square meters)    Stage 3B Moderate CKD (GFR = 30-44 mL/min/1 73 square meters)    Stage 4 Severe CKD (GFR = 15-29 mL/min/1 73 square meters)    Stage 5 End Stage CKD (GFR <15 mL/min/1 73 square meters)  Note: GFR calculation is accurate only with a steady state creatinine    Lipase [[id number]]  (Normal) Collected:  08/16/19 1140    Lab Status:  Final result Specimen:  Blood from Arm, Left Updated:  08/16/19 1200     Lipase 179 u/L     Magnesium [[id number]]  (Normal) Collected:  08/16/19 1140    Lab Status:  Final result Specimen:  Blood from Arm, Left Updated:  08/16/19 1200     Magnesium 2 1 mg/dL     CBC and differential [[id number]]  (Abnormal) Collected:  08/16/19 1140    Lab Status:  Final result Specimen:  Blood from Arm, Left Updated:  08/16/19 1149     WBC 6 07 Thousand/uL      RBC 4 84 Million/uL      Hemoglobin 14 6 g/dL      Hematocrit 43 0 %      MCV 89 fL      MCH 30 2 pg      MCHC 34 0 g/dL      RDW 12 8 %      MPV 10 4 fL      Platelets 159 Thousands/uL      nRBC 0 /100 WBCs      Neutrophils Relative 42 %      Immat GRANS % 0 %      Lymphocytes Relative 37 %      Monocytes Relative 14 %      Eosinophils Relative 6 %      Basophils Relative 1 %      Neutrophils Absolute 2 59 Thousands/µL      Immature Grans Absolute 0 02 Thousand/uL      Lymphocytes Absolute 2 22 Thousands/µL      Monocytes Absolute 0 83 Thousand/µL      Eosinophils Absolute 0 35 Thousand/µL      Basophils Absolute 0 06 Thousands/µL                  US right upper quadrant   Final Result by Fiona Yap MD (08/16 1450)      1   3 mm stone in the cystic duct  No evidence of cholecystitis   2  Hepatic steatosis      Workstation performed: [id number]         CT Abdomen pelvis with contrast   Final Result by Jessica Barlow MD (08/16 1328)      Cholelithiasis and unchanged 3 mm stone in the cystic duct without evidence of cholecystitis  If there is clinical concern for cholecystitis, a right upper quadrant ultrasound could be obtained  Workstation performed: [id number]         XR chest 2 views   ED Interpretation by Maranda Loza DO (08/16 1244)   Airway is midline  Lungs are clear bilaterally with no evidence of pulmonary vascular congestion/focal infiltrate/pleural effusion//pneumothorax  Cardiac and mediastinal silhouettes are within normal limits   Osseous structures appear normal       Final Result by Fiona Yap MD (08/16 1300)      No acute cardiopulmonary disease  Workstation performed: [id number]                    Procedures  Procedures       ED Course  ED Course as of Aug 16 1710   Fri Aug 16, 2019   1136 1121 ECG NSR 68  Normal axis   qrs 82 qtc 418  No acute ST/T changes  No ectopy  Interpreted by me  No change from 2019 or 7 May 2017       1224 1  WBC wnl   2  Hg/Hct wnl   3  Plt wnl   4  Electrolytes wnl   5  TSH wnl; T4 pending  6  Troponin negative  7  Lipase wnl   8  Transaminases wnl           1235 Repeat EC Sinus bradycardia at 55 bpm  Normal axis  No acute ST/T changes  No ectopy   QRS 82 QTc 417  Interpreted by me  No change from prior ECG       1250 Patient to CT scan now  1252 UA: lower-range SG  Otherwise negative  1332 CT demonstrates cystic duct stone  No transaminases/lipase abnormalities to suggest acute cholecystitis  Regardless, given the vague and somewhat ill-defined symptoms, I will obtain right upper quadrant ultrasound for further delineation  Results were d/w patient and his wife  He reports increasing anxiety regarding his sx over the past several weeks (this is episodice and intense with periods of normal mood between acute episodes) and is concerned that sx may be related to an anxiety disorder  States that he was treated with lorazepam 2 yr prior before his cardiac ablation but not since that time  Offered and he accepted lorazepam for sx control  [phone] completed and awaiting interpretation  [address] noted: cystic duct stone re-demonstrated without any findings suggestive of cholecystitis  Patient does not have clinical findings suggestive of cholecystitis  I will discuss with surgery regarding changes in follow-up recommendations given the identified location of the gallstone  200 D/w Dr Loretta Swanson of general surgery   Patient case discussed: likely needs urgent f/u for cholecystectomy but agrees that he does not have finding suggestive of acute cholecystitis based on hx/exam/labs/imaging studies  Will contact scheduling office and call back to ED shortly  80 D/w Dr Loretta Swanson again: office will call patient on Monday for prompt f/u appt  ED return for transition to continuous pain/fever/vomiting/etc      Discussed all results with patient  There is likely some multifactorial etiology of sx including cholelithiasis/biliary colic, anxiety disorder, and possible undetected cardiac arrhythmia  I do not see any findings that mandate immediate intervention; he will however require extensive f/u for further evaluation (surgery, cardiology, and primary physician)  He should return to the ED in the interval for fever, continuous upper abdominal pain, jaundice, dyspnea, syncope  [place] PDMP queried prior to Rx  Based on review of records, there is no evidence of controlled substance abuse or repeated inappropriate ED visits to obtain controlled substances  It is therefore reasonable to prescribe a short course of anxiolytic medication for symptom control with close f/u to PMD also advised  Precautions given to avoid use while driving and to abstain from alcohol while using  All questions were answered prior to discharge  Patient expressed understanding and agreed to plan         MDM  Number of Diagnoses or Management Options  Cholelithiasis: new and requires workup  Palpitations: new and requires workup  Paresthesias: new and requires workup     Amount and/or Complexity of Data Reviewed  Clinical lab tests: ordered and reviewed  Tests in the radiology section of CPT®: reviewed and ordered  Decide to obtain previous medical records or to obtain history from someone other than the patient: yes  Obtain history from someone other than the patient: yes  Review and summarize past medical records: yes  Discuss the patient with other providers: yes  Independent visualization of images, tracings, or specimens: yes    Risk of Complications, Morbidity, and/or Mortality  Presenting problems: high  Diagnostic procedures: high  Management options: high    Patient Progress  Patient progress: stable      Disposition  Final diagnoses:   Cholelithiasis   Palpitations   Paresthesias     Time reflects when diagnosis was documented in both MDM as applicable and the Disposition within this note     Time User Action Codes Description Comment    8/16/2019  4:04 PM Kenan Cohen [K80 20] Cholelithiasis     8/16/2019  4:04 PM Kenan Martin Add [R00 2] Palpitations     8/16/2019  4:04 PM Kenan Martin Add [R20 2] Paresthesias       ED Disposition     ED Disposition Condition Date/Time Comment    Discharge Stable Fri Aug 16, 2019  4:04 PM Rekha Della discharge to home/self care              Follow-up Information     Follow up With Specialties Details Why Contact Info Additional Information    Madiha Dubose DO Family Medicine Schedule an appointment as soon as possible for a visit in 1 week For further evaluation [address] [facility]  [address]  [facility] [place] [address], DO General Surgery, Wound Care Schedule an appointment as soon as possible for a visit in 5 days For further evaluation [address] [facility] Emergency Department Emergency Medicine Go to  If symptoms worsen: if you develop a fever, continuous upper stomach pain, changes in your skin color, shortness of breath, or if you pass out at any time [place]  [id number] [phone] [place] ED, [address], [address]    Your cardiologist  Schedule an appointment as soon as possible for a visit in 1 week For further evaluation            Discharge Medication List as of 8/16/2019  4:08 PM      START taking these medications    Details   LORazepam (ATIVAN) 1 mg tablet Take 1 tablet (1 mg total) by mouth every 12 (twelve) hours as needed for anxiety (Do not drive or drink alcohol while using ), Starting Fri 8/16/2019, Normal      ondansetron (ZOFRAN-ODT) 8 mg disintegrating tablet Take 1 tablet (8 mg total) by mouth every 8 (eight) hours as needed for nausea or vomiting, Starting Fri 8/16/2019, Normal         CONTINUE these medications which have NOT CHANGED    Details   aspirin 81 MG tablet Take 1 tablet by mouth daily, Historical Med           No discharge procedures on file      ED Provider  Electronically Signed by           Joslyn Leary DO  08/17/19 5065

## 2019-08-19 ENCOUNTER — OFFICE VISIT (OUTPATIENT)
Dept: FAMILY MEDICINE CLINIC | Facility: CLINIC | Age: 54
End: 2019-08-19
Payer: MEDICARE

## 2019-08-19 VITALS
WEIGHT: 236.4 LBS | BODY MASS INDEX: 37.99 KG/M2 | SYSTOLIC BLOOD PRESSURE: 122 MMHG | HEIGHT: 66 IN | DIASTOLIC BLOOD PRESSURE: 76 MMHG | OXYGEN SATURATION: 96 % | HEART RATE: 54 BPM

## 2019-08-19 DIAGNOSIS — K80.80 BILIARY CALCULUS OF OTHER SITE WITHOUT OBSTRUCTION: Primary | ICD-10-CM

## 2019-08-19 DIAGNOSIS — Z86.79 HISTORY OF SUPRAVENTRICULAR TACHYCARDIA: ICD-10-CM

## 2019-08-19 PROCEDURE — 99213 OFFICE O/P EST LOW 20 MIN: CPT | Performed by: PHYSICIAN ASSISTANT

## 2019-08-19 NOTE — PROGRESS NOTES
Assessment/Plan:    Problem List Items Addressed This Visit        Other    History of supraventricular tachycardia    Relevant Orders    Ambulatory referral to Cardiology      Other Visit Diagnoses     Biliary calculus of other site without obstruction    -  Primary           Diagnoses and all orders for this visit:    Biliary calculus of other site without obstruction    History of supraventricular tachycardia  -     Ambulatory referral to Cardiology; Future        Claudette Rangel is also requesting to return to cardiology due to his history of SVT  He was lost to follow up due to lack of insurance  Subjective:      Patient ID: Ashleigh Red is a 47 y o  male  Claudette Rangel is here today to follow up from ER visit    Was diagnosed with gall stone in cystic duct, and is scheduled to see Dr Tato Fonseca tomorrow to discuss surgical options  While in ER, Dr Sreekanth Melgar was consulted who arranged for the gen surgery appointment tomorrow 8/20/19  Claudette Rangel does not feel any worse since going home from ER  He knows to return to ER immediately if any symptoms worsen  His biggest complaint is feeling bloated  The following portions of the patient's history were reviewed and updated as appropriate:   He has a past medical history of GERD (gastroesophageal reflux disease), High cholesterol, Panic disorder, and SVT (supraventricular tachycardia) ([facility] [address] )  ,  does not have any pertinent problems on file  ,   has a past surgical history that includes Cyst Removal and AV node ablation  ,  family history includes Diabetes in his mother and sister; Heart disease in his mother; Kidney disease in his mother; Liver cancer in his mother  ,   reports that he quit smoking about 5 years ago  He has never used smokeless tobacco  He reports that he drinks alcohol  He reports that he does not use drugs  ,  has No Known Allergies     Current Outpatient Medications   Medication Sig Dispense Refill    aspirin 81 MG tablet Take 1 tablet by mouth daily      LORazepam (ATIVAN) 1 mg tablet Take 1 tablet (1 mg total) by mouth every 12 (twelve) hours as needed for anxiety (Do not drive or drink alcohol while using ) 11 tablet 0    ondansetron (ZOFRAN-ODT) 8 mg disintegrating tablet Take 1 tablet (8 mg total) by mouth every 8 (eight) hours as needed for nausea or vomiting 20 tablet 0     No current facility-administered medications for this visit  Review of Systems   Constitutional: Negative for activity change, appetite change, chills, diaphoresis, fatigue, fever and unexpected weight change  HENT: Negative for congestion, ear pain, postnasal drip, rhinorrhea, sinus pressure, sinus pain, sneezing, sore throat, tinnitus and voice change  Eyes: Negative for pain, redness and visual disturbance  Respiratory: Negative for cough, chest tightness, shortness of breath and wheezing  Cardiovascular: Negative for chest pain, palpitations and leg swelling  Gastrointestinal: Positive for abdominal distention and abdominal pain  Negative for blood in stool, constipation, diarrhea, nausea and vomiting  Genitourinary: Negative for difficulty urinating, dysuria, frequency, hematuria and urgency  Musculoskeletal: Negative for arthralgias, back pain, gait problem, joint swelling, myalgias, neck pain and neck stiffness  Skin: Negative for color change, pallor, rash and wound  Neurological: Negative for dizziness, tremors, weakness, light-headedness and headaches  Psychiatric/Behavioral: Negative for dysphoric mood, self-injury, sleep disturbance and suicidal ideas  The patient is not nervous/anxious  Objective:  Vitals:    08/19/19 1351   BP: 122/76   Pulse: (!) 54   SpO2: 96%   Weight: 107 kg (236 lb 6 4 oz)   Height: 5' 6" (1 676 m)     Body mass index is 38 16 kg/m²  Physical Exam   Constitutional: He is oriented to person, place, and time  He appears well-developed and well-nourished  No distress  HENT:   Head: Normocephalic and atraumatic     Right Ear: Hearing, tympanic membrane, external ear and ear canal normal    Left Ear: Hearing, tympanic membrane, external ear and ear canal normal    Mouth/Throat: Uvula is midline, oropharynx is clear and moist and mucous membranes are normal  No oropharyngeal exudate  Eyes: Conjunctivae are normal  Right eye exhibits no discharge  Left eye exhibits no discharge  No scleral icterus  Neck: Neck supple  Carotid bruit is not present  No thyromegaly present  Cardiovascular: Normal rate, regular rhythm and normal heart sounds  No murmur heard  Pulmonary/Chest: Effort normal and breath sounds normal  No respiratory distress  He has no wheezes  Abdominal: Soft  Bowel sounds are normal  He exhibits no distension and no mass  There is tenderness  There is no rebound and no guarding  Musculoskeletal: Normal range of motion  He exhibits no edema or tenderness  Lymphadenopathy:     He has no cervical adenopathy  Neurological: He is alert and oriented to person, place, and time  Skin: Skin is warm and dry  No rash noted  He is not diaphoretic  No erythema  Psychiatric: He has a normal mood and affect  His behavior is normal  Judgment and thought content normal    Vitals reviewed

## 2019-08-20 ENCOUNTER — OFFICE VISIT (OUTPATIENT)
Dept: SURGERY | Facility: HOSPITAL | Age: 54
End: 2019-08-20
Payer: MEDICARE

## 2019-08-20 VITALS
WEIGHT: 236 LBS | SYSTOLIC BLOOD PRESSURE: 157 MMHG | HEART RATE: 55 BPM | BODY MASS INDEX: 37.93 KG/M2 | OXYGEN SATURATION: 98 % | HEIGHT: 66 IN | DIASTOLIC BLOOD PRESSURE: 100 MMHG | TEMPERATURE: 97.9 F

## 2019-08-20 DIAGNOSIS — K80.50 BILIARY COLIC: Primary | ICD-10-CM

## 2019-08-20 LAB
ATRIAL RATE: 55 BPM
ATRIAL RATE: 68 BPM
P AXIS: 50 DEGREES
P AXIS: 70 DEGREES
PR INTERVAL: 170 MS
PR INTERVAL: 172 MS
QRS AXIS: 34 DEGREES
QRS AXIS: 65 DEGREES
QRSD INTERVAL: 82 MS
QRSD INTERVAL: 82 MS
QT INTERVAL: 394 MS
QT INTERVAL: 436 MS
QTC INTERVAL: 417 MS
QTC INTERVAL: 418 MS
T WAVE AXIS: 41 DEGREES
T WAVE AXIS: 74 DEGREES
VENTRICULAR RATE: 55 BPM
VENTRICULAR RATE: 68 BPM

## 2019-08-20 PROCEDURE — 93010 ELECTROCARDIOGRAM REPORT: CPT | Performed by: INTERNAL MEDICINE

## 2019-08-20 PROCEDURE — 99204 OFFICE O/P NEW MOD 45 MIN: CPT | Performed by: SURGERY

## 2019-08-20 RX ORDER — HEPARIN SODIUM 5000 [USP'U]/ML
5000 INJECTION, SOLUTION INTRAVENOUS; SUBCUTANEOUS ONCE
Status: CANCELLED | OUTPATIENT
Start: 2019-09-24 | End: 2019-08-20

## 2019-08-20 RX ORDER — SODIUM CHLORIDE, SODIUM LACTATE, POTASSIUM CHLORIDE, CALCIUM CHLORIDE 600; 310; 30; 20 MG/100ML; MG/100ML; MG/100ML; MG/100ML
125 INJECTION, SOLUTION INTRAVENOUS CONTINUOUS
Status: CANCELLED | OUTPATIENT
Start: 2019-09-24

## 2019-08-20 RX ORDER — CEFAZOLIN SODIUM 2 G/50ML
2000 SOLUTION INTRAVENOUS ONCE
Status: CANCELLED | OUTPATIENT
Start: 2019-09-24 | End: 2019-08-20

## 2019-08-20 RX ORDER — CHLORHEXIDINE GLUCONATE 4 G/100ML
SOLUTION TOPICAL DAILY PRN
Status: CANCELLED | OUTPATIENT
Start: 2019-09-24

## 2019-08-20 NOTE — PROGRESS NOTES
Assessment/Plan:    Biliary colic  Patient with history of a cleaned symptoms of biliary colic  He has imaging showing a stone within the cystic duct  After long discussion patient states he like to have his gallbladder removed to prevent future recurrences  I think this is reasonab  The procedure self including all associated risks and benefits were discussed with the patient great length  Patient verbalized understands risks is willing to proceed, consent was signed  He has recently seen in the ER and had a thorough workup there and therefore not require any additional preoperative workup at this time  Diagnoses and all orders for this visit:    Biliary colic          Subjective:      Patient ID: Hayden Rodarte is a 47 y o  male  50-year-old male with a history of excise a, presents today in consultation to discuss cholelithiasis  Patient recently seen in the ER for what he thought was more min exam he attack  At that time he did complain of some abdominal bloating and discomfort in the epigastric  Patient had a thorough workup and imaging revealed a stone within the cystic duct  After discussing with the patient he does have a history of T and had ablation which cure this problem  However on this particular episode he thought he was having again these palpitations but his workup in the ER down did not show this  I states he does have a history of a few times in late at night after eating having some upper abdominal and back pain  He does not attribute this to any reflux problems  He also complains of bloating  I suspect that given his recent findings of a stone this may be secondary to biliary colic  Currently denies any abdominal pain  No nausea vomiting  No fevers or chills  No changes in bowel bladder habits  No chest pain shortness of breath        The following portions of the patient's history were reviewed and updated as appropriate:   He  has a past medical history of GERD (gastroesophageal reflux disease), High cholesterol, Panic disorder, and SVT (supraventricular tachycardia) ([facility] [address] )  He   Patient Active Problem List    Diagnosis Date Noted    Biliary colic 14/88/1515    History of supraventricular tachycardia 08/19/2019    GERD without esophagitis 12/03/2014     He  has a past surgical history that includes Cyst Removal and AV node ablation  His family history includes Diabetes in his mother and sister; Heart disease in his mother; Kidney disease in his mother; Liver cancer in his mother  He  reports that he quit smoking about 5 years ago  He has never used smokeless tobacco  He reports that he drinks alcohol  He reports that he does not use drugs  Current Outpatient Medications   Medication Sig Dispense Refill    aspirin 81 MG tablet Take 1 tablet by mouth daily      LORazepam (ATIVAN) 1 mg tablet Take 1 tablet (1 mg total) by mouth every 12 (twelve) hours as needed for anxiety (Do not drive or drink alcohol while using ) 11 tablet 0    ondansetron (ZOFRAN-ODT) 8 mg disintegrating tablet Take 1 tablet (8 mg total) by mouth every 8 (eight) hours as needed for nausea or vomiting 20 tablet 0     No current facility-administered medications for this visit  He has No Known Allergies       Review of Systems      A review of systems was completed, all negative except as noted above HPI  Objective:      /100   Pulse 55   Temp 97 9 °F (36 6 °C)   Ht 5' 6" (1 676 m)   Wt 107 kg (236 lb)   SpO2 98%   BMI 38 09 kg/m²          Physical Exam   Constitutional: He is oriented to person, place, and time  He appears well-developed and well-nourished  No distress  HENT:   Head: Normocephalic and atraumatic  Eyes: No scleral icterus  Neck: Normal range of motion  Neck supple  No tracheal deviation present  Cardiovascular: Normal rate, regular rhythm and normal heart sounds  Exam reveals no gallop and no friction rub  No murmur heard    Pulmonary/Chest: Breath sounds normal  No stridor  No respiratory distress  He has no wheezes  He has no rales  He exhibits no tenderness  Abdominal: Soft  He exhibits no distension and no mass  There is no tenderness  There is no rebound and no guarding  No hernia  Musculoskeletal: Normal range of motion  He exhibits no edema, tenderness or deformity  Lymphadenopathy:     He has no cervical adenopathy  Neurological: He is alert and oriented to person, place, and time  No cranial nerve deficit  Skin: Skin is warm  No rash noted  He is not diaphoretic  No erythema  No pallor  Psychiatric: He has a normal mood and affect  His behavior is normal    Vitals reviewed

## 2019-08-20 NOTE — LETTER
August 20, 2019     Velia Mccollum DO  [address] [facility] [address]    Patient: Behzad Cruz   YOB: 1965   Date of Visit: 8/20/2019       Dear Dr Iqra Garcia: Thank you for referring Behzad Cruz to me for evaluation  Below are my notes for this consultation  If you have questions, please do not hesitate to call me  I look forward to following your patient along with you  Sincerely,        Christy Villalba DO        CC: No Recipients  Christy Villalba DO  8/20/2019 11:40 AM  Sign at close encounter  Assessment/Plan:    Biliary colic  Patient with history of a cleaned symptoms of biliary colic  He has imaging showing a stone within the cystic duct  After long discussion patient states he like to have his gallbladder removed to prevent future recurrences  I think this is reasonab  The procedure self including all associated risks and benefits were discussed with the patient great length  Patient verbalized understands risks is willing to proceed, consent was signed  He has recently seen in the ER and had a thorough workup there and therefore not require any additional preoperative workup at this time  Diagnoses and all orders for this visit:    Biliary colic          Subjective:      Patient ID: Behzad Cruz is a 47 y o  male  16-year-old male with a history of excise a, presents today in consultation to discuss cholelithiasis  Patient recently seen in the ER for what he thought was more min exam he attack  At that time he did complain of some abdominal bloating and discomfort in the epigastric  Patient had a thorough workup and imaging revealed a stone within the cystic duct  After discussing with the patient he does have a history of T and had ablation which cure this problem  However on this particular episode he thought he was having again these palpitations but his workup in the ER down did not show this    I states he does have a history of a few times in late at night after eating having some upper abdominal and back pain  He does not attribute this to any reflux problems  He also complains of bloating  I suspect that given his recent findings of a stone this may be secondary to biliary colic  Currently denies any abdominal pain  No nausea vomiting  No fevers or chills  No changes in bowel bladder habits  No chest pain shortness of breath  The following portions of the patient's history were reviewed and updated as appropriate:   He  has a past medical history of GERD (gastroesophageal reflux disease), High cholesterol, Panic disorder, and SVT (supraventricular tachycardia) ([facility] [address] )  He   Patient Active Problem List    Diagnosis Date Noted    Biliary colic 66/87/5518    History of supraventricular tachycardia 08/19/2019    GERD without esophagitis 12/03/2014     He  has a past surgical history that includes Cyst Removal and AV node ablation  His family history includes Diabetes in his mother and sister; Heart disease in his mother; Kidney disease in his mother; Liver cancer in his mother  He  reports that he quit smoking about 5 years ago  He has never used smokeless tobacco  He reports that he drinks alcohol  He reports that he does not use drugs  Current Outpatient Medications   Medication Sig Dispense Refill    aspirin 81 MG tablet Take 1 tablet by mouth daily      LORazepam (ATIVAN) 1 mg tablet Take 1 tablet (1 mg total) by mouth every 12 (twelve) hours as needed for anxiety (Do not drive or drink alcohol while using ) 11 tablet 0    ondansetron (ZOFRAN-ODT) 8 mg disintegrating tablet Take 1 tablet (8 mg total) by mouth every 8 (eight) hours as needed for nausea or vomiting 20 tablet 0     No current facility-administered medications for this visit  He has No Known Allergies       Review of Systems      A review of systems was completed, all negative except as noted above HPI      Objective:      /100   Pulse 55   Temp 97 9 °F (36 6 °C)  5' 6" (1 676 m)   Wt 107 kg (236 lb)   SpO2 98%   BMI 38 09 kg/m²           Physical Exam   Constitutional: He is oriented to person, place, and time  He appears well-developed and well-nourished  No distress  HENT:   Head: Normocephalic and atraumatic  Eyes: No scleral icterus  Neck: Normal range of motion  Neck supple  No tracheal deviation present  Cardiovascular: Normal rate, regular rhythm and normal heart sounds  Exam reveals no gallop and no friction rub  No murmur heard  Pulmonary/Chest: Breath sounds normal  No stridor  No respiratory distress  He has no wheezes  He has no rales  He exhibits no tenderness  Abdominal: Soft  He exhibits no distension and no mass  There is no tenderness  There is no rebound and no guarding  No hernia  Musculoskeletal: Normal range of motion  He exhibits no edema, tenderness or deformity  Lymphadenopathy:     He has no cervical adenopathy  Neurological: He is alert and oriented to person, place, and time  No cranial nerve deficit  Skin: Skin is warm  No rash noted  He is not diaphoretic  No erythema  No pallor  Psychiatric: He has a normal mood and affect  His behavior is normal    Vitals reviewed

## 2019-08-20 NOTE — ASSESSMENT & PLAN NOTE
Patient with history of a cleaned symptoms of biliary colic  He has imaging showing a stone within the cystic duct  After long discussion patient states he like to have his gallbladder removed to prevent future recurrences  I think this is reasonab  The procedure self including all associated risks and benefits were discussed with the patient great length  Patient verbalized understands risks is willing to proceed, consent was signed  He has recently seen in the ER and had a thorough workup there and therefore not require any additional preoperative workup at this time

## 2019-08-20 NOTE — H&P
Assessment/Plan:    Biliary colic  Patient with history of a cleaned symptoms of biliary colic  He has imaging showing a stone within the cystic duct  After long discussion patient states he like to have his gallbladder removed to prevent future recurrences  I think this is reasonab  The procedure self including all associated risks and benefits were discussed with the patient great length  Patient verbalized understands risks is willing to proceed, consent was signed  He has recently seen in the ER and had a thorough workup there and therefore not require any additional preoperative workup at this time  Diagnoses and all orders for this visit:    Biliary colic          Subjective:      Patient ID: Kaden Mckoy is a 47 y o  male  62-year-old male with a history of excise a, presents today in consultation to discuss cholelithiasis  Patient recently seen in the ER for what he thought was more min exam he attack  At that time he did complain of some abdominal bloating and discomfort in the epigastric  Patient had a thorough workup and imaging revealed a stone within the cystic duct  After discussing with the patient he does have a history of T and had ablation which cure this problem  However on this particular episode he thought he was having again these palpitations but his workup in the ER down did not show this  I states he does have a history of a few times in late at night after eating having some upper abdominal and back pain  He does not attribute this to any reflux problems  He also complains of bloating  I suspect that given his recent findings of a stone this may be secondary to biliary colic  Currently denies any abdominal pain  No nausea vomiting  No fevers or chills  No changes in bowel bladder habits  No chest pain shortness of breath        The following portions of the patient's history were reviewed and updated as appropriate:   He  has a past medical history of GERD (gastroesophageal reflux disease), High cholesterol, Panic disorder, and SVT (supraventricular tachycardia) ([facility] [address] )  He   Patient Active Problem List    Diagnosis Date Noted    Biliary colic 70/98/2959    History of supraventricular tachycardia 08/19/2019    GERD without esophagitis 12/03/2014     He  has a past surgical history that includes Cyst Removal and AV node ablation  His family history includes Diabetes in his mother and sister; Heart disease in his mother; Kidney disease in his mother; Liver cancer in his mother  He  reports that he quit smoking about 5 years ago  He has never used smokeless tobacco  He reports that he drinks alcohol  He reports that he does not use drugs  Current Outpatient Medications   Medication Sig Dispense Refill    aspirin 81 MG tablet Take 1 tablet by mouth daily      LORazepam (ATIVAN) 1 mg tablet Take 1 tablet (1 mg total) by mouth every 12 (twelve) hours as needed for anxiety (Do not drive or drink alcohol while using ) 11 tablet 0    ondansetron (ZOFRAN-ODT) 8 mg disintegrating tablet Take 1 tablet (8 mg total) by mouth every 8 (eight) hours as needed for nausea or vomiting 20 tablet 0     No current facility-administered medications for this visit  He has No Known Allergies       Review of Systems      A review of systems was completed, all negative except as noted above HPI  Objective:      /100   Pulse 55   Temp 97 9 °F (36 6 °C)   Ht 5' 6" (1 676 m)   Wt 107 kg (236 lb)   SpO2 98%   BMI 38 09 kg/m²           Physical Exam   Constitutional: He is oriented to person, place, and time  He appears well-developed and well-nourished  No distress  HENT:   Head: Normocephalic and atraumatic  Eyes: No scleral icterus  Neck: Normal range of motion  Neck supple  No tracheal deviation present  Cardiovascular: Normal rate, regular rhythm and normal heart sounds  Exam reveals no gallop and no friction rub  No murmur heard    Pulmonary/Chest: Breath sounds normal  No stridor  No respiratory distress  He has no wheezes  He has no rales  He exhibits no tenderness  Abdominal: Soft  He exhibits no distension and no mass  There is no tenderness  There is no rebound and no guarding  No hernia  Musculoskeletal: Normal range of motion  He exhibits no edema, tenderness or deformity  Lymphadenopathy:     He has no cervical adenopathy  Neurological: He is alert and oriented to person, place, and time  No cranial nerve deficit  Skin: Skin is warm  No rash noted  He is not diaphoretic  No erythema  No pallor  Psychiatric: He has a normal mood and affect  His behavior is normal    Vitals reviewed

## 2019-09-19 ENCOUNTER — CONSULT (OUTPATIENT)
Dept: CARDIOLOGY CLINIC | Facility: CLINIC | Age: 54
End: 2019-09-19
Payer: MEDICARE

## 2019-09-19 ENCOUNTER — DOCUMENTATION (OUTPATIENT)
Dept: CARDIOLOGY CLINIC | Facility: CLINIC | Age: 54
End: 2019-09-19

## 2019-09-19 VITALS
WEIGHT: 238.5 LBS | BODY MASS INDEX: 38.33 KG/M2 | DIASTOLIC BLOOD PRESSURE: 84 MMHG | HEIGHT: 66 IN | HEART RATE: 56 BPM | SYSTOLIC BLOOD PRESSURE: 126 MMHG

## 2019-09-19 DIAGNOSIS — Z86.79 STATUS POST RADIOFREQUENCY ABLATION (RFA) OPERATION FOR ARRHYTHMIA: Primary | ICD-10-CM

## 2019-09-19 DIAGNOSIS — Z98.890 STATUS POST RADIOFREQUENCY ABLATION (RFA) OPERATION FOR ARRHYTHMIA: Primary | ICD-10-CM

## 2019-09-19 PROCEDURE — 99214 OFFICE O/P EST MOD 30 MIN: CPT | Performed by: INTERNAL MEDICINE

## 2019-09-19 RX ORDER — OMEPRAZOLE 20 MG/1
TABLET, DELAYED RELEASE ORAL
COMMUNITY
End: 2020-10-30 | Stop reason: SDUPTHER

## 2019-09-19 NOTE — PROGRESS NOTES
Cardiology Follow Up    Néstor Herrera  1965  [id number]  [facility]  [facility]  [address] [id number] [phone]    1  Status post radiofrequency ablation (RFA) operation for arrhythmia  Holter monitor - 24 hour    Echo complete with contrast if indicated    Diagnostic Sleep Study       Interval History:   Cardiology follow-up  Patient also needs clearance for commercial bus driving  He is currently asymptomatic from a cardiac point of view denies any chest pain  No significant palpitations denies any syncope or presyncope  Recent EKG was borderline abnormal read as possible septal MI, personally reviewed  There is poor R-wave progression from V1 V2 but I do not believe this septal infarct  He has no other history of coronary disease, 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, no clinical recurrences, he is known to have sinus bradycardia     He does not have history of diagnosed obstructive sleep apnea, however he is scheduled for that because of witnessed apneas in the past   He does have mild hypersomnolence    Patient Active Problem List   Diagnosis    GERD without esophagitis    History of supraventricular tachycardia    Biliary colic    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     Past Medical History:   Diagnosis Date    GERD (gastroesophageal reflux disease)     High cholesterol     Panic disorder     [facility]      Social History     Socioeconomic History    Marital status: Single     Spouse name: Not on file    Number of children: Not on file    Years of education: Not on file    Highest education level: Not on file   Occupational History    Not on file   Social Needs    Financial resource strain: Not on file    Food insecurity:     Worry: Not on file     Inability: Not on file    Transportation needs:     Medical: Not on file     Non-medical: Not on file   Tobacco Use    Smoking status: Former Smoker     Last attempt to quit: 2013     Years since quittin 8    Smokeless tobacco: Never Used   Substance and Sexual Activity    Alcohol use: Yes     Comment: social    Drug use: No    Sexual activity: Not on file   Lifestyle    Physical activity:     Days per week: Not on file     Minutes per session: Not on file    Stress: Not on file   Relationships    Social connections:     Talks on phone: Not on file     Gets together: Not on file     Attends Denominational service: Not on file     Active member of club or organization: Not on file     Attends meetings of clubs or organizations: Not on file     Relationship status: Not on file    Intimate partner violence:     Fear of current or ex partner: Not on file     Emotionally abused: Not on file     Physically abused: Not on file     Forced sexual activity: Not on file   Other Topics Concern    Not on file   Social History Narrative    Uses seat belt      Family History   Problem Relation Age of Onset    Diabetes Mother     Kidney disease Mother     Liver cancer Mother     Heart disease Mother     Diabetes Sister      Past Surgical History:   Procedure Laterality Date    AV NODE ABLATION      CYST REMOVAL         Current Outpatient Medications:     aspirin 81 MG tablet, Take 1 tablet by mouth daily, Disp: , Rfl:     omeprazole (PRILOSEC OTC) 20 MG tablet, Take by mouth, Disp: , Rfl:     LORazepam (ATIVAN) 1 mg tablet, Take 1 tablet (1 mg total) by mouth every 12 (twelve) hours as needed for anxiety (Do not drive or drink alcohol while using ) (Patient not taking: Reported on 2019), Disp: 11 tablet, Rfl: 0    ondansetron (ZOFRAN-ODT) 8 mg disintegrating tablet, Take 1 tablet (8 mg total) by mouth every 8 (eight) hours as needed for nausea or vomiting (Patient not taking: Reported on 9/19/2019), Disp: 20 tablet, Rfl: 0  No Known Allergies    Labs:  Admission on 08/16/2019, Discharged on 08/16/2019   Component Date Value    WBC 08/16/2019 6 07     RBC 08/16/2019 4 84     Hemoglobin 08/16/2019 14 6     Hematocrit 08/16/2019 43 0     MCV 08/16/2019 89     MCH 08/16/2019 30 2     MCHC 08/16/2019 34 0     RDW 08/16/2019 12 8     MPV 08/16/2019 10 4     Platelets 37/81/9853 184     nRBC 08/16/2019 0     Neutrophils Relative 08/16/2019 42*    Immat GRANS % 08/16/2019 0     Lymphocytes Relative 08/16/2019 37     Monocytes Relative 08/16/2019 14*    Eosinophils Relative 08/16/2019 6     Basophils Relative 08/16/2019 1     Neutrophils Absolute 08/16/2019 2 59     Immature Grans Absolute 08/16/2019 0 02     Lymphocytes Absolute 08/16/2019 2 22     Monocytes Absolute 08/16/2019 0 83     Eosinophils Absolute 08/16/2019 0 35     Basophils Absolute 08/16/2019 0 06     Sodium 08/16/2019 140     Potassium 08/16/2019 3 6     Chloride 08/16/2019 103     CO2 08/16/2019 25     ANION GAP 08/16/2019 12     BUN 08/16/2019 12     Creatinine 08/16/2019 1 07     Glucose 08/16/2019 132     Calcium 08/16/2019 8 6     AST 08/16/2019 26     ALT 08/16/2019 65     Alkaline Phosphatase 08/16/2019 86     Total Protein 08/16/2019 7 5     Albumin 08/16/2019 3 6     Total Bilirubin 08/16/2019 0 30     eGFR 08/16/2019 79     Lipase 08/16/2019 179     Magnesium 08/16/2019 2 1     Color, UA 08/16/2019 Yellow     Clarity, UA 08/16/2019 Clear     Specific Gravity, UA 08/16/2019 1 010     pH, UA 08/16/2019 7 0     Leukocytes, UA 08/16/2019 Negative     Nitrite, UA 08/16/2019 Negative     Protein, UA 08/16/2019 Negative     Glucose, UA 08/16/2019 Negative     Ketones, UA 08/16/2019 Negative     Urobilinogen, UA 08/16/2019 0 2     Bilirubin, UA 08/16/2019 Negative     Blood, UA 08/16/2019 Negative     Troponin I 08/16/2019 <0 02     TSH 3RD GENERATON 08/16/2019 3 104     Free T4 08/16/2019 0 83     Ventricular Rate 08/16/2019 68     Atrial Rate 08/16/2019 68     DC Interval 08/16/2019 170     QRSD Interval 08/16/2019 82     QT Interval 08/16/2019 394     QTC Interval 08/16/2019 418     P Axis 08/16/2019 50     QRS Axis 08/16/2019 34     T Wave Axis 08/16/2019 41     Ventricular Rate 08/16/2019 55     Atrial Rate 08/16/2019 55     DC Interval 08/16/2019 172     QRSD Interval 08/16/2019 82     QT Interval 08/16/2019 436     QTC Interval 08/16/2019 417     P Axis 08/16/2019 70     QRS Axis 08/16/2019 65     T Wave Carbon 08/16/2019 74    Admission on 06/22/2019, Discharged on 06/23/2019   Component Date Value    WBC 06/22/2019 9 40     RBC 06/22/2019 4 87     Hemoglobin 06/22/2019 14 9     Hematocrit 06/22/2019 42 6     MCV 06/22/2019 88     MCH 06/22/2019 30 6     MCHC 06/22/2019 35 0     RDW 06/22/2019 13 5     MPV 06/22/2019 8 9     Platelets 86/18/7146 215     Neutrophils Relative 06/22/2019 45     Lymphocytes Relative 06/22/2019 38     Monocytes Relative 06/22/2019 12     Eosinophils Relative 06/22/2019 5     Basophils Relative 06/22/2019 1     Neutrophils Absolute 06/22/2019 4 20     Lymphocytes Absolute 06/22/2019 3 60     Monocytes Absolute 06/22/2019 1 10     Eosinophils Absolute 06/22/2019 0 50     Basophils Absolute 06/22/2019 0 10     Sodium 06/22/2019 138     Potassium 06/22/2019 3 7     Chloride 06/22/2019 105     CO2 06/22/2019 26     ANION GAP 06/22/2019 7     BUN 06/22/2019 16     Creatinine 06/22/2019 1 20     Glucose 06/22/2019 110*    Calcium 06/22/2019 9 6     AST 06/22/2019 20     ALT 06/22/2019 39     Alkaline Phosphatase 06/22/2019 65     Total Protein 06/22/2019 7 5     Albumin 06/22/2019 4 3     Total Bilirubin 06/22/2019 0 50     eGFR 06/22/2019 69     Troponin I 06/22/2019 <0 03     PTT 06/22/2019 32     Protime 06/22/2019 12 8     INR 06/22/2019 1 10     Lipase 06/23/2019 34     Clarity, UA 06/23/2019 Slightly Cloudy*    Color, UA 06/23/2019 Yellow     Specific Gravity, UA 06/23/2019 1 020     pH, UA 06/23/2019 8 0*    Glucose, UA 06/23/2019 Negative     Ketones, UA 06/23/2019 Trace*    Blood, UA 06/23/2019 Negative     Protein, UA 06/23/2019 Negative     Nitrite, UA 06/23/2019 Negative     Bilirubin, UA 06/23/2019 Negative     Urobilinogen, UA 06/23/2019 0 2     Leukocytes, UA 06/23/2019 Negative     WBC, UA 06/23/2019 None Seen     RBC, UA 06/23/2019 None Seen     Bacteria, UA 06/23/2019 Occasional     AMORPH PHOSPATES 06/23/2019 Innumerable     Epithelial Cells 06/23/2019 Occasional     Ventricular Rate 06/22/2019 76     Atrial Rate 06/22/2019 76     ME Interval 06/22/2019 172     QRSD Interval 06/22/2019 76     QT Interval 06/22/2019 382     QTC Interval 06/22/2019 429     P Axis 06/22/2019 44     QRS Axis 06/22/2019 30     T Wave Axis 06/22/2019 39      Imaging: No results found  Review of Systems:  Review of Systems   Constitutional: Negative for activity change, fatigue and unexpected weight change  Eyes: Negative for visual disturbance  Respiratory: Negative for apnea, shortness of breath, wheezing and stridor  Cardiovascular: Negative for chest pain, palpitations and leg swelling  Neurological: Negative for dizziness and syncope  Hematological: Does not bruise/bleed easily  Psychiatric/Behavioral: Positive for sleep disturbance  Physical Exam:  Physical Exam   Constitutional: He appears well-developed  No distress  Neck: No JVD present  Cardiovascular: Normal rate, regular rhythm, normal heart sounds and intact distal pulses  Exam reveals no gallop and no friction rub  No murmur heard  Pulmonary/Chest: Effort normal and breath sounds normal  No stridor  No respiratory distress  He has no wheezes  He has no rales  Musculoskeletal: He exhibits no edema  Neurological: He is alert  Skin: Skin is warm  Capillary refill takes less than 2 seconds   He is not diaphoretic  Psychiatric: He has a normal mood and affect  Vitals reviewed  Discussion/Summary:  Supraventricular tachycardia, status post radiofrequency ablation, no clinical recurrences  Patient is clear for driving  His EKG is borderline abnormal   But I do not believe the reading  Will do an echocardiogram for completeness  Will also do a 24 hour Holter and at this requesting because of previous suspicion a polysomnogram   Follow-up if any abnormalities  This note was completed in part utilizing GOkey direct voice recognition software  Grammatical errors, random word insertion, spelling mistakes, and incomplete sentences may be an occasional consequence of the system secondary to software limitations, ambient noise and hardware issues  At the time of dictation, efforts were made to edit, clarify and /or correct errors  Please read the chart carefully and recognize, using context, where substitutions have occurred  If you have any questions or concerns about the context, text or information contained within the body of this dictation, please contact myself, the provider, for further clarification

## 2019-09-23 ENCOUNTER — ANESTHESIA EVENT (OUTPATIENT)
Dept: PERIOP | Facility: HOSPITAL | Age: 54
End: 2019-09-23
Payer: MEDICARE

## 2019-09-24 ENCOUNTER — HOSPITAL ENCOUNTER (OUTPATIENT)
Facility: HOSPITAL | Age: 54
Setting detail: OUTPATIENT SURGERY
Discharge: HOME | End: 2019-09-24
Attending: SURGERY | Admitting: SURGERY
Payer: MEDICARE

## 2019-09-24 ENCOUNTER — ANESTHESIA (OUTPATIENT)
Dept: PERIOP | Facility: HOSPITAL | Age: 54
End: 2019-09-24
Payer: MEDICARE

## 2019-09-24 VITALS
OXYGEN SATURATION: 98 % | TEMPERATURE: 98.4 F | SYSTOLIC BLOOD PRESSURE: 132 MMHG | DIASTOLIC BLOOD PRESSURE: 77 MMHG | RESPIRATION RATE: 16 BRPM | HEART RATE: 67 BPM

## 2019-09-24 DIAGNOSIS — K80.50 BILIARY COLIC: Primary | ICD-10-CM

## 2019-09-24 PROCEDURE — 47562 LAPAROSCOPIC CHOLECYSTECTOMY: CPT | Performed by: SURGERY

## 2019-09-24 PROCEDURE — 47562 LAPAROSCOPIC CHOLECYSTECTOMY: CPT | Performed by: PHYSICIAN ASSISTANT

## 2019-09-24 PROCEDURE — NC001 PR NO CHARGE: Performed by: SURGERY

## 2019-09-24 PROCEDURE — 88304 TISSUE EXAM BY PATHOLOGIST: CPT | Performed by: PATHOLOGY

## 2019-09-24 RX ORDER — BUPIVACAINE HYDROCHLORIDE 5 MG/ML
INJECTION, SOLUTION PERINEURAL AS NEEDED
Status: DISCONTINUED | OUTPATIENT
Start: 2019-09-24 | End: 2019-09-24 | Stop reason: HOSPADM

## 2019-09-24 RX ORDER — HYDROMORPHONE HCL/PF 1 MG/ML
0.2 SYRINGE (ML) INJECTION
Status: DISCONTINUED | OUTPATIENT
Start: 2019-09-24 | End: 2019-09-24 | Stop reason: HOSPADM

## 2019-09-24 RX ORDER — KETOROLAC TROMETHAMINE 30 MG/ML
INJECTION, SOLUTION INTRAMUSCULAR; INTRAVENOUS AS NEEDED
Status: DISCONTINUED | OUTPATIENT
Start: 2019-09-24 | End: 2019-09-24 | Stop reason: SURG

## 2019-09-24 RX ORDER — PROPOFOL 10 MG/ML
INJECTION, EMULSION INTRAVENOUS AS NEEDED
Status: DISCONTINUED | OUTPATIENT
Start: 2019-09-24 | End: 2019-09-24 | Stop reason: SURG

## 2019-09-24 RX ORDER — LIDOCAINE HYDROCHLORIDE 10 MG/ML
INJECTION, SOLUTION INFILTRATION; PERINEURAL AS NEEDED
Status: DISCONTINUED | OUTPATIENT
Start: 2019-09-24 | End: 2019-09-24 | Stop reason: SURG

## 2019-09-24 RX ORDER — CEFAZOLIN SODIUM 2 G/50ML
2000 SOLUTION INTRAVENOUS ONCE
Status: COMPLETED | OUTPATIENT
Start: 2019-09-24 | End: 2019-09-24

## 2019-09-24 RX ORDER — OXYCODONE HYDROCHLORIDE AND ACETAMINOPHEN 5; 325 MG/1; MG/1
2 TABLET ORAL EVERY 4 HOURS PRN
Status: DISCONTINUED | OUTPATIENT
Start: 2019-09-24 | End: 2019-09-24 | Stop reason: HOSPADM

## 2019-09-24 RX ORDER — HEPARIN SODIUM 5000 [USP'U]/ML
5000 INJECTION, SOLUTION INTRAVENOUS; SUBCUTANEOUS ONCE
Status: COMPLETED | OUTPATIENT
Start: 2019-09-24 | End: 2019-09-24

## 2019-09-24 RX ORDER — MAGNESIUM HYDROXIDE 1200 MG/15ML
LIQUID ORAL AS NEEDED
Status: DISCONTINUED | OUTPATIENT
Start: 2019-09-24 | End: 2019-09-24 | Stop reason: HOSPADM

## 2019-09-24 RX ORDER — ONDANSETRON 2 MG/ML
INJECTION INTRAMUSCULAR; INTRAVENOUS AS NEEDED
Status: DISCONTINUED | OUTPATIENT
Start: 2019-09-24 | End: 2019-09-24 | Stop reason: SURG

## 2019-09-24 RX ORDER — OXYCODONE HYDROCHLORIDE AND ACETAMINOPHEN 5; 325 MG/1; MG/1
1 TABLET ORAL EVERY 4 HOURS PRN
Qty: 20 TABLET | Refills: 0 | Status: SHIPPED | OUTPATIENT
Start: 2019-09-24 | End: 2020-02-25

## 2019-09-24 RX ORDER — LIDOCAINE HYDROCHLORIDE 10 MG/ML
0.5 INJECTION, SOLUTION EPIDURAL; INFILTRATION; INTRACAUDAL; PERINEURAL ONCE AS NEEDED
Status: DISCONTINUED | OUTPATIENT
Start: 2019-09-24 | End: 2019-09-24 | Stop reason: HOSPADM

## 2019-09-24 RX ORDER — SODIUM CHLORIDE, SODIUM LACTATE, POTASSIUM CHLORIDE, CALCIUM CHLORIDE 600; 310; 30; 20 MG/100ML; MG/100ML; MG/100ML; MG/100ML
125 INJECTION, SOLUTION INTRAVENOUS CONTINUOUS
Status: DISCONTINUED | OUTPATIENT
Start: 2019-09-24 | End: 2019-09-24 | Stop reason: HOSPADM

## 2019-09-24 RX ORDER — ROCURONIUM BROMIDE 10 MG/ML
INJECTION, SOLUTION INTRAVENOUS AS NEEDED
Status: DISCONTINUED | OUTPATIENT
Start: 2019-09-24 | End: 2019-09-24 | Stop reason: SURG

## 2019-09-24 RX ORDER — METOCLOPRAMIDE HYDROCHLORIDE 5 MG/ML
10 INJECTION INTRAMUSCULAR; INTRAVENOUS ONCE AS NEEDED
Status: DISCONTINUED | OUTPATIENT
Start: 2019-09-24 | End: 2019-09-24 | Stop reason: HOSPADM

## 2019-09-24 RX ORDER — MIDAZOLAM HYDROCHLORIDE 1 MG/ML
INJECTION INTRAMUSCULAR; INTRAVENOUS AS NEEDED
Status: DISCONTINUED | OUTPATIENT
Start: 2019-09-24 | End: 2019-09-24 | Stop reason: SURG

## 2019-09-24 RX ORDER — DIPHENHYDRAMINE HYDROCHLORIDE 50 MG/ML
12.5 INJECTION INTRAMUSCULAR; INTRAVENOUS ONCE AS NEEDED
Status: DISCONTINUED | OUTPATIENT
Start: 2019-09-24 | End: 2019-09-24 | Stop reason: HOSPADM

## 2019-09-24 RX ORDER — FENTANYL CITRATE/PF 50 MCG/ML
50 SYRINGE (ML) INJECTION
Status: DISCONTINUED | OUTPATIENT
Start: 2019-09-24 | End: 2019-09-24 | Stop reason: HOSPADM

## 2019-09-24 RX ORDER — DEXAMETHASONE SODIUM PHOSPHATE 4 MG/ML
INJECTION, SOLUTION INTRA-ARTICULAR; INTRALESIONAL; INTRAMUSCULAR; INTRAVENOUS; SOFT TISSUE AS NEEDED
Status: DISCONTINUED | OUTPATIENT
Start: 2019-09-24 | End: 2019-09-24 | Stop reason: SURG

## 2019-09-24 RX ORDER — CHLORHEXIDINE GLUCONATE 4 G/100ML
SOLUTION TOPICAL DAILY PRN
Status: DISCONTINUED | OUTPATIENT
Start: 2019-09-24 | End: 2019-09-24 | Stop reason: HOSPADM

## 2019-09-24 RX ORDER — HYDROMORPHONE HCL/PF 1 MG/ML
0.5 SYRINGE (ML) INJECTION
Status: DISCONTINUED | OUTPATIENT
Start: 2019-09-24 | End: 2019-09-24 | Stop reason: HOSPADM

## 2019-09-24 RX ORDER — MORPHINE SULFATE 4 MG/ML
2 INJECTION, SOLUTION INTRAMUSCULAR; INTRAVENOUS EVERY 4 HOURS PRN
Status: DISCONTINUED | OUTPATIENT
Start: 2019-09-24 | End: 2019-09-24 | Stop reason: HOSPADM

## 2019-09-24 RX ORDER — FENTANYL CITRATE 50 UG/ML
INJECTION, SOLUTION INTRAMUSCULAR; INTRAVENOUS AS NEEDED
Status: DISCONTINUED | OUTPATIENT
Start: 2019-09-24 | End: 2019-09-24 | Stop reason: SURG

## 2019-09-24 RX ORDER — EPHEDRINE SULFATE 50 MG/ML
INJECTION INTRAVENOUS AS NEEDED
Status: DISCONTINUED | OUTPATIENT
Start: 2019-09-24 | End: 2019-09-24 | Stop reason: SURG

## 2019-09-24 RX ORDER — GLYCOPYRROLATE 0.2 MG/ML
INJECTION INTRAMUSCULAR; INTRAVENOUS AS NEEDED
Status: DISCONTINUED | OUTPATIENT
Start: 2019-09-24 | End: 2019-09-24 | Stop reason: SURG

## 2019-09-24 RX ORDER — ONDANSETRON 2 MG/ML
4 INJECTION INTRAMUSCULAR; INTRAVENOUS ONCE AS NEEDED
Status: COMPLETED | OUTPATIENT
Start: 2019-09-24 | End: 2019-09-24

## 2019-09-24 RX ADMIN — SODIUM CHLORIDE, SODIUM LACTATE, POTASSIUM CHLORIDE, AND CALCIUM CHLORIDE 125 ML/HR: .6; .31; .03; .02 INJECTION, SOLUTION INTRAVENOUS at 06:46

## 2019-09-24 RX ADMIN — KETOROLAC TROMETHAMINE 30 MG: 30 INJECTION, SOLUTION INTRAMUSCULAR; INTRAVENOUS at 08:24

## 2019-09-24 RX ADMIN — EPHEDRINE SULFATE 10 MG: 50 INJECTION, SOLUTION INTRAVENOUS at 08:10

## 2019-09-24 RX ADMIN — GLYCOPYRROLATE 0.2 MG: 0.2 INJECTION, SOLUTION INTRAMUSCULAR; INTRAVENOUS at 08:07

## 2019-09-24 RX ADMIN — MIDAZOLAM HYDROCHLORIDE 2 MG: 1 INJECTION, SOLUTION INTRAMUSCULAR; INTRAVENOUS at 07:36

## 2019-09-24 RX ADMIN — SUGAMMADEX 200 MG: 100 INJECTION, SOLUTION INTRAVENOUS at 08:24

## 2019-09-24 RX ADMIN — ONDANSETRON 4 MG: 2 INJECTION INTRAMUSCULAR; INTRAVENOUS at 09:24

## 2019-09-24 RX ADMIN — PROPOFOL 200 MG: 10 INJECTION, EMULSION INTRAVENOUS at 07:39

## 2019-09-24 RX ADMIN — FENTANYL CITRATE 100 MCG: 50 INJECTION, SOLUTION INTRAMUSCULAR; INTRAVENOUS at 07:39

## 2019-09-24 RX ADMIN — LIDOCAINE HYDROCHLORIDE 50 MG: 10 INJECTION, SOLUTION INFILTRATION; PERINEURAL at 07:39

## 2019-09-24 RX ADMIN — ONDANSETRON HYDROCHLORIDE 4 MG: 2 INJECTION, SOLUTION INTRAVENOUS at 07:41

## 2019-09-24 RX ADMIN — SODIUM CHLORIDE, SODIUM LACTATE, POTASSIUM CHLORIDE, AND CALCIUM CHLORIDE: .6; .31; .03; .02 INJECTION, SOLUTION INTRAVENOUS at 08:31

## 2019-09-24 RX ADMIN — DEXAMETHASONE SODIUM PHOSPHATE 10 MG: 4 INJECTION, SOLUTION INTRAMUSCULAR; INTRAVENOUS at 07:41

## 2019-09-24 RX ADMIN — CEFAZOLIN SODIUM 2000 MG: 2 SOLUTION INTRAVENOUS at 07:40

## 2019-09-24 RX ADMIN — ROCURONIUM BROMIDE 50 MG: 10 SOLUTION INTRAVENOUS at 07:39

## 2019-09-24 RX ADMIN — HEPARIN SODIUM 5000 UNITS: 5000 INJECTION INTRAVENOUS; SUBCUTANEOUS at 06:46

## 2019-09-24 RX ADMIN — FENTANYL CITRATE 50 MCG: 50 INJECTION, SOLUTION INTRAMUSCULAR; INTRAVENOUS at 08:30

## 2019-09-24 RX ADMIN — FENTANYL CITRATE 50 MCG: 50 INJECTION, SOLUTION INTRAMUSCULAR; INTRAVENOUS at 08:36

## 2019-09-24 NOTE — DISCHARGE INSTRUCTIONS
Follow-up with Dr Gandara  2 weeks for point  Regular diet as tolerated  Low intensity activity as tolerated, no heavy lifting, nothing greater than 20 lb for 2 weeks  Dressing may be removed on Thursday  You may shower on Thursday  No baths or swimming  If developed fever, nausea vomiting, worsening pain, redness or drainage from incision call the office or go to the ER

## 2019-09-24 NOTE — OP NOTE
OPERATIVE REPORT  PATIENT NAME: Kaden Mckoy    :  1965  MRN: [id number]  Pt Location: [facility] OR ROOM 01    SURGERY DATE: 2019    Surgeon(s) and Role: * Kusum Maya DO - Primary     * Gilmar Barrera PA-C - Assisting    Preop Diagnosis:  Biliary colic [P36 10]    Post-Op Diagnosis Codes:     * Biliary colic [Y25 82]    Procedure(s) (LRB):  CHOLECYSTECTOMY LAPAROSCOPIC (N/A)    Specimen(s):  ID Type Source Tests Collected by Time Destination   1 : gallbladder with stone Tissue Gallbladder TISSUE EXAM Kusum DO Z[place] 2019 0824        Estimated Blood Loss:   Minimal    Drains:  * No LDAs found *    Anesthesia Type:   General    Operative Indications:  Biliary colic [H47 75]      Operative Findings:  Gallbladder with stone    Complications:   None    Procedure and Technique:  The patient was brought to the operating arena and placed in supine position  All regular monitoring devices were connected  The patient underwent general anesthesia with endotracheal intubation without complication  The patient received perioperative antibiotics  The patient received subcutaneous heparin in addition to bilateral lower extremity sequential compression devices for DVT prophylaxis  A timeout was performed prior to incision to ensure correct patient position, procedure, and site  A vertical supraumbilical incision was made using a 15 blade scalpel  Incision was deepened through the deep dermis and simultaneous fat using electrocautery  Hemostasis was obtained  Using S retractors dissection was completed down to the fascia  The fascia was visualized grasped with Brandie was elevated and incised  2 stay sutures of 0 Vicryl were then placed  A finger was inserted and the peritoneum palpated and using finger fracture technique the peritoneum was incised  The underside of the peritoneum was palpated and there was no evidence of any bowel or adhesions in the vicinity area   A Licona trocar was then placed under direct vision  The abdomen was insufflated with carbon dioxide to a pressure of 12-14 mmHg  The patient tolerated insufflation well  A laparoscope was then entered and there was no evidence of any trauma from the initial trocar placement  3 additional trochars were then placed in the following positions: A 5mm trocar was placed in the epigastrium, 2 additional 5 mm trochars were placed on the right costal margin  All trochars were inserted under direct vision and there is no evidence of any injury  The abdomen was inspected and there no abnormality   The patient was then placed in reverse trendelenberg and right side up position  An atraumatic grasper was placed through the lateral port and the gallbladder was grasped and retracted over the dome of the liver  Any filmy adhesion between the gallbladder and omentum, and/or other nearby organs were taken down with a combination of blunt and sharp technique  Additional atraumatic grasper was then placed in the infundibulum of the gallbladder was grasped and retracted to the right lower quadrant  The peritoneum was then was incised using Bovie electrocautery  The cystic artery and cystic duct were then circumferentially dissected  The cystic artery cystic duct were then doubly clipped and divided close the gallbladder  The gallbladder was then taken off the liver bed using hook electrocautery  Hemostasis was achieved with electrocautery  The gallbladder was in place an Endo Catch bag  The cystic artery was inspected and there is appeared to be no oozing  Cystic duct was also inspected and the clips were intact and appeared to be no leakage of bile  The upper abdominal trochars were then removed under direct vision there is no bleeding from trocar site  The Licona cannula was then removed along with the Endo Catch bag containing the gallbladder and the abdomen was allowed to desufflate  The fascia of the umbillical port was then closed using 0 Vicryl suture   The skin of all trochars were then closed with a 4-0 Monocryl  The patient tolerated procedure well was taken to the post anesthesia care unit in stable condition  All lap, needle, and instrument counts were correct  I was present for the entire procedure and A qualified resident physician was not available,  Maribel Donaldson PA-C, was required for adequate exposure, retraction, and suturing during the case      Patient Disposition:  PACU     SIGNATURE: Jes Paz DO  DATE: September 24, 2019  TIME: 8:37 AM

## 2019-09-24 NOTE — ANESTHESIA POSTPROCEDURE EVALUATION
Post-Op Assessment Note    CV Status:  Stable  Pain Score: 0    Pain management: adequate     Mental Status:  Alert and awake   Hydration Status:  Euvolemic   PONV Controlled:  Controlled   Airway Patency:  Patent   Post Op Vitals Reviewed: Yes      Staff: CRNA, Anesthesiologist           BP   136/84   Temp  99 5   Pulse  63   Resp   18   SpO2   99%

## 2019-09-24 NOTE — H&P
Patient seen and examined  Prior history reviewed  No change from prior  Will proceed to OR as planned  Blood pressure 135/88, pulse 61, temperature (!) 97 4 °F (36 3 °C), temperature source Tympanic, resp  rate 16, SpO2 96 %  H&P          Assessment/Plan:     Biliary colic  Patient with history of a cleaned symptoms of biliary colic  He has imaging showing a stone within the cystic duct  After long discussion patient states he like to have his gallbladder removed to prevent future recurrences  I think this is reasonab  The procedure self including all associated risks and benefits were discussed with the patient great length  Patient verbalized understands risks is willing to proceed, consent was signed  He has recently seen in the ER and had a thorough workup there and therefore not require any additional preoperative workup at this time          Diagnoses and all orders for this visit:     Biliary colic            Subjective:       Patient ID: Ashleigh Red is a 47 y o  male      50-year-old male with a history of excise a, presents today in consultation to discuss cholelithiasis  Patient recently seen in the ER for what he thought was more min exam he attack  At that time he did complain of some abdominal bloating and discomfort in the epigastric  Patient had a thorough workup and imaging revealed a stone within the cystic duct  After discussing with the patient he does have a history of T and had ablation which cure this problem  However on this particular episode he thought he was having again these palpitations but his workup in the ER down did not show this  I states he does have a history of a few times in late at night after eating having some upper abdominal and back pain  He does not attribute this to any reflux problems  He also complains of bloating  I suspect that given his recent findings of a stone this may be secondary to biliary colic    Currently denies any abdominal pain   No nausea vomiting  No fevers or chills  No changes in bowel bladder habits  No chest pain shortness of breath         The following portions of the patient's history were reviewed and updated as appropriate:   He  has a past medical history of GERD (gastroesophageal reflux disease), High cholesterol, Panic disorder, and SVT (supraventricular tachycardia) ([facility] )  He        Patient Active Problem List     Diagnosis Date Noted    Biliary colic 83/60/6100    History of supraventricular tachycardia 08/19/2019    GERD without esophagitis 12/03/2014      He  has a past surgical history that includes Cyst Removal and AV node ablation  His family history includes Diabetes in his mother and sister; Heart disease in his mother; Kidney disease in his mother; Liver cancer in his mother  He  reports that he quit smoking about 5 years ago  He has never used smokeless tobacco  He reports that he drinks alcohol  He reports that he does not use drugs  Current Outpatient Medications   Medication Sig Dispense Refill    aspirin 81 MG tablet Take 1 tablet by mouth daily        LORazepam (ATIVAN) 1 mg tablet Take 1 tablet (1 mg total) by mouth every 12 (twelve) hours as needed for anxiety (Do not drive or drink alcohol while using ) 11 tablet 0    ondansetron (ZOFRAN-ODT) 8 mg disintegrating tablet Take 1 tablet (8 mg total) by mouth every 8 (eight) hours as needed for nausea or vomiting 20 tablet 0      No current facility-administered medications for this visit        He has No Known Allergies        Review of Systems       A review of systems was completed, all negative except as noted above HPI      Objective:        /100   Pulse 55   Temp 97 9 °F (36 6 °C)   Ht 5' 6" (1 676 m)   Wt 107 kg (236 lb)   SpO2 98%   BMI 38 09 kg/m²              Physical Exam   Constitutional: He is oriented to person, place, and time  He appears well-developed and well-nourished  No distress     HENT:   Head: Normocephalic and atraumatic  Eyes: No scleral icterus  Neck: Normal range of motion  Neck supple  No tracheal deviation present  Cardiovascular: Normal rate, regular rhythm and normal heart sounds  Exam reveals no gallop and no friction rub  No murmur heard  Pulmonary/Chest: Breath sounds normal  No stridor  No respiratory distress  He has no wheezes  He has no rales  He exhibits no tenderness  Abdominal: Soft  He exhibits no distension and no mass  There is no tenderness  There is no rebound and no guarding  No hernia  Musculoskeletal: Normal range of motion  He exhibits no edema, tenderness or deformity  Lymphadenopathy:     He has no cervical adenopathy  Neurological: He is alert and oriented to person, place, and time  No cranial nerve deficit  Skin: Skin is warm  No rash noted  He is not diaphoretic  No erythema  No pallor  Psychiatric: He has a normal mood and affect   His behavior is normal    Vitals reviewed

## 2019-09-24 NOTE — ANESTHESIA PREPROCEDURE EVALUATION
Review of Systems/Medical History  Patient summary reviewed  Chart reviewed  No history of anesthetic complications     Cardiovascular  EKG reviewed, Hyperlipidemia, Dysrhythmias , history of PSVT,   Comment: Hx of AVNRT s/p ablation in 2017, now with sinus bradycardia, asymptomatic,  Pulmonary  Smoker ex-smoker  ,        GI/Hepatic    GERD ,   Comment: Biliary colic     Negative  ROS        Endo/Other  Negative endo/other ROS   Obesity    GYN       Hematology  Negative hematology ROS      Musculoskeletal  Negative musculoskeletal ROS        Neurology  Negative neurology ROS      Psychology   Anxiety,              Physical Exam    Airway    Mallampati score: II  TM Distance: >3 FB  Neck ROM: full     Dental   No notable dental hx     Cardiovascular  Rhythm: regular, Rate: normal, Cardiovascular exam normal    Pulmonary  Pulmonary exam normal Breath sounds clear to auscultation,     Other Findings        Anesthesia Plan  ASA Score- 2     Anesthesia Type- general with ASA Monitors  Additional Monitors:   Airway Plan: ETT  Plan Factors-    Induction- intravenous  Postoperative Plan- Plan for postoperative opioid use  Informed Consent- Anesthetic plan and risks discussed with patient  I personally reviewed this patient with the CRNA  Discussed and agreed on the Anesthesia Plan with the CRNA  Nadiya Steel Recent labs personally reviewed:  Lab Results   Component Value Date    WBC 6 07 08/16/2019    HGB 14 6 08/16/2019     08/16/2019     Lab Results   Component Value Date     08/19/2015    K 3 6 08/16/2019    BUN 12 08/16/2019    CREATININE 1 07 08/16/2019    GLUCOSE 91 08/19/2015     Lab Results   Component Value Date    PTT 32 06/22/2019      Lab Results   Component Value Date    INR 1 10 06/22/2019     Lab Results   Component Value Date    HGBA1C 5 9 (H) 01/27/2016       I, Reece Ewing MD, have personally seen and evaluated the patient prior to anesthetic care    I have reviewed the pre-anesthetic record, and other medical records if appropriate to the anesthetic care  If a CRNA is involved in the case, I have reviewed the CRNA assessment, if present, and agree  Risks/benefits and alternatives discussed with patient including possible PONV, sore throat, and possibility of rare anesthetic and surgical emergencies

## 2019-10-01 ENCOUNTER — HOSPITAL ENCOUNTER (OUTPATIENT)
Dept: NON INVASIVE DIAGNOSTICS | Facility: HOSPITAL | Age: 54
Discharge: HOME | End: 2019-10-01
Payer: MEDICARE

## 2019-10-01 ENCOUNTER — OFFICE VISIT (OUTPATIENT)
Dept: SURGERY | Facility: CLINIC | Age: 54
End: 2019-10-01

## 2019-10-01 VITALS
SYSTOLIC BLOOD PRESSURE: 136 MMHG | DIASTOLIC BLOOD PRESSURE: 88 MMHG | HEIGHT: 66 IN | HEART RATE: 71 BPM | TEMPERATURE: 98.3 F | WEIGHT: 238 LBS | BODY MASS INDEX: 38.25 KG/M2

## 2019-10-01 DIAGNOSIS — Z98.890 STATUS POST RADIOFREQUENCY ABLATION (RFA) OPERATION FOR ARRHYTHMIA: ICD-10-CM

## 2019-10-01 DIAGNOSIS — K80.50 BILIARY COLIC: Primary | ICD-10-CM

## 2019-10-01 DIAGNOSIS — Z86.79 STATUS POST RADIOFREQUENCY ABLATION (RFA) OPERATION FOR ARRHYTHMIA: ICD-10-CM

## 2019-10-01 PROCEDURE — 93306 TTE W/DOPPLER COMPLETE: CPT | Performed by: INTERNAL MEDICINE

## 2019-10-01 PROCEDURE — 93306 TTE W/DOPPLER COMPLETE: CPT

## 2019-10-01 PROCEDURE — 93225 XTRNL ECG REC<48 HRS REC: CPT

## 2019-10-01 PROCEDURE — 93226 XTRNL ECG REC<48 HR SCAN A/R: CPT

## 2019-10-01 PROCEDURE — 99024 POSTOP FOLLOW-UP VISIT: CPT | Performed by: SURGERY

## 2019-10-01 NOTE — PROGRESS NOTES
Assessment/Plan:    Biliary colic  Status post laparoscopic cholecystectomy  Overall doing well  Had some significant right lower quadrant pain which is now resolved  Incisions healing well  Tolerating diet  Pathology reviewed discussed with the patient  Follow-up kait Tnoey Diagnoses and all orders for this visit:    Biliary colic          Subjective:      Patient ID: Ger Falcon is a 49 y o  male  78-year-old woman status post laparoscopic cholecystectomy, presents today for postop check  He was doing well but then started having some severe right lower quadrant pain  This has since resolved  He is tolerating diet     No nausea vomiting  No fevers or chills  Bowel movements which initially had some constipation of now loosened up and he feels like things have improved  The following portions of the patient's history were reviewed and updated as appropriate:   He  has a past medical history of GERD (gastroesophageal reflux disease), Panic disorder, and [facility]  He   Patient Active Problem List    Diagnosis Date Noted    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 09/19/2019    History of supraventricular tachycardia 08/19/2019    GERD without esophagitis 12/03/2014     He  has a past surgical history that includes Cyst Removal; AV node ablation; and pr lap,cholecystectomy (N/A, 9/24/2019)  His family history includes Diabetes in his mother and sister; Heart disease in his mother; Kidney disease in his mother; Liver cancer in his mother  He  reports that he quit smoking about 5 years ago  He has never used smokeless tobacco  He reports that he drinks alcohol  He reports that he does not use drugs    Current Outpatient Medications   Medication Sig Dispense Refill    aspirin 81 MG tablet Take 1 tablet by mouth daily      omeprazole (PRILOSEC OTC) 20 MG tablet Take by mouth      oxyCODONE-acetaminophen (PERCOCET) 5-325 mg per tablet Take 1 tablet by mouth every 4 (four) hours as needed for moderate pain or severe painMax Daily Amount: 6 tablets 20 tablet 0     No current facility-administered medications for this visit  He has No Known Allergies       Review of Systems   Constitutional: Negative  Gastrointestinal: Negative  Objective:      /88   Pulse 71   Temp 98 3 °F (36 8 °C)   Ht 5' 6" (1 676 m)   Wt 108 kg (238 lb)   BMI 38 41 kg/m²          Physical Exam   Constitutional: He appears well-developed and well-nourished  No distress  Abdominal: Soft  He exhibits no distension and no mass  There is no tenderness  There is no rebound and no guarding  No hernia  Incisions x4, clean, dry, intact  Skin: He is not diaphoretic  Vitals reviewed

## 2019-10-01 NOTE — ASSESSMENT & PLAN NOTE
Status post laparoscopic cholecystectomy  Overall doing well  Had some significant right lower quadrant pain which is now resolved  Incisions healing well  Tolerating diet  Pathology reviewed discussed with the patient  Follow-up kait Johnson

## 2019-10-03 PROCEDURE — 93227 XTRNL ECG REC<48 HR R&I: CPT | Performed by: INTERNAL MEDICINE

## 2019-10-08 NOTE — PROGRESS NOTES
Received and completed a clearance form for Mr Edelmira Smith reviewed and signed, and form was faxed to [employer] at [phone]

## 2019-10-14 ENCOUNTER — TELEPHONE (OUTPATIENT)
Dept: SLEEP CENTER | Facility: CLINIC | Age: 54
End: 2019-10-14

## 2019-10-24 ENCOUNTER — HOSPITAL ENCOUNTER (OUTPATIENT)
Dept: SLEEP CENTER | Facility: HOSPITAL | Age: 54
Discharge: HOME | End: 2019-10-24
Payer: MEDICARE

## 2019-10-24 DIAGNOSIS — Z98.890 STATUS POST RADIOFREQUENCY ABLATION (RFA) OPERATION FOR ARRHYTHMIA: ICD-10-CM

## 2019-10-24 DIAGNOSIS — Z86.79 STATUS POST RADIOFREQUENCY ABLATION (RFA) OPERATION FOR ARRHYTHMIA: ICD-10-CM

## 2019-10-24 PROCEDURE — 95810 POLYSOM 6/> YRS 4/> PARAM: CPT

## 2019-10-24 PROCEDURE — 95810 POLYSOM 6/> YRS 4/> PARAM: CPT | Performed by: PSYCHIATRY & NEUROLOGY

## 2019-10-25 NOTE — PROGRESS NOTES
Sleep Study Documentation    Pre-Sleep Study       Sleep testing procedure explained to patient:YES    Patient napped prior to study:YES- less than 30 minutes  Napped after 2PM: yes    Caffeine:Dayshift worker after 12PM   Caffeine use:NO    Alcohol:Dayshift workers after 5PM: Alcohol use:NO    Typical day for patient:YES       Study Documentation    Sleep Study Indications: Snoring, Witnessed Apneas    Sleep Study: Diagnostic   Snore: Moderate  Supplemental O2: no    O2 flow rate (L/min) range N/A  O2 flow rate (L/min) final N/A  Minimum SaO2 89%  Baseline SaO2 95 5%        Mode of Therapy:    EKG abnormalities: NO  EEG abnormalities: no    Sleep Study Recorded < 2 hours: N/A    Sleep Study Recorded > 2 hours but incomplete study: N/A    Sleep Study Recorded 6 hours but no sleep obtained: NO    Patient classification: employed       Post-Sleep Study    Medication used at bedtime or during sleep study:NO    Patient reports time it took to fall asleep:greater than 60 minutes    Patient reports waking up during study:3 or more times  Patient reports returning to sleep in 10 to 30 minutes  Patient reports sleeping 4 to 6 hours with dreaming  Patient reports sleep during study:typical    Patient rated sleepiness: Somewhat sleepy or tired    PAP treatment:no

## 2019-10-28 ENCOUNTER — TELEPHONE (OUTPATIENT)
Dept: CARDIOLOGY CLINIC | Facility: CLINIC | Age: 54
End: 2019-10-28

## 2019-10-28 DIAGNOSIS — G47.30 SLEEP APNEA, UNSPECIFIED TYPE: Primary | ICD-10-CM

## 2019-10-28 NOTE — TELEPHONE ENCOUNTER
----- Message from Kalyan Nelson MD sent at 10/28/2019  1:33 PM EDT -----  Please call the patient regarding his abnormal result  refer to sleep clinic

## 2019-10-31 ENCOUNTER — TELEPHONE (OUTPATIENT)
Dept: SLEEP CENTER | Facility: CLINIC | Age: 54
End: 2019-10-31

## 2019-10-31 NOTE — TELEPHONE ENCOUNTER
Moderate TRES- needs consult with Dr Amber Linares  Left message to call office to discuss study results

## 2019-11-04 NOTE — TELEPHONE ENCOUNTER
Left message for patient, advised sleep study resulted and will be discussed at visit scheduled with  [facility] 1/17/2020  Advised to call piror to appointment with questions

## 2019-11-04 NOTE — TELEPHONE ENCOUNTER
Discussed study results- rescheduled consult from [facility] [facility] to [place], as patient lives in [place]

## 2019-12-16 ENCOUNTER — OFFICE VISIT (OUTPATIENT)
Dept: SLEEP CENTER | Facility: CLINIC | Age: 54
End: 2019-12-16
Payer: MEDICARE

## 2019-12-16 VITALS
SYSTOLIC BLOOD PRESSURE: 122 MMHG | BODY MASS INDEX: 39.37 KG/M2 | WEIGHT: 245 LBS | DIASTOLIC BLOOD PRESSURE: 70 MMHG | HEART RATE: 65 BPM | HEIGHT: 66 IN | OXYGEN SATURATION: 98 %

## 2019-12-16 DIAGNOSIS — G47.00 INSOMNIA, UNSPECIFIED TYPE: ICD-10-CM

## 2019-12-16 DIAGNOSIS — G47.33 OSA (OBSTRUCTIVE SLEEP APNEA): Primary | ICD-10-CM

## 2019-12-16 DIAGNOSIS — R35.1 NOCTURIA: ICD-10-CM

## 2019-12-16 DIAGNOSIS — G47.30 SLEEP APNEA, UNSPECIFIED TYPE: ICD-10-CM

## 2019-12-16 PROCEDURE — 99204 OFFICE O/P NEW MOD 45 MIN: CPT | Performed by: PSYCHIATRY & NEUROLOGY

## 2019-12-16 NOTE — PROGRESS NOTES
Sleep Medicine Consultation Note    HPI:  Mr Fiorella Levy is a 49 y o  male seen at the request of Reagan Chen MD for advice regarding suspected sleep disordered breathing  The patient underwent PSG in 10/2019  Either CPAP titration or APAP was recommended as he was unable to sleep well that night  Moderate TRES seen with an AHI of 16  He endorsed trouble sleeping for 3 years now  He stated that it started out with waking up frequently to void  This happens up to 10 times a night  This has worsened overtime  He spoke to his PCP about this and "I dont remember what he said, something about my prostate but I dont know " The patient did not follow up with that  He also has trouble falling asleep  Usually this is due to his wife watching TV int he room and then she is also up and down all night, "this keeps me up to " He has not tried anything to help with his sleep  Please see below for continuation of the HPI:      Sleep Disordered Breathing:  -Snoring: yes   -Severity: sometimes loud   -Frequency: every night   -Duration: a while   -Over time: stable   -Modifying factors: unsure  -Observed Apneas: denied  -Mouth Breathing at night: sometimes  -Dry Mouth in morning: yes   -Nocturnal Gasping: no  -Nasal Obstruction: yes  -Weight: gained 115 lbs in the last three years    Sleep Pattern:  -Location: bedroom  -Bed/Recliner/Wedge:  bed  -Bed Partner: wife  -HOB: flat  -# of pillows under head: 2  -Position: all over  -Bedtime: 10pm  -Lights out: same time  -Environmental: wife watches TV all night  -Latency: 1-2 hours  -Awakenings: up to 10 times a night   -Reason: void   -Duration: varies  -Wake time: 4am   -Alarm: yes  -Rise time: same time  -Days off: sleeps until noon at times  -Shift Work: M-F days  -Patient's estimate of total sleep time: 5h    Daytime Symptoms:  -Upon Awakening: refreshed at times, sometimes tired  -Daytime fatigue/sleepiness: not tired if he slept well   If he didn't sleep well, he is fatigued  -Naps: yes, once a day for 930-11am  -Involuntary Dozing: if he is very tired  -Cognitive Symptoms: denied  -Driving: Difficulty with sleepiness and driving:  Tired, but denied nodding off or falling asleep   -- Close calls related to sleepiness: denied   -- Accidents related to sleepiness: denied      Questionnaires:   Sitting and reading: Would never doze  Watching TV: High chance of dozing  Sitting, inactive in a public place (e g  a theatre or a meeting): High chance of dozing  As a passenger in a car for an hour without a break: Slight chance of dozing  Lying down to rest in the afternoon when circumstances permit: Slight chance of dozing  Sitting and talking to someone: Would never doze  Sitting quietly after a lunch without alcohol: Would never doze  In a car, while stopped for a few minutes in traffic: Would never doze  Total score: 8      Sleep Review of Symptoms:  -Parasomnias:  --Sleep Walking: denied  --Dream Enactment: denied  --Bruxism: yes  -Motor:  --RLS: denied  --PLMS: denied  -Narcolepsy:  --Hallucinations: denied  --Paralysis: denied  --Cataplexy: denied    Childhood Sleep History: denied    Prior Sleep Studies/Evaluations:  See HPI    Family History:  Family history of sleep disorders: denied    Patient Active Problem List   Diagnosis    GERD without esophagitis    History of supraventricular tachycardia    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    TRES (obstructive sleep apnea)     Past Medical History:   Diagnosis Date    GERD (gastroesophageal reflux disease)     Panic disorder     [facility] ([facility])        --> Denies any cardiopulmonary disease  --> Seizure hx: denies  --> Head injury with LOC: when he was very little  Doesn't know if he had LOC   --> Supplemental Oxygen Use: denies    Labs     Results for Remy Mena (MRN [id number]) as of 12/16/2019 10:21   Ref   Range 8/16/2019 11:40   Sodium Latest Ref Range: 136 - 145 mmol/L 140   Potassium Latest Ref Range: 3 5 - 5 3 mmol/L 3 6   Chloride Latest Ref Range: 100 - 108 mmol/L 103   CO2 Latest Ref Range: 21 - 32 mmol/L 25   Anion Gap Latest Ref Range: 4 - 13 mmol/L 12   BUN Latest Ref Range: 5 - 25 mg/dL 12   Creatinine Latest Ref Range: 0 60 - 1 30 mg/dL 1 07   Glucose, Random Latest Ref Range: 65 - 140 mg/dL 132   Calcium Latest Ref Range: 8 3 - 10 1 mg/dL 8 6   AST Latest Ref Range: 5 - 45 U/L 26   ALT Latest Ref Range: 12 - 78 U/L 65   Alkaline Phosphatase Latest Ref Range: 46 - 116 U/L 86   Total Protein Latest Ref Range: 6 4 - 8 2 g/dL 7 5   Albumin Latest Ref Range: 3 5 - 5 0 g/dL 3 6   TOTAL BILIRUBIN Latest Ref Range: 0 20 - 1 00 mg/dL 0 30   eGFR Latest Units: ml/min/1 73sq m 79   Magnesium Latest Ref Range: 1 6 - 2 6 mg/dL 2 1   Lipase Latest Ref Range: 73 - 393 u/L 179   Troponin I Latest Ref Range: <=0 04 ng/mL <0 02   WBC Latest Ref Range: 4 31 - 10 16 Thousand/uL 6 07   Red Blood Cell Count Latest Ref Range: 3 88 - 5 62 Million/uL 4 84   Hemoglobin Latest Ref Range: 12 0 - 17 0 g/dL 14 6   HCT Latest Ref Range: 36 5 - 49 3 % 43 0   MCV Latest Ref Range: 82 - 98 fL 89   MCH Latest Ref Range: 26 8 - 34 3 pg 30 2   MCHC Latest Ref Range: 31 4 - 37 4 g/dL 34 0   RDW Latest Ref Range: 11 6 - 15 1 % 12 8   Platelet Count Latest Ref Range: 149 - 390 Thousands/uL 184   MPV Latest Ref Range: 8 9 - 12 7 fL 10 4   nRBC Latest Units: /100 WBCs 0   Neutrophils % Latest Ref Range: 43 - 75 % 42 (L)   Immat GRANS % Latest Ref Range: 0 - 2 % 0   Lymphocytes Relative Latest Ref Range: 14 - 44 % 37   Monocytes Relative Latest Ref Range: 4 - 12 % 14 (H)   Eosinophils Latest Ref Range: 0 - 6 % 6   Basophils Relative Latest Ref Range: 0 - 1 % 1   Immature Grans Absolute Latest Ref Range: 0 00 - 0 20 Thousand/uL 0 02   Absolute Neutrophils Latest Ref Range: 1 85 - 7 62 Thousands/µL 2 59   Lymphocytes Absolute Latest Ref Range: 0 60 - 4 47 Thousands/µL 2 22   Absolute Monocytes Latest Ref Range: 0 17 - 1 22 Thousand/µL 0 83   Absolute Eosinophils Latest Ref Range: 0 00 - 0 61 Thousand/µL 0 35   Basophils Absolute Latest Ref Range: 0 00 - 0 10 Thousands/µL 0 06   TSH 3RD GENERATON Latest Ref Range: 0 358 - 3 740 uIU/mL 3 104   Free T4 Latest Ref Range: 0 76 - 1 46 ng/dL 0 83       Past Surgical History:   Procedure Laterality Date    AV NODE ABLATION      CYST REMOVAL      AZ LAP,CHOLECYSTECTOMY N/A 9/24/2019    Procedure: CHOLECYSTECTOMY LAPAROSCOPIC;  Surgeon: Anish Walter DO;  Location: [facility];  Service: General       --> ENT procedures: denies    Current Outpatient Medications   Medication Sig Dispense Refill    aspirin 81 MG tablet Take 1 tablet by mouth daily      omeprazole (PRILOSEC OTC) 20 MG tablet Take by mouth      oxyCODONE-acetaminophen (PERCOCET) 5-325 mg per tablet Take 1 tablet by mouth every 4 (four) hours as needed for moderate pain or severe painMax Daily Amount: 6 tablets (Patient not taking: Reported on 12/16/2019) 20 tablet 0     No current facility-administered medications for this visit  Social History:  -Employment:   -Smoking: denied   Quit 5 years ago  -Caffeine: 1 cup of coffee in the morning and at night drinks soda    -Alcohol: denied  -THC: denied  -OTC/Supplements/herbals: denied  -Illicits:  denies  -Family: lives with wife    ROS:  Genitourinary need to urinate more than twice a night   Cardiology none   Gastrointestinal none   Neurology none   Constitutional weight change   Integumentary rash or dry skin and itching   Psychiatry none   Musculoskeletal none   Pulmonary snoring   ENT ringing in ears   Endocrine none   Hematological none       MSE:  -Alert and appropriate: alert, calm, cooperative  -Oriented to person, place and time:  name, age, location, day/date/mon/yr  -Behavior: good, sustained eye contact  -Speech: Unremarkable rate/rhythm/volume  -Mood: "good, tired"  -Affect: full range  -Thought Processes: linear, logical, goal directed  PE:  Body mass index is 39 54 kg/m²  Vitals:    12/16/19 1015   BP: 122/70   BP Location: Right arm   Cuff Size: Large   Pulse: 65   SpO2: 98%   Weight: 111 kg (245 lb)   Height: 5' 6" (1 676 m)       -General:  In NAD    -Eyes: Conjunctival injection: none     -EOM:  PERRLA, EOMI   -Eyelid hooding: no    -ENT: MP: 4/4   -Facial deformity: no retrognathia   -Hard palate: moderate arch   -Soft palate: crowding with low set palate and 3+ tonsils   -Gums and teeth: normal dentition   -Tongue:  Scalloping   -Nares:  Patent    -Neck/Lymphatics: Lymphadenopathy:  none appreciated   -Masses:  none appreciated   -Circumference: Neck Circumference: 17 5 "    -Cardiac: Auscultation:  RRR   - LE edema over shins: trace bilaterally    -Pulm: -Respirations: unlaboured         -Auscultation:  CTA bilaterally, posterior fields    -Neuro: No resting tremor     -Musculoskeletal: Gait and stance: normal turning and ambulation; unremarkable  Assessment:  Mr Marisa Grant is a 49 y o  male who is seen to evaluate for possible obstructive sleep apnea  The patient has poor sleep and nocturia  He has insomnia secondary to his environment as his significant other watches TV throughout the night  He also drinks soda all night,, which also keeps him awake and promotes nocturia  Discussed the results of his PSG  The pathophysiology of, the reasons to treat and treatment options for obstructive sleep apnea were all reviewed with the patient today  Discussed the testing options and reviewed the benefits and downsides of both, patient opted APAP  He is amenable to treatment with PAP therapy  Discussed keeping nasal passages clear, abstaining from alcohol, and other sedating drugs at night- which will worsen symptoms of TRES  --History provided by: patient  --Records reviewed: in chart and PSG      Recommendations:  1) APAP 6-16cm  2) Driving safety was reviewed with patient    If the patient feels too sleepy to drive he knows not to drive  If he becomes sleepy while driving he will pull over and nap  3) Follow-up in 6-8 weeks  4) STOP SODA AND CAFFEINE AT 3PM   5) Call with any questions or concerns  Insomnia:  1) Get up at the same time seven days out of the week  2) Only go to bed when feeling sleepy  3) Wind down in the evening without electronics  4) Stimulus Control: If lying in bed for 15-20 minutes (estimated because the clock is turned away so you cannot see it) and you are not asleep get up and do something relaxing in a different room (reading a magazine article, solitaire with a deck of cards)  Do this in the middle of the night as well if awake  Avoid doing work or getting on the computer  5) Bedroom for sleep only  No watching TV or using electronics (computer, phone, tablet etc )  in bed  6) Turn clock away so you cannot see it in bed  7) Exercise regularly but try to avoid exercise within 4 hours of bedtime  Morning exercise is best     8) Avoid caffeine in the afternoon  Considering tapering down on caffeine by decreasing by one beverage with caffeine every 3 days until off  9) Avoid smoking near bedtime    10) Avoid alcohol before bed  If you consume one alcoholic beverage allow 3 hours between that drink and bedtime  If you consume two alcoholic beverages allow 5 hours  Between those drinks and bedtime  Alcohol may lead to waking at night  11) Avoid napping except for driving safety  If you feel to sleepy to drive do not drive  If you get sleepy while driving pull over and nap  You may resume driving once you feel alert  12) Read "No More Sleepless Nights" by Grisel Ortega PhD     13) There are some on-line resources that do require a fee that can be of help  Two credible websites are as follows:  http://Stumpedia/cbt-online-insomnia-treatment html  [employer] si  An bhavya used by the [place] is as follows:  CBT-I   Go!  To Sleep by the [facility]  All questions answered for the patient, who indicated understanding and agreed with the plan       Cisco Riley MD  Psychiatry/ Sleep medicine

## 2019-12-16 NOTE — LETTER
December 16, 2019     Fabiano Mayer DO  [facility]    Patient: Jasmin Martinez   YOB: 1965   Date of Visit: 12/16/2019       Dear Dr Jennifer Hollins: Thank you for referring Jasmin Martinez to me for evaluation  Below are my notes for this consultation  If you have questions, please do not hesitate to call me  I look forward to following your patient along with you  Sincerely,        Donal Munguia MD        CC: MD Donal Garcia MD  12/16/2019 10:47 AM  Sign at close encounter  Sleep Medicine Consultation Note    HPI:  Mr Jasmin Martinez is a 49 y o  male seen at the request of Tolu Beckham MD for advice regarding suspected sleep disordered breathing  The patient underwent PSG in 10/2019  Either CPAP titration or APAP was recommended as he was unable to sleep well that night  Moderate TRES seen with an AHI of 16  He endorsed trouble sleeping for 3 years now  He stated that it started out with waking up frequently to void  This happens up to 10 times a night  This has worsened overtime  He spoke to his PCP about this and "I dont remember what he said, something about my prostate but I dont know " The patient did not follow up with that  He also has trouble falling asleep  Usually this is due to his wife watching TV int he room and then she is also up and down all night, "this keeps me up to " He has not tried anything to help with his sleep       Please see below for continuation of the HPI:      Sleep Disordered Breathing:  -Snoring: yes   -Severity: sometimes loud   -Frequency: every night   -Duration: a while   -Over time: stable   -Modifying factors: unsure  -Observed Apneas: denied  -Mouth Breathing at night: sometimes  -Dry Mouth in morning: yes   -Nocturnal Gasping: no  -Nasal Obstruction: yes  -Weight: gained 115 lbs in the last three years    Sleep Pattern:  -Location: bedroom  -Bed/Recliner/Wedge:  bed  -Bed Partner: wife  -HOB: flat  -# of pillows under head: 2  -Position: all over  -Bedtime: 10pm  -Lights out: same time  -Environmental: wife watches TV all night  -Latency: 1-2 hours  -Awakenings: up to 10 times a night   -Reason: void   -Duration: varies  -Wake time: 4am   -Alarm: yes  -Rise time: same time  -Days off: sleeps until noon at times  -Shift Work: M-F days  -Patient's estimate of total sleep time: 5h    Daytime Symptoms:  -Upon Awakening: refreshed at times, sometimes tired  -Daytime fatigue/sleepiness: not tired if he slept well   If he didn't sleep well, he is fatigued  -Naps: yes, once a day for 930-11am  -Involuntary Dozing: if he is very tired  -Cognitive Symptoms: denied  -Driving: Difficulty with sleepiness and driving:  Tired, but denied nodding off or falling asleep   -- Close calls related to sleepiness: denied   -- Accidents related to sleepiness: denied      Questionnaires:   Sitting and reading: Would never doze  Watching TV: High chance of dozing  Sitting, inactive in a public place (e g  a theatre or a meeting): High chance of dozing  As a passenger in a car for an hour without a break: Slight chance of dozing  Lying down to rest in the afternoon when circumstances permit: Slight chance of dozing  Sitting and talking to someone: Would never doze  Sitting quietly after a lunch without alcohol: Would never doze  In a car, while stopped for a few minutes in traffic: Would never doze  Total score: 8      Sleep Review of Symptoms:  -Parasomnias:  --Sleep Walking: denied  --Dream Enactment: denied  --Bruxism: yes  -Motor:  --RLS: denied  --PLMS: denied  -Narcolepsy:  --Hallucinations: denied  --Paralysis: denied  --Cataplexy: denied    Childhood Sleep History: denied    Prior Sleep Studies/Evaluations:  See HPI    Family History:  Family history of sleep disorders: denied    Patient Active Problem List   Diagnosis    GERD without esophagitis    History of supraventricular tachycardia    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    TRES (obstructive sleep apnea)     Past Medical History:   Diagnosis Date    GERD (gastroesophageal reflux disease)     Panic disorder     SVT (supraventricular tachycardia) (HCC)        --> Denies any cardiopulmonary disease  --> Seizure hx: denies  --> Head injury with LOC: when he was very little  Doesn't know if he had LOC   --> Supplemental Oxygen Use: denies    Labs     Results for Demario Gay (MRN [id number]) as of 12/16/2019 10:21   Ref   Range 8/16/2019 11:40   Sodium Latest Ref Range: 136 - 145 mmol/L 140   Potassium Latest Ref Range: 3 5 - 5 3 mmol/L 3 6   Chloride Latest Ref Range: 100 - 108 mmol/L 103   CO2 Latest Ref Range: 21 - 32 mmol/L 25   Anion Gap Latest Ref Range: 4 - 13 mmol/L 12   BUN Latest Ref Range: 5 - 25 mg/dL 12   Creatinine Latest Ref Range: 0 60 - 1 30 mg/dL 1 07   Glucose, Random Latest Ref Range: 65 - 140 mg/dL 132   Calcium Latest Ref Range: 8 3 - 10 1 mg/dL 8 6   AST Latest Ref Range: 5 - 45 U/L 26   ALT Latest Ref Range: 12 - 78 U/L 65   Alkaline Phosphatase Latest Ref Range: 46 - 116 U/L 86   Total Protein Latest Ref Range: 6 4 - 8 2 g/dL 7 5   Albumin Latest Ref Range: 3 5 - 5 0 g/dL 3 6   TOTAL BILIRUBIN Latest Ref Range: 0 20 - 1 00 mg/dL 0 30   eGFR Latest Units: ml/min/1 73sq m 79   Magnesium Latest Ref Range: 1 6 - 2 6 mg/dL 2 1   Lipase Latest Ref Range: 73 - 393 u/L 179   Troponin I Latest Ref Range: <=0 04 ng/mL <0 02   WBC Latest Ref Range: 4 31 - 10 16 Thousand/uL 6 07   Red Blood Cell Count Latest Ref Range: 3 88 - 5 62 Million/uL 4 84   Hemoglobin Latest Ref Range: 12 0 - 17 0 g/dL 14 6   HCT Latest Ref Range: 36 5 - 49 3 % 43 0   MCV Latest Ref Range: 82 - 98 fL 89   MCH Latest Ref Range: 26 8 - 34 3 pg 30 2   MCHC Latest Ref Range: 31 4 - 37 4 g/dL 34 0   RDW Latest Ref Range: 11 6 - 15 1 % 12 8   Platelet Count Latest Ref Range: 149 - 390 Thousands/uL 184   MPV Latest Ref Range: 8 9 - 12 7 fL 10 4 nRBC Latest Units: /100 WBCs 0   Neutrophils % Latest Ref Range: 43 - 75 % 42 (L)   Immat GRANS % Latest Ref Range: 0 - 2 % 0   Lymphocytes Relative Latest Ref Range: 14 - 44 % 37   Monocytes Relative Latest Ref Range: 4 - 12 % 14 (H)   Eosinophils Latest Ref Range: 0 - 6 % 6   Basophils Relative Latest Ref Range: 0 - 1 % 1   Immature Grans Absolute Latest Ref Range: 0 00 - 0 20 Thousand/uL 0 02   Absolute Neutrophils Latest Ref Range: 1 85 - 7 62 Thousands/µL 2 59   Lymphocytes Absolute Latest Ref Range: 0 60 - 4 47 Thousands/µL 2 22   Absolute Monocytes Latest Ref Range: 0 17 - 1 22 Thousand/µL 0 83   Absolute Eosinophils Latest Ref Range: 0 00 - 0 61 Thousand/µL 0 35   Basophils Absolute Latest Ref Range: 0 00 - 0 10 Thousands/µL 0 06   TSH 3RD GENERATON Latest Ref Range: 0 358 - 3 740 uIU/mL 3 104   Free T4 Latest Ref Range: 0 76 - 1 46 ng/dL 0 83       Past Surgical History:   Procedure Laterality Date    AV NODE ABLATION      CYST REMOVAL      IN LAP,CHOLECYSTECTOMY N/A 9/24/2019    Procedure: CHOLECYSTECTOMY LAPAROSCOPIC;  Surgeon: Worley Severs, DO;  Location: [facility];  Service: General       --> ENT procedures: denies    Current Outpatient Medications   Medication Sig Dispense Refill    aspirin 81 MG tablet Take 1 tablet by mouth daily      omeprazole (PRILOSEC OTC) 20 MG tablet Take by mouth      oxyCODONE-acetaminophen (PERCOCET) 5-325 mg per tablet Take 1 tablet by mouth every 4 (four) hours as needed for moderate pain or severe painMax Daily Amount: 6 tablets (Patient not taking: Reported on 12/16/2019) 20 tablet 0     No current facility-administered medications for this visit  Social History:  -Employment:   -Smoking: denied   Quit 5 years ago  -Caffeine: 1 cup of coffee in the morning and at night drinks soda    -Alcohol: denied  -THC: denied  -OTC/Supplements/herbals: denied  -Illicits:  denies  -Family: lives with wife    ROS:  Genitourinary need to urinate more than twice a night   Cardiology none   Gastrointestinal none   Neurology none   Constitutional weight change   Integumentary rash or dry skin and itching   Psychiatry none   Musculoskeletal none   Pulmonary snoring   ENT ringing in ears   Endocrine none   Hematological none       MSE:  -Alert and appropriate: alert, calm, cooperative  -Oriented to person, place and time:  name, age, location, day/date/mon/yr  -Behavior: good, sustained eye contact  -Speech: Unremarkable rate/rhythm/volume  -Mood: "good, tired"  -Affect: full range  -Thought Processes: linear, logical, goal directed  PE:  Body mass index is 39 54 kg/m²  Vitals:    12/16/19 1015   BP: 122/70   BP Location: Right arm   Cuff Size: Large   Pulse: 65   SpO2: 98%   Weight: 111 kg (245 lb)   Height: 5' 6" (1 676 m)       -General:  In NAD    -Eyes: Conjunctival injection: none     -EOM:  PERRLA, EOMI   -Eyelid hooding: no    -ENT: MP: 4/4   -Facial deformity: no retrognathia   -Hard palate: moderate arch   -Soft palate: crowding with low set palate and 3+ tonsils   -Gums and teeth: normal dentition   -Tongue:  Scalloping   -Nares:  Patent    -Neck/Lymphatics: Lymphadenopathy:  none appreciated   -Masses:  none appreciated   -Circumference: Neck Circumference: 17 5 "    -Cardiac: Auscultation:  RRR   - LE edema over shins: trace bilaterally    -Pulm: -Respirations: unlaboured         -Auscultation:  CTA bilaterally, posterior fields    -Neuro: No resting tremor     -Musculoskeletal: Gait and stance: normal turning and ambulation; unremarkable  Assessment:  Mr Gina Alicia is a 49 y o  male who is seen to evaluate for possible obstructive sleep apnea  The patient has poor sleep and nocturia  He has insomnia secondary to his environment as his significant other watches TV throughout the night  He also drinks soda all night,, which also keeps him awake and promotes nocturia  Discussed the results of his PSG    The pathophysiology of, the reasons to treat and treatment options for obstructive sleep apnea were all reviewed with the patient today  Discussed the testing options and reviewed the benefits and downsides of both, patient opted APAP  He is amenable to treatment with PAP therapy  Discussed keeping nasal passages clear, abstaining from alcohol, and other sedating drugs at night- which will worsen symptoms of TRES  --History provided by: patient  --Records reviewed: in chart and PSG      Recommendations:  1) APAP 6-16cm  2) Driving safety was reviewed with patient  If the patient feels too sleepy to drive he knows not to drive  If he becomes sleepy while driving he will pull over and nap  3) Follow-up in 6-8 weeks  4) STOP SODA AND CAFFEINE AT 3PM   5) Call with any questions or concerns  Insomnia:  1) Get up at the same time seven days out of the week  2) Only go to bed when feeling sleepy  3) Wind down in the evening without electronics  4) Stimulus Control: If lying in bed for 15-20 minutes (estimated because the clock is turned away so you cannot see it) and you are not asleep get up and do something relaxing in a different room (reading a magazine article, solitaire with a deck of cards)  Do this in the middle of the night as well if awake  Avoid doing work or getting on the computer  5) Bedroom for sleep only  No watching TV or using electronics (computer, phone, tablet etc )  in bed  6) Turn clock away so you cannot see it in bed  7) Exercise regularly but try to avoid exercise within 4 hours of bedtime  Morning exercise is best     8) Avoid caffeine in the afternoon  Considering tapering down on caffeine by decreasing by one beverage with caffeine every 3 days until off  9) Avoid smoking near bedtime    10) Avoid alcohol before bed  If you consume one alcoholic beverage allow 3 hours between that drink and bedtime  If you consume two alcoholic beverages allow 5 hours  Between those drinks and bedtime    Alcohol may lead to waking at night  11) Avoid napping except for driving safety  If you feel to sleepy to drive do not drive  If you get sleepy while driving pull over and nap  You may resume driving once you feel alert  12) Read "No More Sleepless Nights" by Tiffanie Flores PhD     13) There are some on-line resources that do require a fee that can be of help  Two credible websites are as follows:  http://[employer]/cbt-online-insomnia-treatment html  [employer]  An bhavya used by the [place] is as follows:  CBT-I   Go! To Sleep by the [facility]  All questions answered for the patient, who indicated understanding and agreed with the plan       Glendy Calvillo MD  Psychiatry/ Sleep medicine

## 2019-12-16 NOTE — PATIENT INSTRUCTIONS
Recommendations:  1) APAP 6-16cm  2) Driving safety was reviewed with patient  If the patient feels too sleepy to drive he knows not to drive  If he becomes sleepy while driving he will pull over and nap  3) Follow-up in 6-8 weeks  4) STOP SODA AND CAFFEINE AT 3PM   5) Call with any questions or concerns  Insomnia:  1) Get up at the same time seven days out of the week  2) Only go to bed when feeling sleepy  3) Wind down in the evening without electronics  4) Stimulus Control: If lying in bed for 15-20 minutes (estimated because the clock is turned away so you cannot see it) and you are not asleep get up and do something relaxing in a different room (reading a magazine article, solitaire with a deck of cards)  Do this in the middle of the night as well if awake  Avoid doing work or getting on the computer  5) Bedroom for sleep only  No watching TV or using electronics (computer, phone, tablet etc )  in bed  6) Turn clock away so you cannot see it in bed  7) Exercise regularly but try to avoid exercise within 4 hours of bedtime  Morning exercise is best     8) Avoid caffeine in the afternoon  Considering tapering down on caffeine by decreasing by one beverage with caffeine every 3 days until off  9) Avoid smoking near bedtime    10) Avoid alcohol before bed  If you consume one alcoholic beverage allow 3 hours between that drink and bedtime  If you consume two alcoholic beverages allow 5 hours  Between those drinks and bedtime  Alcohol may lead to waking at night  11) Avoid napping except for driving safety  If you feel to sleepy to drive do not drive  If you get sleepy while driving pull over and nap  You may resume driving once you feel alert  12) Read "No More Sleepless Nights" by Cheli Hernandez PhD     13) There are some on-line resources that do require a fee that can be of help    Two credible websites are as follows:  http://[employer]/cbt-online-insomnia-treatment html  [employer]  An bhavya used by the [place] is as follows:  CBT-I   Go! To Sleep by the [facility]

## 2019-12-27 ENCOUNTER — TELEPHONE (OUTPATIENT)
Dept: SLEEP CENTER | Facility: CLINIC | Age: 54
End: 2019-12-27

## 2020-02-21 ENCOUNTER — OFFICE VISIT (OUTPATIENT)
Dept: FAMILY MEDICINE CLINIC | Facility: CLINIC | Age: 55
End: 2020-02-21
Payer: MEDICARE

## 2020-02-21 VITALS
HEIGHT: 66 IN | WEIGHT: 249.8 LBS | DIASTOLIC BLOOD PRESSURE: 82 MMHG | HEART RATE: 68 BPM | OXYGEN SATURATION: 97 % | SYSTOLIC BLOOD PRESSURE: 126 MMHG | TEMPERATURE: 98.2 F | BODY MASS INDEX: 40.15 KG/M2

## 2020-02-21 DIAGNOSIS — R22.0 SCALP LUMP: ICD-10-CM

## 2020-02-21 DIAGNOSIS — H61.23 IMPACTED CERUMEN OF BOTH EARS: ICD-10-CM

## 2020-02-21 DIAGNOSIS — D22.9 ATYPICAL NEVI: Primary | ICD-10-CM

## 2020-02-21 PROCEDURE — 3008F BODY MASS INDEX DOCD: CPT | Performed by: PHYSICIAN ASSISTANT

## 2020-02-21 PROCEDURE — 1036F TOBACCO NON-USER: CPT | Performed by: PHYSICIAN ASSISTANT

## 2020-02-21 PROCEDURE — 99214 OFFICE O/P EST MOD 30 MIN: CPT | Performed by: PHYSICIAN ASSISTANT

## 2020-02-21 NOTE — PROGRESS NOTES
Assessment/Plan:    Problem List Items Addressed This Visit     None      Visit Diagnoses     Atypical nevi    -  Primary    Relevant Orders    Ambulatory referral to Dermatology    Scalp lump        Relevant Orders    Ambulatory referral to Dermatology    Impacted cerumen of both ears               Diagnoses and all orders for this visit:    Atypical nevi  -     Ambulatory referral to Dermatology; Future    Scalp lump  -     Ambulatory referral to Dermatology; Future    Impacted cerumen of both ears        Referral placed to dermatology to have atypical nevi and scalp lump evaluated  I advised him that it is most likely consistent with a cyst  He will let us know if it changes in shape, color, or size  Ears are impacted with cerumen  Advised him to try Debrox to soften wax and return for an ear irrigation if they are still bothering him  Subjective:      Patient ID: Vinay Chisholm is a 49 y o  male  [facility] harsha is a 49year old male who is here today because he noticed a lump on the back of his head  He states that he noticed it about 2-3 days ago  It has not changed shape or size  It is not painful  He denies any drainage  He denies any trauma to his head  He also has a lesion on the right side of his head that he would like looked at  It has been there for a few years and seems to be getting larger  He also notes that his ears feel blocked  He denies any pain, drainage, or fevers  The following portions of the patient's history were reviewed and updated as appropriate:   He has a past medical history of GERD (gastroesophageal reflux disease), Panic disorder, and SVT (supraventricular tachycardia) ([facility] [address] )  ,  does not have any pertinent problems on file  ,   has a past surgical history that includes Cyst Removal; AV node ablation; and pr lap,cholecystectomy (N/A, 9/24/2019)  ,  family history includes Diabetes in his mother and sister;  Heart disease in his mother; Kidney disease in his mother; Liver cancer in his mother  ,   reports that he quit smoking about 6 years ago  He has never used smokeless tobacco  He reports that he drank alcohol  He reports that he does not use drugs  ,  has No Known Allergies     Current Outpatient Medications   Medication Sig Dispense Refill    aspirin 81 MG tablet Take 1 tablet by mouth daily      omeprazole (PRILOSEC OTC) 20 MG tablet Take by mouth      oxyCODONE-acetaminophen (PERCOCET) 5-325 mg per tablet Take 1 tablet by mouth every 4 (four) hours as needed for moderate pain or severe painMax Daily Amount: 6 tablets 20 tablet 0     No current facility-administered medications for this visit  Review of Systems   Constitutional: Negative for chills, diaphoresis, fatigue and fever  HENT: Negative for congestion, ear pain, postnasal drip, rhinorrhea, sneezing, sore throat and trouble swallowing  Eyes: Negative for pain and visual disturbance  Respiratory: Negative for apnea, cough, shortness of breath and wheezing  Cardiovascular: Negative for chest pain and palpitations  Gastrointestinal: Negative for abdominal pain, constipation, diarrhea, nausea and vomiting  Genitourinary: Negative for dysuria and hematuria  Musculoskeletal: Negative for arthralgias, gait problem and myalgias  Skin:        Lump on right side of scalp  Skin lesion on right temple   Neurological: Negative for dizziness, syncope, weakness, light-headedness, numbness and headaches  Psychiatric/Behavioral: Negative for suicidal ideas  The patient is not nervous/anxious  Objective:  Vitals:    02/21/20 1251   BP: 126/82   Pulse: 68   Temp: 98 2 °F (36 8 °C)   SpO2: 97%   Weight: 113 kg (249 lb 12 8 oz)   Height: 5' 6" (1 676 m)     Body mass index is 40 32 kg/m²  Physical Exam   Constitutional: He is oriented to person, place, and time  He appears well-developed and well-nourished  HENT:   Head: Normocephalic and atraumatic     Right Ear: Tympanic membrane, external ear and ear canal normal    Left Ear: Tympanic membrane, external ear and ear canal normal    Nose: Nose normal    Mouth/Throat: Oropharynx is clear and moist and mucous membranes are normal  No oropharyngeal exudate, posterior oropharyngeal edema or posterior oropharyngeal erythema  Bilateral ear canals impacted with cerumen   Eyes: Pupils are equal, round, and reactive to light  EOM are normal    Neck: Normal range of motion  Neck supple  Cardiovascular: Normal rate, regular rhythm and normal heart sounds  Exam reveals no gallop and no friction rub  No murmur heard  Pulmonary/Chest: Effort normal and breath sounds normal  No respiratory distress  He has no wheezes  He has no rales  Musculoskeletal: Normal range of motion  Lymphadenopathy:     He has no cervical adenopathy  Neurological: He is alert and oriented to person, place, and time  Skin: Skin is warm and dry  +Palpable lump measuring approximately 1 cm in diameter on left lower side of scalp  Mobile with no tenderness or erythema   +Pigmented atypical raised lesion on right temple   Psychiatric: He has a normal mood and affect  His behavior is normal  Judgment and thought content normal    Vitals reviewed

## 2020-02-25 ENCOUNTER — OFFICE VISIT (OUTPATIENT)
Dept: FAMILY MEDICINE CLINIC | Facility: CLINIC | Age: 55
End: 2020-02-25
Payer: MEDICARE

## 2020-02-25 VITALS
DIASTOLIC BLOOD PRESSURE: 76 MMHG | HEART RATE: 76 BPM | WEIGHT: 240 LBS | SYSTOLIC BLOOD PRESSURE: 120 MMHG | HEIGHT: 66 IN | TEMPERATURE: 99.1 F | BODY MASS INDEX: 38.57 KG/M2 | OXYGEN SATURATION: 95 %

## 2020-02-25 DIAGNOSIS — J02.9 PHARYNGITIS, UNSPECIFIED ETIOLOGY: Primary | ICD-10-CM

## 2020-02-25 DIAGNOSIS — J11.1 INFLUENZA: ICD-10-CM

## 2020-02-25 PROBLEM — I47.1 PAROXYSMAL SVT (SUPRAVENTRICULAR TACHYCARDIA) (HCC): Status: ACTIVE | Noted: 2020-02-25

## 2020-02-25 PROBLEM — I47.19 AVNRT (AV NODAL RE-ENTRY TACHYCARDIA): Status: ACTIVE | Noted: 2020-02-25

## 2020-02-25 PROBLEM — I47.10 PAROXYSMAL SVT (SUPRAVENTRICULAR TACHYCARDIA): Status: ACTIVE | Noted: 2020-02-25

## 2020-02-25 PROBLEM — I47.1 AVNRT (AV NODAL RE-ENTRY TACHYCARDIA) (HCC): Status: ACTIVE | Noted: 2020-02-25

## 2020-02-25 PROBLEM — R00.1 SINUS BRADYCARDIA: Status: ACTIVE | Noted: 2020-02-25

## 2020-02-25 PROCEDURE — 3008F BODY MASS INDEX DOCD: CPT | Performed by: PHYSICIAN ASSISTANT

## 2020-02-25 PROCEDURE — 1036F TOBACCO NON-USER: CPT | Performed by: PHYSICIAN ASSISTANT

## 2020-02-25 PROCEDURE — 99214 OFFICE O/P EST MOD 30 MIN: CPT | Performed by: PHYSICIAN ASSISTANT

## 2020-02-25 RX ORDER — AZITHROMYCIN 250 MG/1
TABLET, FILM COATED ORAL
Qty: 6 TABLET | Refills: 0 | Status: SHIPPED | OUTPATIENT
Start: 2020-02-25 | End: 2020-02-29

## 2020-02-25 NOTE — PROGRESS NOTES
Assessment/Plan:    Problem List Items Addressed This Visit     None      Visit Diagnoses     Pharyngitis, unspecified etiology    -  Primary    Relevant Medications    azithromycin (Zithromax) 250 mg tablet    Influenza               Diagnoses and all orders for this visit:    Pharyngitis, unspecified etiology  -     azithromycin (Zithromax) 250 mg tablet; Day 1 take 2 tablets, days 2-5 take 1 tablet  Influenza        Continue to keep fever managed, stay hydrated with plenty of water  Recommend warm salt water gargles or warm tea with honey to soothe throat  Subjective:      Patient ID: Brien Lui is a 49 y o  male  Joleen Ellis is here today due to acute illness since Sunday (2/23/2020)  Symptoms include vomiting/diarrhea, fever/chills, decreased appetite, body aches, sore throat, cough, runny nose  He is taking OTC fever reducer  He did not measure his temperature at home, he did not have a flu vaccination this year  The following portions of the patient's history were reviewed and updated as appropriate:   He has a past medical history of GERD (gastroesophageal reflux disease), Panic disorder, and SVT (supraventricular tachycardia) ([facility] [address] )  ,  does not have any pertinent problems on file  ,   has a past surgical history that includes Cyst Removal; AV node ablation; and pr lap,cholecystectomy (N/A, 9/24/2019)  ,  family history includes Diabetes in his mother and sister; Heart disease in his mother; Kidney disease in his mother; Liver cancer in his mother  ,   reports that he quit smoking about 6 years ago  He has never used smokeless tobacco  He reports that he drank alcohol  He reports that he does not use drugs  ,  has No Known Allergies     Current Outpatient Medications   Medication Sig Dispense Refill    aspirin 81 MG tablet Take 1 tablet by mouth daily      omeprazole (PRILOSEC OTC) 20 MG tablet Take by mouth      azithromycin (Zithromax) 250 mg tablet Day 1 take 2 tablets, days 2-5 take 1 tablet   6 tablet 0     No current facility-administered medications for this visit  Review of Systems   Constitutional: Positive for appetite change, chills, fatigue and fever  Negative for activity change, diaphoresis and unexpected weight change  HENT: Positive for rhinorrhea and sore throat  Negative for congestion, ear pain, postnasal drip, sinus pressure, sinus pain, sneezing, tinnitus and voice change  Eyes: Negative for pain, redness and visual disturbance  Respiratory: Positive for cough  Negative for chest tightness, shortness of breath and wheezing  Cardiovascular: Negative for chest pain, palpitations and leg swelling  Gastrointestinal: Positive for diarrhea and vomiting  Negative for abdominal pain, blood in stool, constipation and nausea  Genitourinary: Negative for difficulty urinating, dysuria, frequency, hematuria and urgency  Musculoskeletal: Positive for arthralgias and myalgias  Negative for back pain, gait problem, joint swelling, neck pain and neck stiffness  Skin: Negative for color change, pallor, rash and wound  Neurological: Negative for dizziness, tremors, weakness, light-headedness and headaches  Psychiatric/Behavioral: Negative for dysphoric mood, self-injury, sleep disturbance and suicidal ideas  The patient is not nervous/anxious  Objective:  Vitals:    02/25/20 1220   BP: 120/76   Pulse: 76   Temp: 99 1 °F (37 3 °C)   SpO2: 95%   Weight: 109 kg (240 lb)   Height: 5' 6" (1 676 m)     Body mass index is 38 74 kg/m²  Physical Exam   Constitutional: He is oriented to person, place, and time  He appears well-developed and well-nourished  He appears ill  No distress  HENT:   Head: Normocephalic and atraumatic  Right Ear: Hearing, external ear and ear canal normal  Tympanic membrane is erythematous  Left Ear: Hearing, external ear and ear canal normal  Tympanic membrane is erythematous  Nose: Mucosal edema and rhinorrhea present     Mouth/Throat: Uvula is midline and mucous membranes are normal  Posterior oropharyngeal edema and posterior oropharyngeal erythema present  No oropharyngeal exudate or tonsillar abscesses  No tonsillar exudate  Eyes: Conjunctivae are normal  Right eye exhibits no discharge  Left eye exhibits no discharge  No scleral icterus  Neck: Neck supple  Carotid bruit is not present  No thyromegaly present  Cardiovascular: Normal rate, regular rhythm and normal heart sounds  No murmur heard  Pulmonary/Chest: Effort normal and breath sounds normal  No respiratory distress  He has no wheezes  Abdominal: Soft  Bowel sounds are normal  He exhibits no distension and no mass  There is no tenderness  There is no rebound and no guarding  Musculoskeletal: Normal range of motion  He exhibits no edema or tenderness  Lymphadenopathy:     He has cervical adenopathy  Neurological: He is alert and oriented to person, place, and time  Skin: Skin is warm and dry  No rash noted  He is not diaphoretic  No erythema  Psychiatric: He has a normal mood and affect  His behavior is normal  Judgment and thought content normal    Vitals reviewed

## 2020-02-25 NOTE — LETTER
February 25, 2020     Patient: Doris Siddiqui   YOB: 1965   Date of Visit: 2/25/2020       To Whom it May Concern:    Doris Siddiqui is under my professional care  He was seen in my office on 2/25/2020  He may return to work on 3/2/2020  Please also excuse from work 2/24/2020  If you have any questions or concerns, please don't hesitate to call           Sincerely,          Nani Helm PA-C        CC: No Recipients

## 2020-03-12 ENCOUNTER — TELEPHONE (OUTPATIENT)
Dept: FAMILY MEDICINE CLINIC | Facility: CLINIC | Age: 55
End: 2020-03-12

## 2020-03-12 DIAGNOSIS — R05.9 COUGH: Primary | ICD-10-CM

## 2020-03-12 RX ORDER — BENZONATATE 100 MG/1
100 CAPSULE ORAL 3 TIMES DAILY PRN
Qty: 30 CAPSULE | Refills: 0 | Status: SHIPPED | OUTPATIENT
Start: 2020-03-12 | End: 2020-11-17 | Stop reason: ALTCHOICE

## 2020-06-09 ENCOUNTER — APPOINTMENT (OUTPATIENT)
Dept: LAB | Facility: MEDICAL CENTER | Age: 55
End: 2020-06-09
Payer: MEDICARE

## 2020-06-09 ENCOUNTER — OFFICE VISIT (OUTPATIENT)
Dept: FAMILY MEDICINE CLINIC | Facility: CLINIC | Age: 55
End: 2020-06-09
Payer: MEDICARE

## 2020-06-09 VITALS
TEMPERATURE: 97.4 F | HEART RATE: 82 BPM | HEIGHT: 66 IN | SYSTOLIC BLOOD PRESSURE: 108 MMHG | WEIGHT: 239.6 LBS | DIASTOLIC BLOOD PRESSURE: 72 MMHG | OXYGEN SATURATION: 94 % | BODY MASS INDEX: 38.51 KG/M2

## 2020-06-09 DIAGNOSIS — Z12.5 SCREENING FOR PROSTATE CANCER: ICD-10-CM

## 2020-06-09 DIAGNOSIS — R53.83 FATIGUE, UNSPECIFIED TYPE: ICD-10-CM

## 2020-06-09 DIAGNOSIS — R35.0 FREQUENT URINATION: ICD-10-CM

## 2020-06-09 DIAGNOSIS — Z20.822 EXPOSURE TO 2019 NOVEL CORONAVIRUS: ICD-10-CM

## 2020-06-09 DIAGNOSIS — R35.1 NOCTURIA: ICD-10-CM

## 2020-06-09 DIAGNOSIS — R05.9 COUGH: ICD-10-CM

## 2020-06-09 DIAGNOSIS — R35.0 FREQUENT URINATION: Primary | ICD-10-CM

## 2020-06-09 LAB
ALBUMIN SERPL BCP-MCNC: 3.8 G/DL (ref 3.5–5)
ALP SERPL-CCNC: 95 U/L (ref 46–116)
ALT SERPL W P-5'-P-CCNC: 56 U/L (ref 12–78)
ANION GAP SERPL CALCULATED.3IONS-SCNC: 3 MMOL/L (ref 4–13)
AST SERPL W P-5'-P-CCNC: 22 U/L (ref 5–45)
BILIRUB SERPL-MCNC: 0.55 MG/DL (ref 0.2–1)
BUN SERPL-MCNC: 14 MG/DL (ref 5–25)
CALCIUM SERPL-MCNC: 9.1 MG/DL (ref 8.3–10.1)
CHLORIDE SERPL-SCNC: 112 MMOL/L (ref 100–108)
CO2 SERPL-SCNC: 27 MMOL/L (ref 21–32)
CREAT SERPL-MCNC: 1.1 MG/DL (ref 0.6–1.3)
ERYTHROCYTE [DISTWIDTH] IN BLOOD BY AUTOMATED COUNT: 13.2 % (ref 11.6–15.1)
GFR SERPL CREATININE-BSD FRML MDRD: 76 ML/MIN/1.73SQ M
GLUCOSE P FAST SERPL-MCNC: 122 MG/DL (ref 65–99)
HCT VFR BLD AUTO: 44.7 % (ref 36.5–49.3)
HGB BLD-MCNC: 14.7 G/DL (ref 12–17)
MCH RBC QN AUTO: 30.2 PG (ref 26.8–34.3)
MCHC RBC AUTO-ENTMCNC: 32.9 G/DL (ref 31.4–37.4)
MCV RBC AUTO: 92 FL (ref 82–98)
PLATELET # BLD AUTO: 209 THOUSANDS/UL (ref 149–390)
PMV BLD AUTO: 11.1 FL (ref 8.9–12.7)
POTASSIUM SERPL-SCNC: 4.1 MMOL/L (ref 3.5–5.3)
PROT SERPL-MCNC: 7.9 G/DL (ref 6.4–8.2)
PSA SERPL-MCNC: 0.4 NG/ML (ref 0–4)
RBC # BLD AUTO: 4.86 MILLION/UL (ref 3.88–5.62)
SL AMB  POCT GLUCOSE, UA: NORMAL
SL AMB LEUKOCYTE ESTERASE,UA: NORMAL
SL AMB POCT BILIRUBIN,UA: NORMAL
SL AMB POCT BLOOD,UA: NORMAL
SL AMB POCT CLARITY,UA: CLEAR
SL AMB POCT COLOR,UA: YELLOW
SL AMB POCT KETONES,UA: NORMAL
SL AMB POCT NITRITE,UA: NORMAL
SL AMB POCT PH,UA: 6
SL AMB POCT SPECIFIC GRAVITY,UA: 1.03
SL AMB POCT URINE PROTEIN: NORMAL
SL AMB POCT UROBILINOGEN: 0.2
SODIUM SERPL-SCNC: 142 MMOL/L (ref 136–145)
WBC # BLD AUTO: 6.23 THOUSAND/UL (ref 4.31–10.16)

## 2020-06-09 PROCEDURE — 84153 ASSAY OF PSA TOTAL: CPT

## 2020-06-09 PROCEDURE — 81002 URINALYSIS NONAUTO W/O SCOPE: CPT | Performed by: PHYSICIAN ASSISTANT

## 2020-06-09 PROCEDURE — 99214 OFFICE O/P EST MOD 30 MIN: CPT | Performed by: PHYSICIAN ASSISTANT

## 2020-06-09 PROCEDURE — 85027 COMPLETE CBC AUTOMATED: CPT

## 2020-06-09 PROCEDURE — 87086 URINE CULTURE/COLONY COUNT: CPT | Performed by: PHYSICIAN ASSISTANT

## 2020-06-09 PROCEDURE — 80053 COMPREHEN METABOLIC PANEL: CPT

## 2020-06-09 PROCEDURE — 36415 COLL VENOUS BLD VENIPUNCTURE: CPT

## 2020-06-09 PROCEDURE — U0003 INFECTIOUS AGENT DETECTION BY NUCLEIC ACID (DNA OR RNA); SEVERE ACUTE RESPIRATORY SYNDROME CORONAVIRUS 2 (SARS-COV-2) (CORONAVIRUS DISEASE [COVID-19]), AMPLIFIED PROBE TECHNIQUE, MAKING USE OF HIGH THROUGHPUT TECHNOLOGIES AS DESCRIBED BY CMS-2020-01-R: HCPCS | Performed by: PHYSICIAN ASSISTANT

## 2020-06-09 PROCEDURE — 3008F BODY MASS INDEX DOCD: CPT | Performed by: PHYSICIAN ASSISTANT

## 2020-06-09 PROCEDURE — 1036F TOBACCO NON-USER: CPT | Performed by: PHYSICIAN ASSISTANT

## 2020-06-10 LAB — BACTERIA UR CULT: NORMAL

## 2020-06-11 LAB — SARS-COV-2 RNA SPEC QL NAA+PROBE: NOT DETECTED

## 2020-06-12 ENCOUNTER — TELEPHONE (OUTPATIENT)
Dept: OTHER | Facility: OTHER | Age: 55
End: 2020-06-12

## 2020-06-12 DIAGNOSIS — R73.09 ELEVATED GLUCOSE: Primary | ICD-10-CM

## 2020-07-24 ENCOUNTER — HOSPITAL ENCOUNTER (EMERGENCY)
Facility: HOSPITAL | Age: 55
Discharge: HOME | End: 2020-07-24
Attending: EMERGENCY MEDICINE | Admitting: EMERGENCY MEDICINE
Payer: MEDICARE

## 2020-07-24 VITALS
DIASTOLIC BLOOD PRESSURE: 90 MMHG | OXYGEN SATURATION: 97 % | TEMPERATURE: 97.6 F | HEART RATE: 100 BPM | RESPIRATION RATE: 16 BRPM | BODY MASS INDEX: 38.25 KG/M2 | SYSTOLIC BLOOD PRESSURE: 134 MMHG | WEIGHT: 237 LBS

## 2020-07-24 DIAGNOSIS — S69.90XA INJURY OF FINGER: Primary | ICD-10-CM

## 2020-07-24 PROCEDURE — 99283 EMERGENCY DEPT VISIT LOW MDM: CPT | Performed by: EMERGENCY MEDICINE

## 2020-07-24 PROCEDURE — 99283 EMERGENCY DEPT VISIT LOW MDM: CPT

## 2020-07-24 PROCEDURE — 90715 TDAP VACCINE 7 YRS/> IM: CPT | Performed by: EMERGENCY MEDICINE

## 2020-07-24 PROCEDURE — 96372 THER/PROPH/DIAG INJ SC/IM: CPT

## 2020-07-24 PROCEDURE — 90471 IMMUNIZATION ADMIN: CPT

## 2020-07-24 RX ADMIN — TETANUS TOXOID, REDUCED DIPHTHERIA TOXOID AND ACELLULAR PERTUSSIS VACCINE, ADSORBED 0.5 ML: 5; 2.5; 8; 8; 2.5 SUSPENSION INTRAMUSCULAR at 19:39

## 2020-07-24 NOTE — ED NOTES
Pt has L index finger avulsion, cleansed with skintegrity     Domingo Rothman, GUSTAVO  20/22/09 6917

## 2020-07-24 NOTE — ED PROVIDER NOTES
History  Chief Complaint   Patient presents with    Finger Injury     Sp hand injury, index finger was pinched by washer machine  Co lac to the ant aspect  No motor or sensation changes  States skin defect  Titnius is not utd  Denies crush injury           Prior to Admission Medications   Prescriptions Last Dose Informant Patient Reported? Taking?   aspirin 81 MG tablet  Self Yes Yes   Sig: Take 1 tablet by mouth daily   benzonatate (TESSALON PERLES) 100 mg capsule   No No   Sig: Take 1 capsule (100 mg total) by mouth 3 (three) times a day as needed for cough   Patient not taking: Reported on 2020   esomeprazole (NexIUM) 20 mg capsule  Self Yes Yes   Sig: Take 20 mg by mouth every morning before breakfast   omeprazole (PRILOSEC OTC) 20 MG tablet  Self Yes Yes   Sig: Take by mouth      Facility-Administered Medications: None       Past Medical History:   Diagnosis Date    GERD (gastroesophageal reflux disease)     Panic disorder     SVT (supraventricular tachycardia) (HCC)        Past Surgical History:   Procedure Laterality Date    AV NODE ABLATION      CYST REMOVAL      SD LAP,CHOLECYSTECTOMY N/A 2019    Procedure: CHOLECYSTECTOMY LAPAROSCOPIC;  Surgeon: Carolyne Dejesus DO;  Location: [facility];  Service: General       Family History   Problem Relation Age of Onset    Diabetes Mother     Kidney disease Mother     Liver cancer Mother     Heart disease Mother     Diabetes Sister      I have reviewed and agree with the history as documented  E-Cigarette/Vaping    E-Cigarette Use Never User      E-Cigarette/Vaping Substances     Social History     Tobacco Use    Smoking status: Former Smoker     Last attempt to quit: 2013     Years since quittin 7    Smokeless tobacco: Never Used   Substance Use Topics    Alcohol use: Not Currently     Comment: social    Drug use: No       Review of Systems   All other systems reviewed and are negative        Physical Exam  Physical Exam Constitutional: He is oriented to person, place, and time  No distress  HENT:   Head: Normocephalic and atraumatic  Right Ear: External ear normal    Left Ear: External ear normal    Nose: Nose normal    Mouth/Throat: Oropharynx is clear and moist  No oropharyngeal exudate  Eyes: Pupils are equal, round, and reactive to light  Conjunctivae and EOM are normal  Right eye exhibits no discharge  Left eye exhibits no discharge  Neck: Normal range of motion  Neck supple  No JVD present  No tracheal deviation present  Cardiovascular: Normal heart sounds and intact distal pulses  Exam reveals no gallop and no friction rub  No murmur heard  Pulmonary/Chest: No stridor  No respiratory distress  He has no wheezes  He has no rales  He exhibits no tenderness  Abdominal: Soft  Bowel sounds are normal  He exhibits no distension and no mass  There is no tenderness  There is no rebound and no guarding  No hernia  Musculoskeletal: Normal range of motion  He exhibits no edema, tenderness or deformity  Index finger left hand, there is a ant skin defect about 1 cm long, motor and sensation intact  Good cap refill  Wound consistent with  injury  Neurological: He is alert and oriented to person, place, and time  He displays normal reflexes  No sensory deficit  He exhibits normal muscle tone  Skin: Skin is warm and dry  Capillary refill takes less than 2 seconds  No rash noted  He is not diaphoretic  No pallor  Psychiatric: He has a normal mood and affect         Vital Signs  ED Triage Vitals [07/24/20 1905]   Temperature Pulse Respirations Blood Pressure SpO2   97 6 °F (36 4 °C) (!) 117 16 134/90 97 %      Temp src Heart Rate Source Patient Position - Orthostatic VS BP Location FiO2 (%)   -- -- -- -- --      Pain Score       3           Vitals:    07/24/20 1905 07/24/20 1947   BP: 134/90 134/90   Pulse: (!) 117 100         Visual Acuity      ED Medications  Medications   tetanus-diphtheria-acellular pertussis (BOOSTRIX) IM injection 0 5 mL (0 5 mL Intramuscular Given 7/24/20 1939)       Diagnostic Studies  Results Reviewed     None                 No orders to display              Procedures  Procedures         ED Course       US AUDIT      Most Recent Value   Initial Alcohol Screen: US AUDIT-C    1  How often do you have a drink containing alcohol? 1 Filed at: 07/24/2020 1907   2  How many drinks containing alcohol do you have on a typical day you are drinking? 1 Filed at: 07/24/2020 1907   3a  Male UNDER 65: How often do you have five or more drinks on one occasion? 0 Filed at: 07/24/2020 1907   3b  FEMALE Any Age, or MALE 65+: How often do you have 4 or more drinks on one occassion? 0 Filed at: 07/24/2020 1907   Audit-C Score  2 Filed at: 07/24/2020 1907                  MARCIANO/DAST-10      Most Recent Value   How many times in the past year have you    Used an illegal drug or used a prescription medication for non-medical reasons? Never Filed at: 07/24/2020 1908                                MDM  Number of Diagnoses or Management Options  Diagnosis management comments: Sp finger injury, wound to ant finger  Skin defect, nothing to suture  Surgicel applied, bleeding controlled  Red flag /infections discussion, he has voiced understanding on when to return to er and need for fu/  Disposition  Final diagnoses:   Injury of finger     Time reflects when diagnosis was documented in both MDM as applicable and the Disposition within this note     Time User Action Codes Description Comment    7/24/2020  7:28 PM Narvis Grade Add [S69 [id number]] Injury of finger       ED Disposition     ED Disposition Condition Date/Time Comment    Discharge Stable Fri Jul 24, 2020  7:28 PM [facility] discharge to home/self care              Follow-up Information     Follow up With Specialties Details Why Contact Info    Rod Galvan PA-C Physician Assistant Schedule an appointment as soon as possible for a visit in [facility]  [phone]            Discharge Medication List as of 7/24/2020  7:29 PM      CONTINUE these medications which have NOT CHANGED    Details   aspirin 81 MG tablet Take 1 tablet by mouth daily, Historical Med      esomeprazole (NexIUM) 20 mg capsule Take 20 mg by mouth every morning before breakfast, Historical Med      omeprazole (PRILOSEC OTC) 20 MG tablet Take by mouth, Historical Med      benzonatate (TESSALON PERLES) 100 mg capsule Take 1 capsule (100 mg total) by mouth 3 (three) times a day as needed for cough, Starting Thu 3/12/2020, Normal           No discharge procedures on file      PDMP Review     None          ED Provider  Electronically Signed by           Yobany Diamond MD  07/26/20 0478

## 2020-07-24 NOTE — DISCHARGE INSTRUCTIONS
Return to er with uncontrolled bleeding, concern for infection such as swelling, redness or pus from wound /fever  Return with any other concerns  See pcp for follow up in 3-5 days

## 2020-10-30 ENCOUNTER — OFFICE VISIT (OUTPATIENT)
Dept: FAMILY MEDICINE CLINIC | Facility: CLINIC | Age: 55
End: 2020-10-30
Payer: MEDICARE

## 2020-10-30 VITALS
DIASTOLIC BLOOD PRESSURE: 98 MMHG | BODY MASS INDEX: 39.41 KG/M2 | TEMPERATURE: 97.5 F | SYSTOLIC BLOOD PRESSURE: 158 MMHG | HEIGHT: 66 IN | OXYGEN SATURATION: 97 % | WEIGHT: 245.2 LBS | HEART RATE: 63 BPM

## 2020-10-30 DIAGNOSIS — M94.0 COSTOCHONDRITIS: ICD-10-CM

## 2020-10-30 DIAGNOSIS — H61.23 BILATERAL HEARING LOSS DUE TO CERUMEN IMPACTION: Primary | ICD-10-CM

## 2020-10-30 DIAGNOSIS — K21.9 GERD WITHOUT ESOPHAGITIS: ICD-10-CM

## 2020-10-30 DIAGNOSIS — G47.33 OSA (OBSTRUCTIVE SLEEP APNEA): ICD-10-CM

## 2020-10-30 DIAGNOSIS — Z13.31 DEPRESSION SCREENING NEGATIVE: ICD-10-CM

## 2020-10-30 PROCEDURE — 99214 OFFICE O/P EST MOD 30 MIN: CPT | Performed by: PHYSICIAN ASSISTANT

## 2020-10-30 PROCEDURE — 69209 REMOVE IMPACTED EAR WAX UNI: CPT | Performed by: PHYSICIAN ASSISTANT

## 2020-10-30 RX ORDER — OMEPRAZOLE 20 MG/1
20 TABLET, DELAYED RELEASE ORAL DAILY
Qty: 30 TABLET | Refills: 2 | Status: SHIPPED | OUTPATIENT
Start: 2020-10-30

## 2020-11-17 ENCOUNTER — CONSULT (OUTPATIENT)
Dept: BARIATRICS | Facility: CLINIC | Age: 55
End: 2020-11-17
Payer: MEDICARE

## 2020-11-17 VITALS
WEIGHT: 238 LBS | HEART RATE: 63 BPM | RESPIRATION RATE: 16 BRPM | SYSTOLIC BLOOD PRESSURE: 126 MMHG | BODY MASS INDEX: 37.35 KG/M2 | HEIGHT: 67 IN | TEMPERATURE: 97.8 F | DIASTOLIC BLOOD PRESSURE: 78 MMHG

## 2020-11-17 DIAGNOSIS — R63.5 WEIGHT GAIN, ABNORMAL: ICD-10-CM

## 2020-11-17 DIAGNOSIS — G47.33 OSA (OBSTRUCTIVE SLEEP APNEA): ICD-10-CM

## 2020-11-17 DIAGNOSIS — K21.9 GERD WITHOUT ESOPHAGITIS: ICD-10-CM

## 2020-11-17 DIAGNOSIS — E66.9 OBESITY, CLASS II, BMI 35-39.9: Primary | ICD-10-CM

## 2020-11-17 PROBLEM — E66.812 OBESITY, CLASS II, BMI 35-39.9: Status: ACTIVE | Noted: 2020-11-17

## 2020-11-17 PROCEDURE — 99204 OFFICE O/P NEW MOD 45 MIN: CPT | Performed by: PHYSICIAN ASSISTANT

## 2020-11-18 ENCOUNTER — LAB (OUTPATIENT)
Dept: LAB | Facility: MEDICAL CENTER | Age: 55
End: 2020-11-18
Payer: MEDICARE

## 2020-11-18 DIAGNOSIS — E66.9 OBESITY, CLASS II, BMI 35-39.9: ICD-10-CM

## 2020-11-18 DIAGNOSIS — R73.09 ELEVATED GLUCOSE: ICD-10-CM

## 2020-11-18 DIAGNOSIS — K21.9 GERD WITHOUT ESOPHAGITIS: ICD-10-CM

## 2020-11-18 DIAGNOSIS — R63.5 WEIGHT GAIN, ABNORMAL: ICD-10-CM

## 2020-11-18 DIAGNOSIS — G47.33 OSA (OBSTRUCTIVE SLEEP APNEA): ICD-10-CM

## 2020-11-18 LAB
ALBUMIN SERPL BCP-MCNC: 3.7 G/DL (ref 3.5–5)
ALP SERPL-CCNC: 115 U/L (ref 46–116)
ALT SERPL W P-5'-P-CCNC: 57 U/L (ref 12–78)
ANION GAP SERPL CALCULATED.3IONS-SCNC: 8 MMOL/L (ref 4–13)
AST SERPL W P-5'-P-CCNC: 32 U/L (ref 5–45)
BILIRUB SERPL-MCNC: 0.68 MG/DL (ref 0.2–1)
BUN SERPL-MCNC: 12 MG/DL (ref 5–25)
CALCIUM SERPL-MCNC: 9.3 MG/DL (ref 8.3–10.1)
CHLORIDE SERPL-SCNC: 109 MMOL/L (ref 100–108)
CHOLEST SERPL-MCNC: 195 MG/DL (ref 50–200)
CO2 SERPL-SCNC: 26 MMOL/L (ref 21–32)
CREAT SERPL-MCNC: 1.09 MG/DL (ref 0.6–1.3)
EST. AVERAGE GLUCOSE BLD GHB EST-MCNC: 169 MG/DL
GFR SERPL CREATININE-BSD FRML MDRD: 77 ML/MIN/1.73SQ M
GLUCOSE P FAST SERPL-MCNC: 144 MG/DL (ref 65–99)
HBA1C MFR BLD: 7.5 %
HDLC SERPL-MCNC: 39 MG/DL
INSULIN SERPL-ACNC: 23.5 MU/L (ref 3–25)
LDLC SERPL CALC-MCNC: 108 MG/DL (ref 0–100)
NONHDLC SERPL-MCNC: 156 MG/DL
POTASSIUM SERPL-SCNC: 4.6 MMOL/L (ref 3.5–5.3)
PROT SERPL-MCNC: 7.8 G/DL (ref 6.4–8.2)
SODIUM SERPL-SCNC: 143 MMOL/L (ref 136–145)
TRIGL SERPL-MCNC: 239 MG/DL
TSH SERPL DL<=0.05 MIU/L-ACNC: 2.67 UIU/ML (ref 0.36–3.74)

## 2020-11-18 PROCEDURE — 84443 ASSAY THYROID STIM HORMONE: CPT

## 2020-11-18 PROCEDURE — 80053 COMPREHEN METABOLIC PANEL: CPT

## 2020-11-18 PROCEDURE — 83036 HEMOGLOBIN GLYCOSYLATED A1C: CPT

## 2020-11-18 PROCEDURE — 80061 LIPID PANEL: CPT

## 2020-11-18 PROCEDURE — 83525 ASSAY OF INSULIN: CPT

## 2020-11-18 PROCEDURE — 36415 COLL VENOUS BLD VENIPUNCTURE: CPT

## 2020-12-01 ENCOUNTER — OFFICE VISIT (OUTPATIENT)
Dept: FAMILY MEDICINE CLINIC | Facility: CLINIC | Age: 55
End: 2020-12-01
Payer: MEDICARE

## 2020-12-01 ENCOUNTER — OFFICE VISIT (OUTPATIENT)
Dept: BARIATRICS | Facility: CLINIC | Age: 55
End: 2020-12-01

## 2020-12-01 VITALS — WEIGHT: 237.7 LBS | BODY MASS INDEX: 37.31 KG/M2 | HEIGHT: 67 IN

## 2020-12-01 VITALS
OXYGEN SATURATION: 95 % | HEART RATE: 78 BPM | WEIGHT: 241.4 LBS | TEMPERATURE: 97.5 F | SYSTOLIC BLOOD PRESSURE: 138 MMHG | BODY MASS INDEX: 37.89 KG/M2 | HEIGHT: 67 IN | DIASTOLIC BLOOD PRESSURE: 92 MMHG

## 2020-12-01 DIAGNOSIS — I10 ESSENTIAL HYPERTENSION: ICD-10-CM

## 2020-12-01 DIAGNOSIS — E78.2 MIXED HYPERLIPIDEMIA: ICD-10-CM

## 2020-12-01 DIAGNOSIS — Z00.00 MEDICARE ANNUAL WELLNESS VISIT, SUBSEQUENT: ICD-10-CM

## 2020-12-01 DIAGNOSIS — R63.5 ABNORMAL WEIGHT GAIN: ICD-10-CM

## 2020-12-01 DIAGNOSIS — B35.1 ONYCHOMYCOSIS: ICD-10-CM

## 2020-12-01 DIAGNOSIS — E11.9 TYPE 2 DIABETES MELLITUS WITHOUT COMPLICATION, WITHOUT LONG-TERM CURRENT USE OF INSULIN (HCC): Primary | ICD-10-CM

## 2020-12-01 DIAGNOSIS — Z13.31 DEPRESSION SCREENING NEGATIVE: ICD-10-CM

## 2020-12-01 DIAGNOSIS — Z13.5 ENCOUNTER FOR SCREENING FOR DIABETIC RETINOPATHY: ICD-10-CM

## 2020-12-01 LAB
LEFT EYE DIABETIC RETINOPATHY: NORMAL
LEFT EYE IMAGE QUALITY: NORMAL
LEFT EYE MACULAR EDEMA: NORMAL
LEFT EYE OTHER RETINOPATHY: NORMAL
RIGHT EYE DIABETIC RETINOPATHY: NORMAL
RIGHT EYE IMAGE QUALITY: NORMAL
RIGHT EYE MACULAR EDEMA: NORMAL
RIGHT EYE OTHER RETINOPATHY: NORMAL
SEVERITY (EYE EXAM): NORMAL

## 2020-12-01 PROCEDURE — 99214 OFFICE O/P EST MOD 30 MIN: CPT | Performed by: PHYSICIAN ASSISTANT

## 2020-12-01 PROCEDURE — RECHECK

## 2020-12-01 PROCEDURE — G0439 PPPS, SUBSEQ VISIT: HCPCS | Performed by: PHYSICIAN ASSISTANT

## 2020-12-01 PROCEDURE — VLCD

## 2020-12-01 PROCEDURE — 92250 FUNDUS PHOTOGRAPHY W/I&R: CPT | Performed by: PHYSICIAN ASSISTANT

## 2020-12-01 RX ORDER — ATORVASTATIN CALCIUM 20 MG/1
20 TABLET, FILM COATED ORAL DAILY
Qty: 90 TABLET | Refills: 0 | Status: SHIPPED | OUTPATIENT
Start: 2020-12-01

## 2020-12-01 RX ORDER — LANCETS
EACH MISCELLANEOUS
Qty: 100 EACH | Refills: 3 | Status: SHIPPED | OUTPATIENT
Start: 2020-12-01

## 2020-12-01 RX ORDER — BLOOD SUGAR DIAGNOSTIC
STRIP MISCELLANEOUS
Qty: 100 EACH | Refills: 3 | Status: SHIPPED | OUTPATIENT
Start: 2020-12-01

## 2020-12-01 RX ORDER — BLOOD-GLUCOSE METER
EACH MISCELLANEOUS
Qty: 1 KIT | Refills: 0 | Status: SHIPPED | OUTPATIENT
Start: 2020-12-01

## 2020-12-01 RX ORDER — LISINOPRIL 20 MG/1
20 TABLET ORAL DAILY
Qty: 90 TABLET | Refills: 0 | Status: SHIPPED | OUTPATIENT
Start: 2020-12-01

## 2020-12-04 ENCOUNTER — TELEPHONE (OUTPATIENT)
Dept: BARIATRICS | Facility: CLINIC | Age: 55
End: 2020-12-04

## 2020-12-28 ENCOUNTER — HOSPITAL ENCOUNTER (EMERGENCY)
Facility: HOSPITAL | Age: 55
Discharge: HOME | End: 2020-12-28
Attending: EMERGENCY MEDICINE | Admitting: EMERGENCY MEDICINE
Payer: MEDICARE

## 2020-12-28 VITALS
OXYGEN SATURATION: 99 % | RESPIRATION RATE: 18 BRPM | SYSTOLIC BLOOD PRESSURE: 157 MMHG | TEMPERATURE: 98.2 F | BODY MASS INDEX: 38.25 KG/M2 | DIASTOLIC BLOOD PRESSURE: 80 MMHG | WEIGHT: 238 LBS | HEIGHT: 66 IN | HEART RATE: 50 BPM

## 2020-12-28 DIAGNOSIS — M54.12 CERVICAL RADICULOPATHY: Primary | ICD-10-CM

## 2020-12-28 PROCEDURE — 99284 EMERGENCY DEPT VISIT MOD MDM: CPT | Performed by: EMERGENCY MEDICINE

## 2020-12-28 PROCEDURE — 99283 EMERGENCY DEPT VISIT LOW MDM: CPT

## 2020-12-28 RX ORDER — CYCLOBENZAPRINE HCL 5 MG
5 TABLET ORAL 2 TIMES DAILY PRN
Qty: 8 TABLET | Refills: 0 | OUTPATIENT
Start: 2020-12-28 | End: 2021-09-05

## 2020-12-28 RX ORDER — PREDNISONE 20 MG/1
40 TABLET ORAL ONCE
Status: COMPLETED | OUTPATIENT
Start: 2020-12-28 | End: 2020-12-28

## 2020-12-28 RX ORDER — PREDNISONE 20 MG/1
40 TABLET ORAL DAILY
Qty: 8 TABLET | Refills: 0 | Status: SHIPPED | OUTPATIENT
Start: 2020-12-28 | End: 2021-01-01

## 2020-12-28 RX ADMIN — PREDNISONE 40 MG: 20 TABLET ORAL at 20:48

## 2021-01-29 ENCOUNTER — OFFICE VISIT (OUTPATIENT)
Dept: FAMILY MEDICINE CLINIC | Facility: CLINIC | Age: 56
End: 2021-01-29
Payer: MEDICARE

## 2021-01-29 VITALS
HEIGHT: 66 IN | WEIGHT: 239.8 LBS | OXYGEN SATURATION: 97 % | DIASTOLIC BLOOD PRESSURE: 90 MMHG | HEART RATE: 68 BPM | BODY MASS INDEX: 38.54 KG/M2 | TEMPERATURE: 94.6 F | SYSTOLIC BLOOD PRESSURE: 120 MMHG

## 2021-01-29 DIAGNOSIS — E11.9 TYPE 2 DIABETES MELLITUS WITHOUT COMPLICATION, WITHOUT LONG-TERM CURRENT USE OF INSULIN (HCC): ICD-10-CM

## 2021-01-29 DIAGNOSIS — M25.511 RIGHT SHOULDER PAIN, UNSPECIFIED CHRONICITY: Primary | ICD-10-CM

## 2021-01-29 DIAGNOSIS — Z13.31 DEPRESSION SCREENING NEGATIVE: ICD-10-CM

## 2021-01-29 PROCEDURE — 99214 OFFICE O/P EST MOD 30 MIN: CPT | Performed by: PHYSICIAN ASSISTANT

## 2021-01-29 RX ORDER — METHOCARBAMOL 750 MG/1
750 TABLET, FILM COATED ORAL 4 TIMES DAILY PRN
COMMUNITY
Start: 2021-01-28 | End: 2021-09-05

## 2021-01-29 NOTE — PROGRESS NOTES
Assessment/Plan:    Problem List Items Addressed This Visit        Endocrine    Type 2 diabetes mellitus without complication, without long-term current use of insulin (HCC)    Relevant Medications    sitaGLIPtin (JANUVIA) 50 mg tablet      Other Visit Diagnoses     Right shoulder pain, unspecified chronicity    -  Primary    Relevant Orders    Ambulatory referral to Physical Therapy    Ambulatory referral to Orthopedic Surgery    Depression screening negative        BMI 38 0-38 9,adult               Diagnoses and all orders for this visit:    Right shoulder pain, unspecified chronicity  -     Ambulatory referral to Physical Therapy; Future  -     Ambulatory referral to Orthopedic Surgery; Future    Type 2 diabetes mellitus without complication, without long-term current use of insulin (HCC)  -     sitaGLIPtin (JANUVIA) 50 mg tablet; Take 1 tablet (50 mg total) by mouth daily    Depression screening negative    BMI 38 0-38 9,adult    Other orders  -     methocarbamol (ROBAXIN) 750 mg tablet; Take 750 mg by mouth 4 (four) times a day as needed        For R shoulder pain, will refer to physical therapy as well as orthopedics  For diabetes, will start Januvia 50 mg daily  I also gave him the phone number for weight management, he needs to call them and reschedule an appointment with them, I told him explicitly the key to "reversing" his DM (which is his goal) is to lose weight! Subjective:      Patient ID: Jyoti Berry is a 48 y o  male  Carson De Paz is here today to follow up from ER visit yesterday  Went to the ER complaining of R shoulder pain that radiates down his R arm and up into his neck  Pain has been present x few weeks, denies specific injury  Xray R shoulder revealed minimal spur formation, Carson De Paz is here as he says ER directed him to see PCP for MRI of shoulder  Carson De Paz has not yet had any PT for R shoulder, has not seen ortho for shoulder    Also, Carson De Paz apparently stopped taking metformin as it caused him GI distress, he did not tell me that he was stopping the medication until today  He no-showed 2 appointments with me in December  He's been non-compliant with weight management as well  He has no lost any weight since last OV with me, yet states he's changed diet, started taking a vitamin and is thinking about taking a cinnamon capsule daily  The following portions of the patient's history were reviewed and updated as appropriate:   He has a past medical history of Acid reflux, GERD (gastroesophageal reflux disease), TRES (obstructive sleep apnea), Panic disorder, and SVT (supraventricular tachycardia) ([facility] [address] )  ,  does not have any pertinent problems on file  ,   has a past surgical history that includes Cyst Removal; AV node ablation; and pr lap,cholecystectomy (N/A, 9/24/2019)  ,  family history includes Diabetes in his mother and sister; Heart disease in his mother; Hypertension in his mother and sister; Kidney disease in his mother; Liver cancer in his mother; Stroke in his maternal uncle; Thyroid disease in his father and mother  ,   reports that he quit smoking about 7 years ago  He has never used smokeless tobacco  He reports previous alcohol use  He reports that he does not use drugs  ,  is allergic to metformin     Current Outpatient Medications   Medication Sig Dispense Refill    Accu-Chek FastClix Lancets MISC Test blood sugar once daily for fluctuating blood sugars 100 each 3    aspirin 81 MG tablet Take 1 tablet by mouth daily      atorvastatin (LIPITOR) 20 mg tablet Take 1 tablet (20 mg total) by mouth daily 90 tablet 0    Blood Glucose Monitoring Suppl (Accu-Chek Joya Plus) w/Device KIT Test blood sugar once daily for fluctuating blood sugars 1 kit 0    cyclobenzaprine (FLEXERIL) 5 mg tablet Take 1 tablet (5 mg total) by mouth 2 (two) times a day as needed for muscle spasms for up to 8 doses No driving or operating heavy machinery on this medication 8 tablet 0    glucose blood (Accu-Chek Joya Plus) test strip Test blood sugar once daily for fluctuating blood sugars 100 each 3    lisinopril (ZESTRIL) 20 mg tablet Take 1 tablet (20 mg total) by mouth daily 90 tablet 0    methocarbamol (ROBAXIN) 750 mg tablet Take 750 mg by mouth 4 (four) times a day as needed      omeprazole (PriLOSEC OTC) 20 MG tablet Take 1 tablet (20 mg total) by mouth daily 30 tablet 2    esomeprazole (NexIUM) 20 mg capsule Take 20 mg by mouth every morning before breakfast      sitaGLIPtin (JANUVIA) 50 mg tablet Take 1 tablet (50 mg total) by mouth daily 90 tablet 0     No current facility-administered medications for this visit  Review of Systems   Constitutional: Negative for activity change, appetite change, chills, diaphoresis, fatigue, fever and unexpected weight change  HENT: Negative for congestion, ear pain, postnasal drip, rhinorrhea, sinus pressure, sinus pain, sneezing, sore throat, tinnitus and voice change  Eyes: Negative for pain, redness and visual disturbance  Respiratory: Negative for cough, chest tightness, shortness of breath and wheezing  Cardiovascular: Negative for chest pain, palpitations and leg swelling  Gastrointestinal: Negative for abdominal pain, blood in stool, constipation, diarrhea, nausea and vomiting  Genitourinary: Negative for difficulty urinating, dysuria, frequency, hematuria and urgency  Musculoskeletal: Positive for arthralgias (R shoulder)  Negative for back pain, gait problem, joint swelling, myalgias, neck pain and neck stiffness  Skin: Negative for color change, pallor, rash and wound  Neurological: Negative for dizziness, tremors, weakness, light-headedness and headaches  Psychiatric/Behavioral: Negative for dysphoric mood, self-injury, sleep disturbance and suicidal ideas  The patient is not nervous/anxious            Objective:  Vitals:    01/29/21 0908   BP: 120/90   Pulse: 68   Temp: (!) 94 6 °F (34 8 °C)   SpO2: 97%   Weight: 109 kg (239 lb 12 8 oz)   Height: 5' 6" (1 676 m)     Body mass index is 38 7 kg/m²  Physical Exam  Vitals signs reviewed  Constitutional:       General: He is not in acute distress  Appearance: He is well-developed  He is not diaphoretic  HENT:      Head: Normocephalic and atraumatic  Right Ear: Hearing, tympanic membrane, ear canal and external ear normal       Left Ear: Hearing, tympanic membrane, ear canal and external ear normal       Mouth/Throat:      Pharynx: Uvula midline  No oropharyngeal exudate  Eyes:      General: No scleral icterus  Right eye: No discharge  Left eye: No discharge  Conjunctiva/sclera: Conjunctivae normal    Neck:      Musculoskeletal: Neck supple  Thyroid: No thyromegaly  Vascular: No carotid bruit  Cardiovascular:      Rate and Rhythm: Normal rate and regular rhythm  Heart sounds: Normal heart sounds  No murmur  Pulmonary:      Effort: Pulmonary effort is normal  No respiratory distress  Breath sounds: Normal breath sounds  No wheezing  Abdominal:      General: Bowel sounds are normal  There is no distension  Palpations: Abdomen is soft  There is no mass  Tenderness: There is no abdominal tenderness  There is no guarding or rebound  Musculoskeletal: Normal range of motion  Right shoulder: He exhibits tenderness and bony tenderness  He exhibits normal range of motion  Lymphadenopathy:      Cervical: No cervical adenopathy  Skin:     General: Skin is warm and dry  Findings: No erythema or rash  Neurological:      Mental Status: He is alert and oriented to person, place, and time  Psychiatric:         Behavior: Behavior normal          Thought Content:  Thought content normal          Judgment: Judgment normal          PHQ-9 Depression Screening    PHQ-9:   Frequency of the following problems over the past two weeks:      Little interest or pleasure in doing things: 0 - not at all  Feeling down, depressed, or hopeless: 0 - not at all  PHQ-2 Score: 0         BMI Counseling: Body mass index is 38 7 kg/m²  The BMI is above normal  Nutrition recommendations include reducing portion sizes, decreasing overall calorie intake and 3-5 servings of fruits/vegetables daily  Exercise recommendations include exercising 3-5 times per week

## 2021-02-10 ENCOUNTER — OFFICE VISIT (OUTPATIENT)
Dept: BARIATRICS | Facility: CLINIC | Age: 56
End: 2021-02-10
Payer: MEDICARE

## 2021-02-10 VITALS
HEIGHT: 67 IN | SYSTOLIC BLOOD PRESSURE: 136 MMHG | BODY MASS INDEX: 36.79 KG/M2 | WEIGHT: 234.4 LBS | RESPIRATION RATE: 16 BRPM | HEART RATE: 66 BPM | TEMPERATURE: 98.3 F | DIASTOLIC BLOOD PRESSURE: 70 MMHG

## 2021-02-10 DIAGNOSIS — E66.01 SEVERE OBESITY (BMI 35.0-35.9 WITH COMORBIDITY) (HCC): Primary | ICD-10-CM

## 2021-02-10 DIAGNOSIS — E11.9 TYPE 2 DIABETES MELLITUS WITHOUT COMPLICATION, WITHOUT LONG-TERM CURRENT USE OF INSULIN (HCC): ICD-10-CM

## 2021-02-10 PROCEDURE — 99214 OFFICE O/P EST MOD 30 MIN: CPT | Performed by: PHYSICIAN ASSISTANT

## 2021-02-10 RX ORDER — METFORMIN HYDROCHLORIDE 750 MG/1
TABLET, EXTENDED RELEASE ORAL
Qty: 60 TABLET | Refills: 1 | Status: SHIPPED | OUTPATIENT
Start: 2021-02-10

## 2021-02-10 NOTE — PATIENT INSTRUCTIONS
Goals: Food log (ie ) www myfitnesspal com,sparkpeople  com,loseit com,calorieking  com,etc    No sugary beverages  At least 64oz of water daily  Increase physical activity by 10 minutes daily   Gradually increase physical activity to a goal of 5 days per week for 30 minutes of MODERATE intensity PLUS 2 days per week of FULL BODY resistance training  Monitor fasting blood sugar daily - notify office if consistently less than 80 or greater than 150

## 2021-02-10 NOTE — PROGRESS NOTES
Assessment/Plan:    Type 2 diabetes mellitus without complication, without long-term current use of insulin ([facility])    Lab Results   Component Value Date    HGBA1C 7 5 (H) 11/18/2020   -newly diagnosed, started on Januvia (weight neutral) but has not taken  Previously intolerant to Metformin due to GI side effects  -Suggest Metformin XR to see if the will tolerate better  Staff message sent to PCP  Advise checking FSBS daily  Advised on interval eating  Has decided to join [employer] so will receive more diabetic education in regard to nutrition    Severe obesity (BMI 35 0-35 9 with comorbidity) ([facility] )  -Patient is pursuing VLCD - stopped after a few days as he stated he did not feel well  -Initial weight loss goal of 5-10% weight loss for improved health  -Would like to join [employer]   -discussed use of Metformin to help with weight loss, cravings and improve glycemic control  -reviewed importance of interval eating/dietary modification/lifestyle changes for long term success    Initial: 238 lbs  Current: 234 4 lns  Change: -3 6 lbs  Goal: 150 lbs    Goals:    Food log (ie ) www myfitnesspal com,sparkpeople  com,loseit com,calorieking  com,etc    No sugary beverages  At least 64oz of water daily  Increase physical activity by 10 minutes daily  Gradually increase physical activity to a goal of 5 days per week for 30 minutes of MODERATE intensity PLUS 2 days per week of FULL BODY resistance training  Monitor fasting blood sugar daily - notify office if consistently less than 80 or greater than 150    Follow up in approximately 2 weeks with Non-Surgical Dietician and 8 weeks with PA-C     Diagnoses and all orders for this visit:    Severe obesity (BMI 35 0-35 9 with comorbidity) ([facility] )    Type 2 diabetes mellitus without complication, without long-term current use of insulin (HCC)  -     metFORMIN (GLUCOPHAGE-XR) 750 mg 24 hr tablet;  Take 1 tablet in AM for x 2 weeks, if tolerating increase to 2 tablets daily in AM          Subjective:   Chief Complaint   Patient presents with    Follow-up     6 week MWM follow up         Patient ID: Samaria Jesus  is a 48 y o  male with excess weight/obesity here to pursue weight managment  Patient is pursuing [employer] [facility]      HPI Patient presents for MWM follow up  Attempted VLCD  Reports he did not feel well on VLCD he thinks due to low BS but never actually checked his BS  Reports he is not taking any medication for his diabetes because he feels it will harm his liver and kidneys  Reviewed medical programs as he is not interested in bariatric surgery  Reports struggling with cravings in evening/early AM    Struggles with sleep and gets up often so he eats early morning and watches TV  The following portions of the patient's history were reviewed and updated as appropriate: allergies, current medications, past family history, past medical history, past social history, past surgical history and problem list     Review of Systems   Respiratory: Negative  Cardiovascular: Negative  Gastrointestinal: Negative  Musculoskeletal: Positive for arthralgias  Objective:    /70 (BP Location: Left arm, Patient Position: Sitting, Cuff Size: Adult)   Pulse 66   Temp 98 3 °F (36 8 °C) (Tympanic)   Resp 16   Ht 5' 6 5" (1 689 m)   Wt 106 kg (234 lb 6 4 oz)   BMI 37 27 kg/m²      Physical Exam  Vitals signs and nursing note reviewed  Constitutional:       General: He is not in acute distress  Appearance: He is not toxic-appearing  Eyes:      General: No scleral icterus  Pulmonary:      Effort: Pulmonary effort is normal  No respiratory distress  Neurological:      General: No focal deficit present  Mental Status: He is alert and oriented to person, place, and time  Psychiatric:         Mood and Affect: Mood normal          Thought Content:  Thought content normal

## 2021-02-10 NOTE — ASSESSMENT & PLAN NOTE
Lab Results   Component Value Date    HGBA1C 7 5 (H) 11/18/2020   -newly diagnosed, started on Januvia (weight neutral) but has not taken  Previously intolerant to Metformin due to GI side effects  -Suggest Metformin XR to see if the will tolerate better  Staff message sent to PCP  Advise checking FSBS daily  Advised on interval eating   Has decided to join [facility] so will receive more diabetic education in regard to nutrition

## 2021-02-10 NOTE — ASSESSMENT & PLAN NOTE
-Patient is pursuing VLCD - stopped after a few days as he stated he did not feel well    -Initial weight loss goal of 5-10% weight loss for improved health  -Would like to join [employer]   -discussed use of Metformin to help with weight loss, cravings and improve glycemic control  -reviewed importance of interval eating/dietary modification/lifestyle changes for long term success    Initial: 238 lbs  Current: 234 4 lns  Change: -3 6 lbs  Goal: 150 lbs

## 2021-02-16 ENCOUNTER — OFFICE VISIT (OUTPATIENT)
Dept: OBGYN CLINIC | Facility: CLINIC | Age: 56
End: 2021-02-16
Payer: MEDICARE

## 2021-02-16 ENCOUNTER — TELEPHONE (OUTPATIENT)
Dept: FAMILY MEDICINE CLINIC | Facility: CLINIC | Age: 56
End: 2021-02-16

## 2021-02-16 ENCOUNTER — APPOINTMENT (OUTPATIENT)
Dept: RADIOLOGY | Facility: CLINIC | Age: 56
End: 2021-02-16
Payer: MEDICARE

## 2021-02-16 VITALS — HEIGHT: 67 IN | WEIGHT: 234 LBS | BODY MASS INDEX: 36.73 KG/M2

## 2021-02-16 DIAGNOSIS — M75.81 ROTATOR CUFF TENDONITIS, RIGHT: Primary | ICD-10-CM

## 2021-02-16 DIAGNOSIS — M25.511 RIGHT SHOULDER PAIN, UNSPECIFIED CHRONICITY: ICD-10-CM

## 2021-02-16 PROCEDURE — 73030 X-RAY EXAM OF SHOULDER: CPT

## 2021-02-16 PROCEDURE — 99203 OFFICE O/P NEW LOW 30 MIN: CPT | Performed by: ORTHOPAEDIC SURGERY

## 2021-02-16 NOTE — TELEPHONE ENCOUNTER
Please call Jose Antonio Brenner  He was not taking Januvia that I prescribed, but now per ortho note, he is not taking the metformin XR that weight management prescribed, either  What exactly IS his plan for his diabetes and why is he not taking the medications his medical providers are prescribing to him?                           ----- Message from Juany Martinez PA-C sent at 2/10/2021  1:57 PM EST -----  Regarding: DM  Gianfranco Maddox saw Jose Antonio Brenner today for follow up  He is not taking the Januvia as you prescribed  He reports he did not tolerate the metformin that was previously prescribed  He is willing to try the XR version to see if he will tolerate better so I did prescribe this for him  I just wanted to make you aware of med recommendation as I think it will help more with weight loss/cravings for him  Please let me know of any questions or concerns   THank you    Holli Farley PA-C

## 2021-02-16 NOTE — PROGRESS NOTES
Assessment:     1  Rotator cuff tendonitis, right          Plan:     Problem List Items Addressed This Visit        Musculoskeletal and Integument    Rotator cuff tendonitis, right - Primary    Relevant Orders    XR shoulder 2+ vw right    Ambulatory referral to PT/OT hand therapy            81-year-old male with right shoulder rotator cuff tendinitis  I reviewed the radiographs and treatment options with him  He has excellent strength I see no signs of significant tear today  I have strongly recommended physical therapy which he was happy to do  I also discussed possible subacromial injection but he wished to wait on that at this time  I will be happy to see him back in the future if he fails to have significant improvement with therapy for re-evaluation and possible injection  He shows good understanding of the plan  He has no restrictions   Follow-up as needed    Patient ID: Susan Page is a 48 y o  male  Chief Complaint:  Right shoulder pain    HPI:  81-year-old male here today for pain in his right shoulder  About two months ago he started noticing pain in the shoulder  It has radiated up into the neck and down distally at times but it is primarily a sharp pain that is localized to the shoulder region  He had no specific injury  He is right-hand dominant and 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  If he lays on that side it is worse  He sometimes has trouble with abducting the arm especially with weight  He has been taking Excedrin for it  He has not done any therapy or had any injections  He denies any numbness but has occasional tingling  He has also taken a muscle relaxant  Nighttime causes him the most discomfort  It has gotten slightly worse in the last months to the point where he had to go into the emergency room on two different occasions because the pain was so severe  He is looking for relief        Allergy:  Allergies   Allergen Reactions    Metformin GI Intolerance Medications:  all current active meds have been reviewed    Past Medical History:  Past Medical History:   Diagnosis Date    Acid reflux     GERD (gastroesophageal reflux disease)     TRES (obstructive sleep apnea)     Panic disorder     SVT (supraventricular tachycardia) (HCC)        Past Surgical History:  Past Surgical History:   Procedure Laterality Date    AV NODE ABLATION      CYST REMOVAL      NH LAP,CHOLECYSTECTOMY N/A 2019    Procedure: CHOLECYSTECTOMY LAPAROSCOPIC;  Surgeon: Jose Real DO;  Location: [facility];  Service: General       Family History:  Family History   Problem Relation Age of Onset    Diabetes Mother     Kidney disease Mother     Liver cancer Mother     Heart disease Mother     Hypertension Mother     Thyroid disease Mother     Diabetes Sister     Hypertension Sister     Thyroid disease Father     Stroke Maternal Uncle        Social History:  Social History     Substance and Sexual Activity   Alcohol Use Not Currently    Comment: social     Social History     Substance and Sexual Activity   Drug Use Never     Social History     Tobacco Use   Smoking Status Former Smoker    Quit date: 2013    Years since quittin 2   Smokeless Tobacco Never Used           ROS:  Review of Systems   Constitutional: Negative  Negative for chills and fever  HENT: Negative  Negative for ear pain and sore throat  Eyes: Negative  Negative for pain and visual disturbance  Respiratory: Negative  Negative for cough and shortness of breath  Cardiovascular: Negative  Negative for chest pain and palpitations  Gastrointestinal: Negative  Negative for abdominal pain and vomiting  Endocrine: Negative  Genitourinary: Negative  Negative for dysuria and hematuria  Musculoskeletal: Positive for arthralgias  Negative for back pain  Skin: Negative  Negative for color change and rash  Allergic/Immunologic: Negative  Neurological: Negative    Negative for seizures and syncope  Hematological: Negative  Psychiatric/Behavioral: Negative  All other systems reviewed and are negative  Objective:  BP Readings from Last 1 Encounters:   02/10/21 136/70      Wt Readings from Last 1 Encounters:   02/16/21 106 kg (234 lb)        BMI:   Estimated body mass index is 37 2 kg/m² as calculated from the following:    Height as of this encounter: 5' 6 5" (1 689 m)  Weight as of this encounter: 106 kg (234 lb)  EXAM:   Physical Exam  Constitutional:       General: He is not in acute distress  Appearance: He is well-developed  He is not diaphoretic  HENT:      Head: Normocephalic and atraumatic  Eyes:      General:         Right eye: No discharge  Left eye: No discharge  Neck:      Musculoskeletal: Normal range of motion and neck supple  Trachea: No tracheal deviation  Cardiovascular:      Rate and Rhythm: Normal rate and regular rhythm  Pulmonary:      Effort: Pulmonary effort is normal  No respiratory distress  Abdominal:      General: There is no distension  Palpations: Abdomen is soft  Tenderness: There is no abdominal tenderness  Skin:     General: Skin is warm  Findings: No erythema  Neurological:      Mental Status: He is alert and oriented to person, place, and time  Psychiatric:         Behavior: Behavior normal        Right Shoulder Exam     Tenderness   The patient is experiencing no tenderness  Range of Motion   External rotation: normal   Forward flexion: 170   Internal rotation 0 degrees: normal     Muscle Strength   The patient has normal right shoulder strength  Tests   Apprehension: negative  Beltran test: negative  Cross arm: negative  Impingement: positive  Drop arm: negative    Other   Erythema: absent  Sensation: normal  Pulse: present    Comments:  Negative obreins, neg speeds      Left Shoulder Exam   Left shoulder exam is normal     Tenderness   The patient is experiencing no tenderness  Range of Motion   The patient has normal left shoulder ROM  Muscle Strength   The patient has normal left shoulder strength  Tests   Apprehension: negative  Beltran test: negative  Cross arm: negative  Impingement: negative  Drop arm: negative    Other   Erythema: absent  Sensation: normal  Pulse: present               Radiographs:  I have personally reviewed pertinent films in PACS and my interpretation is   Very mild degenerative changes of the glenohumeral joint with moderate changes of the acromioclavicular joint  Humeral head is well centered in the glenoid  No calcific tendinitis noted

## 2021-02-17 ENCOUNTER — EVALUATION (OUTPATIENT)
Dept: OCCUPATIONAL THERAPY | Facility: CLINIC | Age: 56
End: 2021-02-17
Payer: MEDICARE

## 2021-02-17 DIAGNOSIS — M75.81 ROTATOR CUFF TENDONITIS, RIGHT: Primary | ICD-10-CM

## 2021-02-17 PROCEDURE — 97166 OT EVAL MOD COMPLEX 45 MIN: CPT

## 2021-02-17 PROCEDURE — 97110 THERAPEUTIC EXERCISES: CPT

## 2021-02-17 PROCEDURE — 97014 ELECTRIC STIMULATION THERAPY: CPT

## 2021-02-17 NOTE — PROGRESS NOTES
OT Evaluation     Today's date: 2021  Patient name: Doris Siddiqui  : 1965  MRN: [id number]  Referring provider: Eliel Cordero MD  Dx:   Encounter Diagnosis     ICD-10-CM    1  Rotator cuff tendonitis, right  [facility] 81 Ambulatory referral to PT/OT hand therapy                  Assessment  Assessment details: Patient presenting to OP OT services with a dx of right shoulder tendonitits  Patient reports symptoms occurred three weeks ago in which he went to the ER secondary to pain after sleeping  Patient's last MD appointment was on 2021  Patient reports increased pain when sleeping on it and inconsistent movements resulting in pain  Patient reports decreased functional use and strength of right shoulder  Patient works in construction  Patient reports pain in the back of the shoulder  Patient is RHD  Impairments: abnormal coordination, abnormal or restricted ROM and impaired physical strength  Other impairment: decreased functional use  Barriers to therapy: Past Medical History:  No date: Acid reflux  No date: GERD (gastroesophageal reflux disease)  No date: TRES (obstructive sleep apnea)  No date: Panic disorder  No date: SVT (supraventricular tachycardia) ([facility])  Understanding of Dx/Px/POC: good   Prognosis: good    Goals  STGs    Pt will increase  strength by 5-10#  Pt will increase shoulder strength by 1/2 grade    Pt will report an improvement in sleeping ability by 25%    Independent with HEP      LTGs     Pt will increase  strength by an additional 5-10#  Pt will increase shoulder strength by 1-2 grade  Pt will increase pinch strength by 3-5#  Pt will report an improvement in sleeping ability by 50%    Pt will report an increase in ADL/IADL participation    Pt will report a resolution of pain with special tests      Plan  Plan details: Patient has presenting to OP OT services with a dx of right shoulder pain   Patient demonstrating increased pain, decreased strength, decreased ROM and decreased activity  Pt would benefit from continued Occupational Therapy services two times per week for 4 weeks to return to prior level of function and achieve all established goals   Thank you for the referral!    Patient would benefit from: OT eval and skilled occupational therapy  Referral necessary: Yes  Planned modality interventions: ultrasound, unattended electrical stimulation, thermotherapy: hydrocollator packs, cryotherapy and thermotherapy: paraffin bath  Planned therapy interventions: massage, manual therapy, joint mobilization, patient education, strengthening, stretching, fine motor coordination training, home exercise program, neuromuscular re-education, self care, therapeutic exercise and therapeutic activities  Frequency: 2x week  Duration in visits: 8  Duration in weeks: 4  Treatment plan discussed with: patient        Subjective Evaluation    History of Present Illness  Mechanism of injury: Pain when sleeping on right shoulder          Not a recurrent problem   Quality of life: good    Pain  Current pain ratin  At best pain ratin  At worst pain rating: 10  Location: Right Shoulder    Hand dominance: right      Diagnostic Tests  X-ray: normal  Treatments  Current treatment: occupational therapy  Patient Goals  Patient goals for therapy: decreased edema, decreased pain, increased strength, independence with ADLs/IADLs and increased motion          Objective     Active Range of Motion   Left Shoulder   Normal active range of motion    Right Shoulder   Flexion: 130 degrees   Extension: 80 degrees   Abduction: 145 degrees   Adduction: 0 degrees   External rotation 0°: 45 degrees   External rotation 90°: 75 degrees  Internal rotation 0°: 70 degrees   Internal rotation 90°: 75 degrees     Left Elbow   Normal active range of motion    Right Elbow   Normal active range of motion    Left Wrist   Normal active range of motion    Right Wrist   Normal active range of motion    Left Thumb Opposition: WNL    Right Thumb   Opposition: WNL    Additional Active Range of Motion Details  Patient demonstrating with decreased right shoulder ROM as compared to left  Composite fist noted    Strength/Myotome Testing     Left Shoulder   Normal muscle strength    Right Shoulder     Planes of Motion   Flexion: 3-   Extension: 3+   Abduction: 3-   Adduction: 3+   External rotation at 0°: 3+   Internal rotation at 0°: 3+     Left Elbow   Normal strength    Right Elbow   Flexion: 4  Extension: 4  Forearm supination: 4  Forearm pronation: 4    Left Wrist/Hand   Normal wrist strength     (2nd hand position)     Trial 1: 70    Right Wrist/Hand   Normal wrist strength     (2nd hand position)     Trial 1: 70    Tests     Right Shoulder   Positive Gail's  Negative drop arm, empty can, full can, Hawkin's and Neer's       Additional Tests Details  Patient reports increased pain with Gail's test      Flowsheet Rows      Most Recent Value   PT/OT G-Codes   Current Score  50   Projected Score  70                Precautions R Shoulder Pain       Manuals 02/17/2021                                       Neuro Re-Ed  02/17/2021                                                               Ther Ex 02/17/2021       Sleeper Stretch 20 sec x 3       Shoulder  Shrugs  Rolls  Retraction        Digi-Flex        UBE Machine        TB  Sh Ext  Retraction  IR  ER  FF  Biceps Curl  ABD Red  X 20  X 20  X 20  X 20       Doorway Stretch 30 sec x 3       Lat Stretch w/ Stair Rail        Sidelying  ER  FF                                Ther Activity 02/17/2021                                               Modalities 02/17/2021       ARANZA        CP        E-STIM Shoulder Performed at end of session

## 2021-02-22 ENCOUNTER — OFFICE VISIT (OUTPATIENT)
Dept: OCCUPATIONAL THERAPY | Facility: CLINIC | Age: 56
End: 2021-02-22
Payer: MEDICARE

## 2021-02-22 DIAGNOSIS — M75.81 ROTATOR CUFF TENDONITIS, RIGHT: Primary | ICD-10-CM

## 2021-02-22 PROCEDURE — 97014 ELECTRIC STIMULATION THERAPY: CPT

## 2021-02-22 PROCEDURE — 97110 THERAPEUTIC EXERCISES: CPT

## 2021-02-22 NOTE — PROGRESS NOTES
Daily Note     Today's date: 2021  Patient name: Macy Miramontes  : 1965  MRN: [id number]  Referring provider: Flaco Rodas MD  Dx:   Encounter Diagnosis     ICD-10-CM    1  Rotator cuff tendonitis, right  M[facility] 81                   Subjective: It was bothering me a little bit last night, but I think it was sleeping funny  Objective: See treatment diary below      Assessment: Tolerated treatment well  Patient exhibited good technique with therapeutic exercises and would benefit from continued OT  Pt presents this date stating his pain is increased slightly due to potentially sleeping on it wrong  Pt particiated in skilled OT this date focusing on strength/rom, HEP discussion and preparation, and education on long term HEP participation and involvement  Pt tolerated exercises well this date with no increased pain, yet pt stating he can tell he worked it  Pt states he only really has discomfort after therapy  Plan: Continue per plan of care  Progress treatment as tolerated  Progress treament per protocol           Precautions R Shoulder Pain       Manuals 2021                                      Neuro Re-Ed  2021                                                              Ther Ex 2021      Sleeper Stretch 20 sec x 3 20 sec x 3      Shoulder  Shrugs  Rolls  Retraction        Digi-Flex        UBE Machine  4F/FB min L 60      TB  Sh Ext  Retraction  IR  ER  FF  Biceps Curl  ABD  ADD Red  X 20  X 20  X 20  X 20 Red  X 20  X 20  X 20  X 20  X 20  X 20  X 20  X 20      Doorway Stretch 30 sec x 3 20 sec x 3      Lat Stretch w/ Stair Rail        Sidelying  ER  FF        Pulleys  FF  Abd    3 sec x 10  3 sec x 10                      Ther Activity 2021                                              Modalities 2021              CP        E-STIM Shoulder Performed at end of session Performed at end of session

## 2021-02-24 ENCOUNTER — APPOINTMENT (OUTPATIENT)
Dept: OCCUPATIONAL THERAPY | Facility: CLINIC | Age: 56
End: 2021-02-24
Payer: MEDICARE

## 2021-02-26 ENCOUNTER — OFFICE VISIT (OUTPATIENT)
Dept: OCCUPATIONAL THERAPY | Facility: CLINIC | Age: 56
End: 2021-02-26
Payer: MEDICARE

## 2021-02-26 DIAGNOSIS — M75.81 ROTATOR CUFF TENDONITIS, RIGHT: Primary | ICD-10-CM

## 2021-02-26 PROCEDURE — 97014 ELECTRIC STIMULATION THERAPY: CPT

## 2021-02-26 PROCEDURE — 97110 THERAPEUTIC EXERCISES: CPT

## 2021-02-26 NOTE — PROGRESS NOTES
Daily Note     Today's date: 2021  Patient name: Brien Lui  : 1965  MRN: [id number]  Referring provider: Petra Goodwin MD  Dx:   Encounter Diagnosis     ICD-10-CM    1  Rotator cuff tendonitis, right  M75 81                   Subjective: "I will try these at home "      Objective: See treatment diary below      Assessment: Tolerated treatment well  Patient exhibited good technique with therapeutic exercises and would benefit from continued OT  Patient and therapist reviewed home exercises with verbal understanding  Patient tolerated increase in theraband resistance this date without issues noted  Plan: Continue per plan of care  Progress treatment as tolerated            Precautions R Shoulder Pain       Manuals 2021                                     Neuro Re-Ed  2021                                                             Ther Ex 2021     Sleeper Stretch 20 sec x 3 20 sec x 3 20 sec x 3     Shoulder  Shrugs  Rolls  Retraction        Digi-Flex        UBE Machine  4F/FB min L 60 4F/4B L 60     TB  Sh Ext  Retraction  IR  ER  FF  Biceps Curl  ABD  ADD  W Red  X 20  X 20  X 20  X 20 Red  X 20  X 20  X 20  X 20  X 20  X 20  X 20  X 20 Green  X 20  X 20  X 20  X 20  X 20  X 20  X 20  X 20     Doorway Stretch 30 sec x 3 20 sec x 3 20 sec x 3     Lat Stretch w/ Stair Rail   20 sec x 3     Sidelying  ER  FF        Pulleys  FF  Abd    3 sec x 10  3 sec x 10   3 sec x 10  3 sec x 10     Thera-Band   Wall Clock        Dumbbell Bent Over Rows                Ther Activity 2021                                             Modalities 2021             CP        E-STIM Shoulder Performed at end of session Performed at end of session Performed at end of session

## 2021-03-01 ENCOUNTER — APPOINTMENT (OUTPATIENT)
Dept: OCCUPATIONAL THERAPY | Facility: CLINIC | Age: 56
End: 2021-03-01
Payer: MEDICARE

## 2021-03-03 ENCOUNTER — APPOINTMENT (OUTPATIENT)
Dept: OCCUPATIONAL THERAPY | Facility: CLINIC | Age: 56
End: 2021-03-03
Payer: MEDICARE

## 2021-03-03 ENCOUNTER — OFFICE VISIT (OUTPATIENT)
Dept: OCCUPATIONAL THERAPY | Facility: CLINIC | Age: 56
End: 2021-03-03
Payer: MEDICARE

## 2021-03-03 DIAGNOSIS — M75.81 ROTATOR CUFF TENDONITIS, RIGHT: Primary | ICD-10-CM

## 2021-03-03 PROCEDURE — 97014 ELECTRIC STIMULATION THERAPY: CPT

## 2021-03-03 PROCEDURE — 97110 THERAPEUTIC EXERCISES: CPT

## 2021-03-03 NOTE — PROGRESS NOTES
Daily Note     Today's date: 3/3/2021  Patient name: Susan Fall  : 1965  MRN: [id number]  Referring provider: Carolina De La Torre MD  Dx:   Encounter Diagnosis     ICD-10-CM    1  Rotator cuff tendonitis, right  M75 81                   Subjective: "That was a good workout "      Objective: See treatment diary below      Assessment: Tolerated treatment well  Patient exhibited good technique with therapeutic exercises and would benefit from continued OT  Patient trialed additional free weight exercises this session to improve UE strength and functional use  Patient tolerated well without an increase in pain noted  Patient is unable to attend therapy next week secondary to going to [place] to see his Uncle  Patient will re-start services on the   Plan: Continue per plan of care  Progress treatment as tolerated            Precautions R Shoulder Pain       Manuals 2021                                    Neuro Re-Ed  2021                                                            Ther Ex 2021    Sleeper Stretch 20 sec x 3 20 sec x 3 20 sec x 3 20 sec x 3    Shoulder  Shrugs  Retraction      3# x 20  3# x 20    Digi-Flex        UBE Machine  4F/FB min L 60 4F/4B L 60 5F/5B L 55    TB  Sh Ext  Retraction  IR  ER  FF  Biceps Curl  ABD  ADD  W  Triceps Ext  Diagonal Flexion Red  X 20  X 20  X 20  X 20 Red  X 20  X 20  X 20  X 20  X 20  X 20  X 20  X 20 Green  X 20  X 20  X 20  X 20  X 20  X 20  X 20  X 20 Green  X 20  X 20  X 20  X 20   X 20  X 20  X 20  X 20  X 20  X 20  X 20    Doorway Stretch 30 sec x 3 20 sec x 3 20 sec x 3 20 sec x 3    Lat Stretch w/ Stair Rail   20 sec x 3 20 sec x 3    Sidelying  ER  FF      2# x 20  1# x 20    Pulleys  FF  Abd    3 sec x 10  3 sec x 10   3 sec x 10  3 sec x 10   3 sec x 10  3 sec x 10    Thera-Band   Wall Clock        Dumbbell Bent Over Rows    4# x 20 Ther Activity 02/17/2021 2/22/2021 02/26/2021 03/03/2021                                            Modalities 02/17/2021 2/22/2021 02/26/2021 03/03/2021            CP        E-STIM Shoulder Performed at end of session Performed at end of session Performed at end of session Performed at end of session

## 2021-03-05 ENCOUNTER — OFFICE VISIT (OUTPATIENT)
Dept: OCCUPATIONAL THERAPY | Facility: CLINIC | Age: 56
End: 2021-03-05
Payer: MEDICARE

## 2021-03-05 DIAGNOSIS — M75.81 ROTATOR CUFF TENDONITIS, RIGHT: Primary | ICD-10-CM

## 2021-03-05 PROCEDURE — 97014 ELECTRIC STIMULATION THERAPY: CPT

## 2021-03-05 PROCEDURE — 97110 THERAPEUTIC EXERCISES: CPT

## 2021-03-05 NOTE — PROGRESS NOTES
Daily Note     Today's date: 3/5/2021  Patient name: Gina Alicia  : 1965  MRN: [id number]  Referring provider: Nidia Raymond MD  Dx:   Encounter Diagnosis     ICD-10-CM    1  Rotator cuff tendonitis, right  M75 81                   Subjective: It's feeling better  Objective: See treatment diary below      Assessment: Tolerated treatment well  Patient demonstrated fatigue post treatment, exhibited good technique with therapeutic exercises and would benefit from continued OT  Pain pre and post tx 0  Increase soreness noted during tx  Pt required cues for proper technique  Plan: Continue per plan of care  Progress treatment as tolerated            Precautions R Shoulder Pain       Manuals 2021 2021 2021 2021 3/5/2021                                   Neuro Re-Ed  2021 2021 2021 2021 3/5/2021                                                           Ther Ex 2021 2021 2021 2021 3/5/2021   Sleeper Stretch 20 sec x 3 20 sec x 3 20 sec x 3 20 sec x 3 20 sec x 3   Shoulder  Shrugs  Retraction      3# x 20  3# x 20   3# x 20  3# x 20   Digi-Flex        UBE Machine  4F/FB min L 60 4F/4B L 60 5F/5B L 55 5F/5B 10 min 55   TB  Sh Ext  Retraction  IR  ER  FF  Biceps Curl  ABD  ADD  W  Triceps Ext  Diagonal Flexion Red  X 20  X 20  X 20  X 20 Red  X 20  X 20  X 20  X 20  X 20  X 20  X 20  X 20 Green  X 20  X 20  X 20  X 20  X 20  X 20  X 20  X 20 Green  X 20  X 20  X 20  X 20   X 20  X 20  X 20  X 20  X 20  X 20  X 20 Green  X 20  X 20  X 20  X 20  X 20  X 20  X 20  X 20  X 20  X 20  X 20   Doorway Stretch  Low  high 30 sec x 3 20 sec x 3 20 sec x 3 20 sec x 3     20 sec x 3  20 sec x 3   Lat Stretch w/ Stair Rail   20 sec x 3 20 sec x 3 20 sec x3   Sidelying  ER  FF      2# x 20  1# x 20   2# x 20  1# x 20   Pulleys  FF  Abd    3 sec x 10  3 sec x 10   3 sec x 10  3 sec x 10   3 sec x 10  3 sec x 10   3 sec x 10  3 sec x 10   Thera-Band   Wall Clock        Dumbbell Bent Over Rows    4# x 20 4# x 20           Ther Activity 02/17/2021 2/22/2021 02/26/2021 03/03/2021 3/5/2021                                           Modalities 02/17/2021 2/22/2021 02/26/2021 03/03/2021 3/5/2021           CP        E-STIM Shoulder Performed at end of session Performed at end of session Performed at end of session Performed at end of session Performed at end of session

## 2021-03-08 ENCOUNTER — APPOINTMENT (OUTPATIENT)
Dept: OCCUPATIONAL THERAPY | Facility: CLINIC | Age: 56
End: 2021-03-08
Payer: MEDICARE

## 2021-03-10 ENCOUNTER — APPOINTMENT (OUTPATIENT)
Dept: OCCUPATIONAL THERAPY | Facility: CLINIC | Age: 56
End: 2021-03-10
Payer: MEDICARE

## 2021-03-15 ENCOUNTER — APPOINTMENT (OUTPATIENT)
Dept: OCCUPATIONAL THERAPY | Facility: CLINIC | Age: 56
End: 2021-03-15
Payer: MEDICARE

## 2021-03-16 ENCOUNTER — APPOINTMENT (OUTPATIENT)
Dept: OCCUPATIONAL THERAPY | Facility: CLINIC | Age: 56
End: 2021-03-16
Payer: MEDICARE

## 2021-03-17 ENCOUNTER — EVALUATION (OUTPATIENT)
Dept: OCCUPATIONAL THERAPY | Facility: CLINIC | Age: 56
End: 2021-03-17
Payer: MEDICARE

## 2021-03-17 DIAGNOSIS — M75.81 ROTATOR CUFF TENDONITIS, RIGHT: Primary | ICD-10-CM

## 2021-03-17 PROCEDURE — 97168 OT RE-EVAL EST PLAN CARE: CPT

## 2021-03-17 PROCEDURE — 97110 THERAPEUTIC EXERCISES: CPT

## 2021-03-17 NOTE — PROGRESS NOTES
Daily Note     Today's date: 3/17/2021  Patient name: Jasmin Martinez  : 1965  MRN: [id number]  Referring provider: Charleen Cline MD  Dx:   Encounter Diagnosis     ICD-10-CM    1  Rotator cuff tendonitis, right  M75 81                   Subjective: It feels a little better      Objective: See treatment diary below      Assessment: Tolerated treatment well  Patient demonstrated fatigue post treatment, exhibited good technique with therapeutic exercises and would benefit from continued OT  Reassessment completed by OTR refer to same for details  Denied pain  Plan: Continue per plan of care  Progress treatment as tolerated            Precautions R Shoulder Pain       Manuals 2021 2021 2021 2021 3/5/2021                                   Neuro Re-Ed  2021 2021 2021 2021 3/5/2021                                                           Ther Ex 2021 2021 2021 2021 3/5/2021   Sleeper Stretch 20 sec x 3 20 sec x 3 20 sec x 3 20 sec x 3 20 sec x 3   Shoulder  Shrugs  Retraction   4# x 20  4# x 20     3# x 20  3# x 20   3# x 20  3# x 20   Digi-Flex        UBE Machine 10 Min 55 Resistance 4F/FB min L 60 4F/4B L 60 5F/5B L 55 5F/5B 10 min 55   TB  Sh Ext  Retraction  IR  ER  FF  Biceps Curl  ABD  ADD  W  Triceps Ext  Diagonal Flexion Green  X 20  X 20  X 20  X 20  X 20  X 20  X 20  X 20    X 20  X 20  X 20 Red  X 20  X 20  X 20  X 20  X 20  X 20  X 20  X 20 Green  X 20  X 20  X 20  X 20  X 20  X 20  X 20  X 20 Green  X 20  X 20  X 20  X 20   X 20  X 20  X 20  X 20  X 20  X 20  X 20 Green  X 20  X 20  X 20  X 20  X 20  X 20  X 20  X 20  X 20  X 20  X 20   Doorway Stretch  Low  high 30 sec x 3 20 sec x 3 20 sec x 3 20 sec x 3     20 sec x 3  20 sec x 3   Lat Stretch w/ Stair Rail 20 sec x 3  20 sec x 3 20 sec x 3 20 sec x3   Sidelying  ER  FF      2# x 20  1# x 20   2# x 20  1# x 20   Pulleys  FF  Abd   3 sec x 10  3 sec x 10   3 sec x 10  3 sec x 10 3 sec x 10  3 sec x 10   3 sec x 10  3 sec x 10   3 sec x 10  3 sec x 10   Thera-Band   Wall Clock        Dumbbell Bent Over Rows 4# x 20   4# x 20 4# x 20           Ther Activity 03/17/2021 2/22/2021 02/26/2021 03/03/2021 3/5/2021                                           Modalities 03/17/2021 2/22/2021 02/26/2021 03/03/2021 3/5/2021           CP        E-STIM Shoulder Performed at end of session Performed at end of session Performed at end of session Performed at end of session Performed at end of session

## 2021-03-17 NOTE — PROGRESS NOTES
OT Re-Evaluation     Today's date: 3/17/2021  Patient name: Jeremiah Motley  : 1965  MRN: [id number]  Referring provider: Fercho Beckman MD  Dx:   Encounter Diagnosis     ICD-10-CM    1  Rotator cuff tendonitis, right  M75 81        Start Time: 1105  Stop Time: 1155  Total time in clinic (min): 50 minutes    Assessment  Assessment details: Patient presenting to OP OT services with a dx of right shoulder tendonitits  Patient reports symptoms occurred three weeks ago in which he went to the ER secondary to pain after sleeping  Patient's last MD appointment was on 2021  Patient reports increased pain when sleeping on it and inconsistent movements resulting in pain  Patient reports decreased functional use and strength of right shoulder  Patient works in construction  Patient reports pain in the back of the shoulder  Patient is RHD  Re-assessment completed on 2021  Patient has follow-up with Ortho on 2021  Patient has consistently attended OP OT services since start of care  Patient has made steady progress towards established goals  Patient demonstrating with increases in shoulder ROM, strength and functional use and decreases in pain  Patient has not met all established goals and will benefit from continued OT services to maximize level of function       Impairments: abnormal coordination, abnormal or restricted ROM and impaired physical strength  Other impairment: decreased functional use  Barriers to therapy: Past Medical History:  No date: Acid reflux  No date: GERD (gastroesophageal reflux disease)  No date: TRES (obstructive sleep apnea)  No date: Panic disorder  No date: SVT (supraventricular tachycardia) ([facility])  Understanding of Dx/Px/POC: good   Prognosis: good    Goals  STGs    Pt will increase  strength by 5-10#  - Met    Pt will increase shoulder strength by 1/2 grade - Met    Pt will report an improvement in sleeping ability by 25% - Progressing    Independent with HEP - Met      LTGs     Pt will increase  strength by an additional 5-10#  - Progressing    Pt will increase shoulder strength by 1-2 grade  - Progressing    Pt will increase pinch strength by 3-5#  - Progressing    Pt will report an improvement in sleeping ability by 50% - Progressing    Pt will report an increase in ADL/IADL participation - Progressing    Pt will report a resolution of pain with special tests - Not Met      Plan  Plan details: Patient has Consistent attended OP OT services since start of care  Patient has made steady progress towards established goals  Pt has shown improvement start of care, demonstrating decreased pain, increased strength, increased ROM and increased activity  However, pt remains with impairments including remaining ROM and strength deficits, as well as remaining pain  Pt would benefit from continuation of skilled therapy services to further progress POC and address remaining deficits at this time  Thank you!       Patient would benefit from: OT eval and skilled occupational therapy  Referral necessary: Yes  Planned modality interventions: ultrasound, unattended electrical stimulation, thermotherapy: hydrocollator packs, cryotherapy and thermotherapy: paraffin bath  Planned therapy interventions: massage, manual therapy, joint mobilization, patient education, strengthening, stretching, fine motor coordination training, home exercise program, neuromuscular re-education, self care, therapeutic exercise and therapeutic activities  Frequency: 2x week  Duration in visits: 8  Duration in weeks: 4  Treatment plan discussed with: patient        Subjective Evaluation    History of Present Illness  Mechanism of injury: Pain when sleeping on right shoulder          Not a recurrent problem   Quality of life: good    Pain  Current pain ratin  At best pain ratin  At worst pain ratin  Location: Right Shoulder    Hand dominance: right      Diagnostic Tests  X-ray: normal  Treatments  Current treatment: occupational therapy  Patient Goals  Patient goals for therapy: decreased edema, decreased pain, increased strength, independence with ADLs/IADLs and increased motion          Objective     Active Range of Motion   Left Shoulder   Normal active range of motion    Right Shoulder   Flexion: 146 degrees   Extension: 80 degrees   Abduction: 130 degrees   Adduction: 0 degrees   External rotation 0°: 65 degrees   External rotation 90°: 75 degrees  Internal rotation 0°: 70 degrees   Internal rotation 90°: 75 degrees     Left Elbow   Normal active range of motion    Right Elbow   Normal active range of motion    Left Wrist   Normal active range of motion    Right Wrist   Normal active range of motion    Left Thumb   Opposition: WNL    Right Thumb   Opposition: WNL    Additional Active Range of Motion Details  Patient demonstrating with increased right shoulder ROM as compared to [facility]  Composite fist noted    Strength/Myotome Testing     Left Shoulder   Normal muscle strength    Right Shoulder     Planes of Motion   Flexion: 3   Extension: 3+   Abduction: 3   Adduction: 3+   External rotation at 0°: 3+   Internal rotation at 0°: 3+     Left Elbow   Normal strength    Right Elbow   Flexion: 4  Extension: 4  Forearm supination: 4  Forearm pronation: 4    Left Wrist/Hand   Normal wrist strength     (2nd hand position)     Trial 1: 70    Right Wrist/Hand   Normal wrist strength     (2nd hand position)     Trial 1: 65    Tests     Right Shoulder   Positive Gail's  Negative drop arm, empty can, full can, Hawkin's and Neer's       Additional Tests Details  Patient reports increased pain with Gail's test    Patient continues to complain of pain with Gail's      Flowsheet Rows      Most Recent Value   PT/OT G-Codes   Current Score  74   Projected Score  70

## 2021-03-19 ENCOUNTER — OFFICE VISIT (OUTPATIENT)
Dept: OCCUPATIONAL THERAPY | Facility: CLINIC | Age: 56
End: 2021-03-19
Payer: MEDICARE

## 2021-03-19 DIAGNOSIS — M75.81 ROTATOR CUFF TENDONITIS, RIGHT: Primary | ICD-10-CM

## 2021-03-19 PROCEDURE — 97110 THERAPEUTIC EXERCISES: CPT

## 2021-03-19 PROCEDURE — 97140 MANUAL THERAPY 1/> REGIONS: CPT

## 2021-03-19 NOTE — PROGRESS NOTES
Daily Note     Today's date: 3/19/2021  Patient name: Chantell Sanchez  : 1965  MRN: [id number]  Referring provider: Jaquan Ontiveros MD  Dx:   Encounter Diagnosis     ICD-10-CM    1  Rotator cuff tendonitis, right  M75 81                   Subjective: I can't lift my right arm as high as my left        Objective:   Manuals 2021 3/19/2021 2021 2021 3/5/2021    PROM    performed          scap mobs    performed                                     Neuro Re-Ed  2021 3/19/2021 2021 2021 3/5/2021                                                                                                     Ther Ex 2021 3/19/2021 2021 2021 3/5/2021   Sleeper Stretch 20 sec x 3 20 sec x 3 20 sec x 3 20 sec x 3 20 sec x 3   Shoulder  Shrugs  Retraction    4# x 20  4# x 20    5# x 20  5# x 20      3# x 20  3# x 20    3# x 20  3# x 20   Digi-Flex             UBE Machine 10 Min 55 Resistance 10 min L 60 4F/4B L 60 5F/5B L 55 5F/5B 10 min 55   TB  Sh Ext  Retraction  IR  ER  FF  Biceps Curl  ABD  ADD  W  Triceps Ext  Diagonal Flexion Green  X 20  X 20  X 20  X 20  X 20  X 20  X 20  X 20     X 20  X 20  X 20 Green  X 20  X 20  X 20  X 20  X 20  X 20  X 20  X 20  X 20  X 20  X 20  X 20 Green  X 20  X 20  X 20  X 20  X 20  X 20  X 20  X 20 Green  X 20  X 20  X 20  X 20   X 20  X 20  X 20  X 20  X 20  X 20  X 20 Green  X 20  X 20  X 20  X 20  X 20  X 20  X 20  X 20  X 20  X 20  X 20   Doorway Stretch  Low  high 30 sec x 3 20 sec x 3 20 sec x 3 20 sec x 3       20 sec x 3  20 sec x 3   Lat Stretch w/ Stair Rail 20 sec x 3 20 sec x 3 20 sec x 3 20 sec x 3 20 sec x3   Sidelying  ER  FF          2# x 20  1# x 20    2# x 20  1# x 20   Pulleys  FF  Abd    3 sec x 10  3 sec x 10    3 sec x 10  3 sec x 10    3 sec x 10  3 sec x 10    3 sec x 10  3 sec x 10    3 sec x 10  3 sec x 10   Thera-Band   Wall Clock             Dumbbell Bent Over Rows 4# x 20  5# x 20   4# x 20 4# x 20   ER TB  5 sec hold x 10       ER wall walk   Blue TB x 5/2         Ther Activity 03/17/2021 3/19/2021 02/26/2021 03/03/2021 3/5/2021                                                                         Modalities 03/17/2021 3/19/2021 02/26/2021 03/03/2021 3/5/2021                CP             E-STIM Shoulder Performed at end of session  Performed at end of session Performed at end of session Performed at end of session               Assessment: Tolerated treatment well  Patient demonstrated fatigue post treatment and would benefit from continued OT  Increase active shoulder FF after scap mobs and PROM  Denied pain pre an post tx  Plan: Continue per plan of care  Progress treatment as tolerated

## 2021-03-22 ENCOUNTER — APPOINTMENT (OUTPATIENT)
Dept: OCCUPATIONAL THERAPY | Facility: CLINIC | Age: 56
End: 2021-03-22
Payer: MEDICARE

## 2021-03-23 ENCOUNTER — OFFICE VISIT (OUTPATIENT)
Dept: OCCUPATIONAL THERAPY | Facility: CLINIC | Age: 56
End: 2021-03-23
Payer: MEDICARE

## 2021-03-23 ENCOUNTER — TELEPHONE (OUTPATIENT)
Dept: BARIATRICS | Facility: CLINIC | Age: 56
End: 2021-03-23

## 2021-03-23 DIAGNOSIS — M75.81 ROTATOR CUFF TENDONITIS, RIGHT: Primary | ICD-10-CM

## 2021-03-23 PROCEDURE — 97140 MANUAL THERAPY 1/> REGIONS: CPT

## 2021-03-23 PROCEDURE — 97110 THERAPEUTIC EXERCISES: CPT

## 2021-03-23 NOTE — TELEPHONE ENCOUNTER
Patient had called to schedule an appointment at the [facility] office  Called and left message for him to call back to schedule   He had also asked about a prescription that MALGORZATA ePreira had put in for him  He had never filled it and wanted to make sure that it is still able to be filled  Left message for Maria G Banuelos as well

## 2021-03-23 NOTE — PROGRESS NOTES
Daily Note     Today's date: 3/23/2021  Patient name: Truong Cole  : 1965  MRN: [id number]  Referring provider: Josefina Handy MD  Dx:   Encounter Diagnosis     ICD-10-CM    1  Rotator cuff tendonitis, right  M75 81                   Subjective: I totally forgot about my doctor appt today        Objective:   Manuals 2021 3/19/2021 2021 2021 3/5/2021    PROM    performed  performed        scap mobs    performed  performed                                   Neuro Re-Ed  2021 3/19/2021 2021 2021 3/5/2021                                                                                                     Ther Ex 2021 3/19/2021 2021 2021 3/5/2021   Sleeper Stretch 20 sec x 3 20 sec x 3 20 sec x 3 20 sec x 3 20 sec x 3   Shoulder  Shrugs  Retraction    4# x 20  4# x 20    5# x 20  5# x 20    5# x 20  5# x 20    3# x 20  3# x 20    3# x 20  3# x 20   Digi-Flex             UBE Machine 10 Min 55 Resistance 10 min L 60 10 Min 50 Resistance 5F/5B L 55 5F/5B 10 min 55   TB  Sh Ext  Retraction  IR  ER  FF  Biceps Curl  ABD  ADD  W  Triceps Ext  Diagonal Flexion Green  X 20  X 20  X 20  X 20  X 20  X 20  X 20  X 20     X 20  X 20  X 20 Green  X 20  X 20  X 20  X 20  X 20  X 20  X 20  X 20  X 20  X 20  X 20  X 20 Blue  X 20  X 20  X 20  X 20  X 20  X 20  X 20  X 20  X 20  X 20 Green  X 20  X 20  X 20  X 20   X 20  X 20  X 20  X 20  X 20  X 20  X 20 Green  X 20  X 20  X 20  X 20  X 20  X 20  X 20  X 20  X 20  X 20  X 20   Doorway Stretch  Low  high 30 sec x 3 20 sec x 3 20 sec x 3 20 sec x 3       20 sec x 3  20 sec x 3   Lat Stretch w/ Stair Rail 20 sec x 3 20 sec x 3 20 sec x 3 20 sec x 3 20 sec x3   Sidelying  ER  FF          2# x 20  1# x 20    2# x 20  1# x 20   Pulleys  FF  Abd    3 sec x 10  3 sec x 10    3 sec x 10  3 sec x 10    3 sec x 10  3 sec x 10    3 sec x 10  3 sec x 10    3 sec x 10  3 sec x 10   Thera-Band   Wall Clock             Dumbbell Bent Over Rows 4# x 20  5# x 20  5# x 20 4# x 20 4# x 20   ER TB   5 sec hold x 10  5 sec hold  X 10        ER wall walk   Blue TB x 5/2  Blue TB x 5/2       Ther Activity 03/17/2021 3/19/2021 03/23/2021 03/03/2021 3/5/2021                                                                         Modalities 03/17/2021 3/19/2021 03/23/2021 03/03/2021 3/5/2021                CP             E-STIM Shoulder Performed at end of session    Performed at end of session Performed at end of session            Assessment: Tolerated treatment well  Patient exhibited good technique with therapeutic exercises and would benefit from continued OT  Denied pain  Tolerated increase resistance with theraband well  End range shoulder tightness noted, slightly decreased post tx  Plan: Continue per plan of care  Progress treatment as tolerated

## 2021-03-27 ENCOUNTER — HOSPITAL ENCOUNTER (EMERGENCY)
Facility: HOSPITAL | Age: 56
Discharge: HOME | End: 2021-03-28
Attending: EMERGENCY MEDICINE | Admitting: EMERGENCY MEDICINE
Payer: MEDICARE

## 2021-03-27 ENCOUNTER — APPOINTMENT (EMERGENCY)
Dept: RADIOLOGY | Facility: HOSPITAL | Age: 56
End: 2021-03-27
Payer: MEDICARE

## 2021-03-27 DIAGNOSIS — R42 LIGHTHEADEDNESS: Primary | ICD-10-CM

## 2021-03-27 DIAGNOSIS — R73.9 HYPERGLYCEMIA: ICD-10-CM

## 2021-03-27 LAB
ALBUMIN SERPL BCP-MCNC: 3.7 G/DL (ref 3.5–5)
ALP SERPL-CCNC: 115 U/L (ref 46–116)
ALT SERPL W P-5'-P-CCNC: 95 U/L (ref 12–78)
ANION GAP SERPL CALCULATED.3IONS-SCNC: 9 MMOL/L (ref 4–13)
AST SERPL W P-5'-P-CCNC: 31 U/L (ref 5–45)
BASOPHILS # BLD AUTO: 0.05 THOUSANDS/ΜL (ref 0–0.1)
BASOPHILS NFR BLD AUTO: 1 % (ref 0–1)
BILIRUB SERPL-MCNC: 0.4 MG/DL (ref 0.2–1)
BUN SERPL-MCNC: 17 MG/DL (ref 5–25)
CALCIUM SERPL-MCNC: 9.5 MG/DL (ref 8.3–10.1)
CHLORIDE SERPL-SCNC: 102 MMOL/L (ref 100–108)
CO2 SERPL-SCNC: 28 MMOL/L (ref 21–32)
CREAT SERPL-MCNC: 1.24 MG/DL (ref 0.6–1.3)
EOSINOPHIL # BLD AUTO: 0.3 THOUSAND/ΜL (ref 0–0.61)
EOSINOPHIL NFR BLD AUTO: 5 % (ref 0–6)
ERYTHROCYTE [DISTWIDTH] IN BLOOD BY AUTOMATED COUNT: 12.4 % (ref 11.6–15.1)
GFR SERPL CREATININE-BSD FRML MDRD: 65 ML/MIN/1.73SQ M
GLUCOSE SERPL-MCNC: 402 MG/DL (ref 65–140)
GLUCOSE SERPL-MCNC: 487 MG/DL (ref 65–140)
HCT VFR BLD AUTO: 44.5 % (ref 36.5–49.3)
HGB BLD-MCNC: 14.7 G/DL (ref 12–17)
IMM GRANULOCYTES # BLD AUTO: 0.01 THOUSAND/UL (ref 0–0.2)
IMM GRANULOCYTES NFR BLD AUTO: 0 % (ref 0–2)
LYMPHOCYTES # BLD AUTO: 1.97 THOUSANDS/ΜL (ref 0.6–4.47)
LYMPHOCYTES NFR BLD AUTO: 32 % (ref 14–44)
MCH RBC QN AUTO: 29.5 PG (ref 26.8–34.3)
MCHC RBC AUTO-ENTMCNC: 33 G/DL (ref 31.4–37.4)
MCV RBC AUTO: 89 FL (ref 82–98)
MONOCYTES # BLD AUTO: 0.73 THOUSAND/ΜL (ref 0.17–1.22)
MONOCYTES NFR BLD AUTO: 12 % (ref 4–12)
NEUTROPHILS # BLD AUTO: 3.1 THOUSANDS/ΜL (ref 1.85–7.62)
NEUTS SEG NFR BLD AUTO: 50 % (ref 43–75)
NRBC BLD AUTO-RTO: 0 /100 WBCS
PLATELET # BLD AUTO: 175 THOUSANDS/UL (ref 149–390)
PMV BLD AUTO: 11.3 FL (ref 8.9–12.7)
POTASSIUM SERPL-SCNC: 4.2 MMOL/L (ref 3.5–5.3)
PROT SERPL-MCNC: 7.7 G/DL (ref 6.4–8.2)
RBC # BLD AUTO: 4.99 MILLION/UL (ref 3.88–5.62)
SODIUM SERPL-SCNC: 139 MMOL/L (ref 136–145)
TROPONIN I SERPL-MCNC: <0.02 NG/ML
WBC # BLD AUTO: 6.16 THOUSAND/UL (ref 4.31–10.16)

## 2021-03-27 PROCEDURE — 99284 EMERGENCY DEPT VISIT MOD MDM: CPT

## 2021-03-27 PROCEDURE — 99284 EMERGENCY DEPT VISIT MOD MDM: CPT | Performed by: EMERGENCY MEDICINE

## 2021-03-27 PROCEDURE — 71045 X-RAY EXAM CHEST 1 VIEW: CPT

## 2021-03-27 PROCEDURE — 96360 HYDRATION IV INFUSION INIT: CPT

## 2021-03-27 PROCEDURE — 96361 HYDRATE IV INFUSION ADD-ON: CPT

## 2021-03-27 PROCEDURE — 84484 ASSAY OF TROPONIN QUANT: CPT | Performed by: EMERGENCY MEDICINE

## 2021-03-27 PROCEDURE — 93005 ELECTROCARDIOGRAM TRACING: CPT

## 2021-03-27 PROCEDURE — 85025 COMPLETE CBC W/AUTO DIFF WBC: CPT | Performed by: EMERGENCY MEDICINE

## 2021-03-27 PROCEDURE — 80053 COMPREHEN METABOLIC PANEL: CPT | Performed by: EMERGENCY MEDICINE

## 2021-03-27 PROCEDURE — 82948 REAGENT STRIP/BLOOD GLUCOSE: CPT

## 2021-03-27 PROCEDURE — 36415 COLL VENOUS BLD VENIPUNCTURE: CPT | Performed by: EMERGENCY MEDICINE

## 2021-03-27 RX ADMIN — SODIUM CHLORIDE 1000 ML: 0.9 INJECTION, SOLUTION INTRAVENOUS at 22:41

## 2021-03-28 VITALS
DIASTOLIC BLOOD PRESSURE: 94 MMHG | BODY MASS INDEX: 37.56 KG/M2 | TEMPERATURE: 98 F | RESPIRATION RATE: 20 BRPM | OXYGEN SATURATION: 98 % | HEART RATE: 53 BPM | WEIGHT: 233.69 LBS | SYSTOLIC BLOOD PRESSURE: 153 MMHG | HEIGHT: 66 IN

## 2021-03-28 LAB
BACTERIA UR QL AUTO: NORMAL /HPF
BILIRUB UR QL STRIP: NEGATIVE
CLARITY UR: CLEAR
COLOR UR: YELLOW
GLUCOSE UR STRIP-MCNC: ABNORMAL MG/DL
HGB UR QL STRIP.AUTO: NEGATIVE
KETONES UR STRIP-MCNC: NEGATIVE MG/DL
LEUKOCYTE ESTERASE UR QL STRIP: ABNORMAL
NITRITE UR QL STRIP: NEGATIVE
NON-SQ EPI CELLS URNS QL MICRO: NORMAL /HPF
PH UR STRIP.AUTO: 6 [PH]
PROT UR STRIP-MCNC: NEGATIVE MG/DL
RBC #/AREA URNS AUTO: NORMAL /HPF
SP GR UR STRIP.AUTO: 1.01 (ref 1–1.03)
TROPONIN I SERPL-MCNC: <0.02 NG/ML
UROBILINOGEN UR QL STRIP.AUTO: 0.2 E.U./DL
WBC #/AREA URNS AUTO: NORMAL /HPF

## 2021-03-28 PROCEDURE — 93005 ELECTROCARDIOGRAM TRACING: CPT

## 2021-03-28 PROCEDURE — 84484 ASSAY OF TROPONIN QUANT: CPT | Performed by: EMERGENCY MEDICINE

## 2021-03-28 PROCEDURE — 36415 COLL VENOUS BLD VENIPUNCTURE: CPT | Performed by: EMERGENCY MEDICINE

## 2021-03-28 PROCEDURE — 81001 URINALYSIS AUTO W/SCOPE: CPT | Performed by: EMERGENCY MEDICINE

## 2021-03-28 NOTE — ED PROVIDER NOTES
History  Chief Complaint   Patient presents with    Dizziness     patient states approx 30 min ago he felt like he was going to pass out; patient states he has felt lightheaded and dizzy for the last 30 min; patient is diabetic and states "I dont know if this is from my sugar"; denies slurred speech, extremity weakness or any extremity weakness; glucose obtained upon ED arrival is 65      HPI  79-year-old male comes in for evaluation of dizziness  Onset was approximately 30 minutes prior to arrival   Patient states that he was [address]  This he describes as feeling like he was going to pass out  Denies any vertiginous symptoms, denies any unsteadiness  Patient is diabetic, and this happened just after taking his metformin  He denies any weakness in his arms or legs, denies any slurred speech denies any chest pain or pressure  He denies any abdominal pain nausea vomiting  Patient does have history of SVT, has had ablation in the past, states he did not feel like he went into SVT when this happened  Symptoms have resolved on presentation emergency department  Total time was estimated to be 10 minutes  Prior to Admission Medications   Prescriptions Last Dose Informant Patient Reported? Taking?    Accu-Chek FastClix Lancets MISC   No No   Sig: Test blood sugar once daily for fluctuating blood sugars   Blood Glucose Monitoring Suppl (Accu-Chek Joya Plus) w/Device KIT   No No   Sig: Test blood sugar once daily for fluctuating blood sugars   aspirin 81 MG tablet 3/27/2021 at Unknown time Self Yes Yes   Sig: Take 1 tablet by mouth daily   atorvastatin (LIPITOR) 20 mg tablet 3/27/2021 at Unknown time  No Yes   Sig: Take 1 tablet (20 mg total) by mouth daily   cyclobenzaprine (FLEXERIL) 5 mg tablet   No No   Sig: Take 1 tablet (5 mg total) by mouth 2 (two) times a day as needed for muscle spasms for up to 8 doses No driving or operating heavy machinery on this medication   Patient not taking: Reported on 2/10/2021   esomeprazole (NexIUM) 20 mg capsule 3/27/2021 at Unknown time Self Yes Yes   Sig: Take 20 mg by mouth every morning before breakfast   glucose blood (Accu-Chek Joya Plus) test strip   No No   Sig: Test blood sugar once daily for fluctuating blood sugars   lisinopril (ZESTRIL) 20 mg tablet Not Taking at Unknown time  No No   Sig: Take 1 tablet (20 mg total) by mouth daily   Patient not taking: Reported on 2/10/2021   metFORMIN (GLUCOPHAGE-XR) 750 mg 24 hr tablet 3/27/2021 at Unknown time  No Yes   Sig: Take 1 tablet in AM for x 2 weeks, if tolerating increase to 2 tablets daily in AM   methocarbamol (ROBAXIN) 750 mg tablet Not Taking at Unknown time  Yes No   Sig: Take 750 mg by mouth 4 (four) times a day as needed   omeprazole (PriLOSEC OTC) 20 MG tablet 3/27/2021 at Unknown time  No Yes   Sig: Take 1 tablet (20 mg total) by mouth daily   sitaGLIPtin (JANUVIA) 50 mg tablet Not Taking at Unknown time  No No   Sig: Take 1 tablet (50 mg total) by mouth daily   Patient not taking: Reported on 2/10/2021      Facility-Administered Medications: None       Past Medical History:   Diagnosis Date    Acid reflux     GERD (gastroesophageal reflux disease)     TRES (obstructive sleep apnea)     Panic disorder     SVT (supraventricular tachycardia) (HCC)        Past Surgical History:   Procedure Laterality Date    AV NODE ABLATION      CYST REMOVAL      [place] LAP,CHOLECYSTECTOMY N/A 9/24/2019    Procedure: CHOLECYSTECTOMY LAPAROSCOPIC;  Surgeon: Alvaro Chairez DO;  Location: [facility];  Service: General       Family History   Problem Relation Age of Onset    Diabetes Mother     Kidney disease Mother     Liver cancer Mother     Heart disease Mother     Hypertension Mother     Thyroid disease Mother     Diabetes Sister     Hypertension Sister     Thyroid disease Father     Stroke Maternal Uncle      I have reviewed and agree with the history as documented      E-Cigarette/Vaping    E-Cigarette Use Never User      E-Cigarette/Vaping Substances     Social History     Tobacco Use    Smoking status: Former Smoker     Quit date: 2013     Years since quittin 4    Smokeless tobacco: Never Used   Substance Use Topics    Alcohol use: Not Currently     Comment: social    Drug use: Never       Review of Systems   Constitutional: Negative  Negative for chills and fever  HENT: Negative  Negative for ear pain and sore throat  Eyes: Negative  Negative for pain and discharge  Respiratory: Negative  Negative for chest tightness and shortness of breath  Cardiovascular: Negative  Negative for chest pain and palpitations  Gastrointestinal: Negative  Negative for abdominal pain, nausea and vomiting  Endocrine: Negative  Negative for polyphagia and polyuria  Genitourinary: Negative  Negative for dysuria and flank pain  Musculoskeletal: Negative  Negative for arthralgias and back pain  Skin: Negative  Negative for color change and wound  Allergic/Immunologic: Negative  Negative for food allergies and immunocompromised state  Neurological: Positive for light-headedness  Negative for weakness and headaches  Hematological: Negative  Negative for adenopathy  Does not bruise/bleed easily  Psychiatric/Behavioral: Negative  Negative for suicidal ideas  The patient is not nervous/anxious  Physical Exam  Physical Exam  Vitals signs and nursing note reviewed  Constitutional:       General: He is not in acute distress  Appearance: Normal appearance  He is not diaphoretic  HENT:      Head: Normocephalic and atraumatic  Right Ear: External ear normal       Left Ear: External ear normal       Nose: Nose normal  No congestion or rhinorrhea  Mouth/Throat:      Mouth: Mucous membranes are moist    Eyes:      General: No scleral icterus  Right eye: No discharge  Left eye: No discharge        Conjunctiva/sclera: Conjunctivae normal       Pupils: Pupils are equal, round, and reactive to light  Neck:      Musculoskeletal: Normal range of motion and neck supple  No neck rigidity  Cardiovascular:      Rate and Rhythm: Regular rhythm  Pulses: Normal pulses  Heart sounds: Normal heart sounds  Pulmonary:      Effort: Pulmonary effort is normal  No respiratory distress  Breath sounds: Normal breath sounds  No stridor  No wheezing, rhonchi or rales  Abdominal:      General: Abdomen is flat  There is no distension  Palpations: Abdomen is soft  Tenderness: There is no abdominal tenderness  There is no guarding  Musculoskeletal: Normal range of motion  General: No swelling, tenderness or deformity  Skin:     General: Skin is warm and dry  Capillary Refill: Capillary refill takes less than 2 seconds  Findings: No lesion or rash  Neurological:      General: No focal deficit present  Mental Status: He is alert and oriented to person, place, and time  Cranial Nerves: No cranial nerve deficit  Sensory: No sensory deficit  Psychiatric:         Mood and Affect: Mood normal          Behavior: Behavior normal          Thought Content:  Thought content normal          Vital Signs  ED Triage Vitals [03/27/21 2218]   Temperature Pulse Respirations Blood Pressure SpO2   98 °F (36 7 °C) 79 16 (!) 178/102 97 %      Temp Source Heart Rate Source Patient Position - Orthostatic VS BP Location FiO2 (%)   Temporal Monitor Lying Right arm --      Pain Score       No Pain           Vitals:    03/27/21 2330 03/28/21 0000 03/28/21 0030 03/28/21 0230   BP: 162/92 162/88 153/94 153/94   Pulse: 62 (!) 54 (!) 53 (!) 53   Patient Position - Orthostatic VS: Sitting Lying Sitting Sitting         Visual Acuity  Visual Acuity      Most Recent Value   L Pupil Size (mm)  3   R Pupil Size (mm)  3          ED Medications  Medications   sodium chloride 0 9 % bolus 1,000 mL (0 mL Intravenous Stopped 3/28/21 0013)       Diagnostic Studies  Results Reviewed     Procedure Component Value Units Date/Time    Troponin I [[id number]]  (Normal) Collected: 03/28/21 0134    Lab Status: Final result Specimen: Blood from Arm, Right Updated: 03/28/21 0156     Troponin I <0 02 ng/mL     Urine Microscopic [[id number]]  (Normal) Collected: 03/28/21 0014    Lab Status: Final result Specimen: Urine, Clean Catch Updated: 03/28/21 0037     RBC, UA None Seen /hpf      WBC, UA 1-2 /hpf      Epithelial Cells Occasional /hpf      Bacteria, UA None Seen /hpf     UA (URINE) with reflex to Scope [[id number]]  (Abnormal) Collected: 03/28/21 0014    Lab Status: Final result Specimen: Urine, Clean Catch Updated: 03/28/21 0022     Color, UA Yellow     Clarity, UA Clear     Specific Gravity, UA 1 015     pH, UA 6 0     Leukocytes, UA Elevated glucose may cause decreased leukocyte values   See urine microscopic for [facility] result/     Nitrite, UA Negative     Protein, UA Negative mg/dl      Glucose, UA >=1000 (1%) mg/dl      Ketones, UA Negative mg/dl      Urobilinogen, UA 0 2 E U /dl      Bilirubin, UA Negative     Blood, UA Negative    Troponin I [[id number]]  (Normal) Collected: 03/27/21 2240    Lab Status: Final result Specimen: Blood from Arm, Right Updated: 03/27/21 2311     Troponin I <0 02 ng/mL     Comprehensive metabolic panel [[id number]]  (Abnormal) Collected: 03/27/21 2240    Lab Status: Final result Specimen: Blood from Arm, Right Updated: 03/27/21 2310     Sodium 139 mmol/L      Potassium 4 2 mmol/L      Chloride 102 mmol/L      CO2 28 mmol/L      ANION GAP 9 mmol/L      BUN 17 mg/dL      Creatinine 1 24 mg/dL      Glucose 487 mg/dL      Calcium 9 5 mg/dL      AST 31 U/L      ALT 95 U/L      Alkaline Phosphatase 115 U/L      Total Protein 7 7 g/dL      Albumin 3 7 g/dL      Total Bilirubin 0 40 mg/dL      eGFR 65 ml/min/1 73sq m     Narrative:      Eva guidelines for Chronic Kidney Disease (CKD):     Stage 1 with normal or high GFR (GFR > 90 mL/min/1 73 square meters)    Stage 2 Mild CKD (GFR = 60-89 mL/min/1 73 square meters)    Stage 3A Moderate CKD (GFR = 45-59 mL/min/1 73 square meters)    Stage 3B Moderate CKD (GFR = 30-44 mL/min/1 73 square meters)    Stage 4 Severe CKD (GFR = 15-29 mL/min/1 73 square meters)    Stage 5 End Stage CKD (GFR <15 mL/min/1 73 square meters)  Note: GFR calculation is accurate only with a steady state creatinine    CBC and differential [[id number]] Collected: 03/27/21 2240    Lab Status: Final result Specimen: Blood from Arm, Right Updated: 03/27/21 2254     WBC 6 16 Thousand/uL      RBC 4 99 Million/uL      Hemoglobin 14 7 g/dL      Hematocrit 44 5 %      MCV 89 fL      MCH 29 5 pg      MCHC 33 0 g/dL      RDW 12 4 %      MPV 11 3 fL      Platelets 315 Thousands/uL      nRBC 0 /100 WBCs      Neutrophils Relative 50 %      Immat GRANS % 0 %      Lymphocytes Relative 32 %      Monocytes Relative 12 %      Eosinophils Relative 5 %      Basophils Relative 1 %      Neutrophils Absolute 3 10 Thousands/µL      Immature Grans Absolute 0 01 Thousand/uL      Lymphocytes Absolute 1 97 Thousands/µL      Monocytes Absolute 0 73 Thousand/µL      Eosinophils Absolute 0 30 Thousand/µL      Basophils Absolute 0 05 Thousands/µL     Fingerstick Glucose (POCT) [[id number]]  (Abnormal) Collected: 03/27/21 2218    Lab Status: Final result Updated: 03/27/21 2219     POC Glucose 402 mg/dl                  XR chest 1 view portable    (Results Pending)              Procedures  Procedures         ED Course  ED Course as of Mar 28 1238   Sun Mar 28, 2021   0028 - ED discharge glucose in patients with moderate to severe hyperglycemia was not associated with 7-day outcomes of repeat ED visit for hyperglycemia or hospitalization  Attaining a specific glucose goal before discharge in patients does not serve a function and so will not be pursued  ([address] [facility] 2016 Jun 25  pii: [phone]  doi: 10 1016/j annemered  2016 04 057 )       This EKG was interpreted by me  The EKG demonstrates Normal sinus rhythm, normal intervals and axis, normal QRS, no acute ST changes present  MDM  Number of Diagnoses or Management Options  Diagnosis management comments: Some well-appearing nontoxic man who comes in with lightheadedness that lasted approximately 10 minutes  Symptoms have resolved  He has no chest pain  Does have multiple cardiac risk factors  Will get a CBC, CMP and troponin  Will get EKG and chest x-ray  Given that symptoms happened just prior to arrival   Will do delta troponin EKG in case this is anginal equivalent  Patient is chest pain-free and if delta troponin EKG is normal, patient will be discharged home  Disposition  Final diagnoses:   Lightheadedness   Hyperglycemia     Time reflects when diagnosis was documented in both MDM as applicable and the Disposition within this note     Time User Action Codes Description Comment    3/28/2021 12:42 AM Gabriela Lindsey Add [R42] Lightheadedness     3/28/2021 12:42 AM Gabriela Lindsey Add [R73 9] Hyperglycemia       ED Disposition     ED Disposition Condition Date/Time Comment    Discharge Stable Sun Mar 28, 2021  2:00 AM Tari Hanna discharge to home/self care              Follow-up Information     Follow up With Specialties Details Why Contact Info Additional Information    Arvin Hewitt PA-C Physician Assistant Call  follow up being seen in the emergency department [address]  [facility] [address] [facility] Emergency Department Emergency Medicine Go to  As needed, If symptoms worsen [facility] [address] [phone]  [address] [facility] Emergency Department[address], [address]          Discharge Medication List as of 3/28/2021  2:00 AM      CONTINUE these medications which have NOT CHANGED    Details   aspirin 81 MG tablet Take 1 tablet by mouth daily, Historical Med      atorvastatin (LIPITOR) 20 mg tablet Take 1 tablet (20 mg total) by mouth daily, Starting Tue 12/1/2020, Normal      esomeprazole (NexIUM) 20 mg capsule Take 20 mg by mouth every morning before breakfast, Historical Med      metFORMIN (GLUCOPHAGE-XR) 750 mg 24 hr tablet Take 1 tablet in AM for x 2 weeks, if tolerating increase to 2 tablets daily in AM, Normal      omeprazole (PriLOSEC OTC) 20 MG tablet Take 1 tablet (20 mg total) by mouth daily, Starting Fri 10/30/2020, Normal      Accu-Chek FastClix Lancets MISC Test blood sugar once daily for fluctuating blood sugars, Normal      Blood Glucose Monitoring Suppl (Accu-Chek Joya Plus) w/Device KIT Test blood sugar once daily for fluctuating blood sugars, Normal      cyclobenzaprine (FLEXERIL) 5 mg tablet Take 1 tablet (5 mg total) by mouth 2 (two) times a day as needed for muscle spasms for up to 8 doses No driving or operating heavy machinery on this medication, Starting Mon 12/28/2020, Normal      glucose blood (Accu-Chek Joya Plus) test strip Test blood sugar once daily for fluctuating blood sugars, Normal      lisinopril (ZESTRIL) 20 mg tablet Take 1 tablet (20 mg total) by mouth daily, Starting Tue 12/1/2020, Normal      methocarbamol (ROBAXIN) 750 mg tablet Take 750 mg by mouth 4 (four) times a day as needed, Starting Thu 1/28/2021, Until Sun 2/7/2021, Historical Med      sitaGLIPtin (JANUVIA) 50 mg tablet Take 1 tablet (50 mg total) by mouth daily, Starting Fri 1/29/2021, Normal           No discharge procedures on file      PDMP Review     None          ED Provider  Electronically Signed by           Lanre Gutierrez MD  03/28/21 6239

## 2021-03-28 NOTE — DISCHARGE INSTRUCTIONS
Please follow-up with primary care provider regarding your elevated blood glucose  If anything changes or worsens, please return the emergency department

## 2021-03-29 LAB
ATRIAL RATE: 77 BPM
P AXIS: 48 DEGREES
PR INTERVAL: 172 MS
QRS AXIS: 35 DEGREES
QRSD INTERVAL: 76 MS
QT INTERVAL: 376 MS
QTC INTERVAL: 425 MS
T WAVE AXIS: 50 DEGREES
VENTRICULAR RATE: 77 BPM

## 2021-03-29 PROCEDURE — 93010 ELECTROCARDIOGRAM REPORT: CPT | Performed by: INTERNAL MEDICINE

## 2021-03-30 ENCOUNTER — OFFICE VISIT (OUTPATIENT)
Dept: OCCUPATIONAL THERAPY | Facility: CLINIC | Age: 56
End: 2021-03-30
Payer: MEDICARE

## 2021-03-30 ENCOUNTER — OFFICE VISIT (OUTPATIENT)
Dept: OBGYN CLINIC | Facility: CLINIC | Age: 56
End: 2021-03-30
Payer: MEDICARE

## 2021-03-30 VITALS
HEART RATE: 61 BPM | HEIGHT: 66 IN | SYSTOLIC BLOOD PRESSURE: 152 MMHG | BODY MASS INDEX: 37.45 KG/M2 | WEIGHT: 233 LBS | DIASTOLIC BLOOD PRESSURE: 94 MMHG

## 2021-03-30 DIAGNOSIS — M75.81 ROTATOR CUFF TENDONITIS, RIGHT: Primary | ICD-10-CM

## 2021-03-30 LAB
ATRIAL RATE: 54 BPM
P AXIS: 52 DEGREES
PR INTERVAL: 182 MS
QRS AXIS: 12 DEGREES
QRSD INTERVAL: 78 MS
QT INTERVAL: 442 MS
QTC INTERVAL: 419 MS
T WAVE AXIS: 32 DEGREES
VENTRICULAR RATE: 54 BPM

## 2021-03-30 PROCEDURE — 97110 THERAPEUTIC EXERCISES: CPT

## 2021-03-30 PROCEDURE — 93010 ELECTROCARDIOGRAM REPORT: CPT | Performed by: INTERNAL MEDICINE

## 2021-03-30 PROCEDURE — 99213 OFFICE O/P EST LOW 20 MIN: CPT | Performed by: ORTHOPAEDIC SURGERY

## 2021-03-30 PROCEDURE — 97140 MANUAL THERAPY 1/> REGIONS: CPT

## 2021-03-30 NOTE — PROGRESS NOTES
Daily Note     Today's date: 3/30/2021  Patient name: Helen Walter  : 1965  MRN: [id number]  Referring provider: Cole Nieves MD  Dx:   Encounter Diagnosis     ICD-10-CM    1  Rotator cuff tendonitis, right  M75 81                   Subjective: I just saw the doctor she said I'm doing good  Objective: See treatment diary below      Assessment: Tolerated treatment well  Patient demonstrated fatigue post treatment and would benefit from continued OT  Pt required VC for proper technique with there ex  Tricep trightness noted decreased post stretching, mild end range tightness noted all directions decreased post tx  Plan: Continue per plan of care  Progress treatment as tolerated         PROM    performed  performed  performed      scap mobs    performed  performed  performed                                 Neuro Re-Ed  2021 3/19/2021 2021 2021 3/5/2021                                                                                                     Ther Ex 2021 3/19/2021 2021 2021 3/5/2021   Sleeper Stretch 20 sec x 3 20 sec x 3 20 sec x 3 20 sec x 3 20 sec x 3   Shoulder  Shrugs  Retraction    4# x 20  4# x 20    5# x 20  5# x 20    5# x 20  5# x 20    5# x 20  5# x 20    3# x 20  3# x 20   Digi-Flex             UBE Machine 10 Min 55 Resistance 10 min L 60 10 Min 50 Resistance 5F/5B L 55 5F/5B 10 min 55   TB  Sh Ext  Retraction  IR  ER  FF  Biceps Curl  ABD  ADD  W  Triceps Ext  Diagonal Flexion Green  X 20  X 20  X 20  X 20  X 20  X 20  X 20  X 20     X 20  X 20  X 20 Green  X 20  X 20  X 20  X 20  X 20  X 20  X 20  X 20  X 20  X 20  X 20  X 20 Blue  X 20  X 20  X 20  X 20  X 20  X 20  X 20  X 20  X 20  X 20 Blue  X 20  X 20  X 20  X 20   X 20  X 20  X 20  X 20  X 20  X 20  X 20 Green  X 20  X 20  X 20  X 20  X 20  X 20  X 20  X 20  X 20  X 20  X 20   Doorway Stretch  Low  high 30 sec x 3 20 sec x 3 20 sec x 3 20 sec x 3       20 sec x 3  20 sec x 3   Lat Stretch w/ Stair Rail 20 sec x 3 20 sec x 3 20 sec x 3 20 sec x 3 20 sec x3   Sidelying  ER  FF          2# x 20      2# x 20  1# x 20   Pulleys  FF  Abd    3 sec x 10  3 sec x 10    3 sec x 10  3 sec x 10    3 sec x 10  3 sec x 10    3 sec x 10  3 sec x 10    3 sec x 10  3 sec x 10   Thera-Band   Wall Clock             Dumbbell Bent Over Rows 4# x 20  5# x 20  5# x 20 5# x 20 4# x 20   ER TB   5 sec hold x 10  5 sec hold  X 10  5 sec hold x 10      ER wall walk   Blue TB x 5/2  Blue TB x 5/2  Blue TB x 5     tricep stretch against wall    10 sec hold x 3    Ther Activity 03/17/2021 3/19/2021 03/23/2021 03/30/2021 3/5/2021                                                                         Modalities 03/17/2021 3/19/2021 03/23/2021 03/30/2021 3/5/2021   MH             CP             E-STIM Shoulder Performed at end of session      Performed at end of session

## 2021-03-30 NOTE — PROGRESS NOTES
Assessment:     1  Rotator cuff tendonitis, right          Plan:   Diagnoses and all orders for this visit:    Rotator cuff tendonitis, right         48 y o  male with right shoulder rotator cuff tendinitis, making very good progress with PT  I discussed with the patient that I am seeing an improvement in his motion at this time  I discussed that he should continue to go to physical therapy and once therapy is discontinued he should still continue his home exercises and stretching to maintain his progress  I will see him back in office on an as needed basis if symptoms worsen or fail to improve  Patient ID: Gisel Parker is a 48 y o  male  Chief Complaint:  Follow-up rotator cuff tendinitis     HPI:  48 y o  male presents to the office today for his right rotator cuff tendonitis  Patient stated that he has been working with physical therapy and notes significant improvement  Patient stated that he feels as if he is 70% improved  Patient stated that he attributes much of his improvement to physical therapy  Patient denied any new traumas about the right shoulder  Patient denied any numbness or tingling  Patient did not have any previous injections about the right shoulder         Allergy:  Allergies   Allergen Reactions    Metformin GI Intolerance       Medications:  all current active meds have been reviewed    Past Medical History:  Past Medical History:   Diagnosis Date    Acid reflux     GERD (gastroesophageal reflux disease)     TRES (obstructive sleep apnea)     Panic disorder     SVT (supraventricular tachycardia) (HCC)        Past Surgical History:  Past Surgical History:   Procedure Laterality Date    AV NODE ABLATION      CYST REMOVAL      [place] LAP,CHOLECYSTECTOMY N/A 9/24/2019    Procedure: CHOLECYSTECTOMY LAPAROSCOPIC;  Surgeon: Saurabh oJe DO;  Location: [facility];  Service: General       Family History:  Family History   Problem Relation Age of Onset    Diabetes Mother     Kidney disease Mother     Liver cancer Mother     Heart disease Mother     Hypertension Mother     Thyroid disease Mother     Diabetes Sister     Hypertension Sister     Thyroid disease Father     Stroke Maternal Uncle        Social History:  Social History     Substance and Sexual Activity   Alcohol Use Not Currently    Comment: social     Social History     Substance and Sexual Activity   Drug Use Never     Social History     Tobacco Use   Smoking Status Former Smoker    Quit date: 2013    Years since quittin 4   Smokeless Tobacco Never Used           ROS:  Review of Systems   Constitutional: Negative for chills and fever  HENT: Negative for ear pain and sore throat  Eyes: Negative for pain and visual disturbance  Respiratory: Negative for cough and shortness of breath  Cardiovascular: Negative for chest pain and palpitations  Gastrointestinal: Negative for abdominal pain and vomiting  Genitourinary: Negative for dysuria and hematuria  Musculoskeletal: Positive for arthralgias  Negative for back pain  Skin: Negative for color change and rash  Neurological: Negative for seizures and syncope  All other systems reviewed and are negative  Objective:  BP Readings from Last 1 Encounters:   21 152/94      Wt Readings from Last 1 Encounters:   21 106 kg (233 lb)        BMI:   Estimated body mass index is 37 61 kg/m² as calculated from the following:    Height as of this encounter: 5' 6" (1 676 m)  Weight as of this encounter: 106 kg (233 lb)  EXAM:   Physical Exam  Right Shoulder Exam     Range of Motion   Forward flexion: 170   Internal rotation 0 degrees: normal Right shoulder internal rotation 0 degrees: crepitus noted  Muscle Strength   The patient has normal right shoulder strength      Tests   Impingement: negative    Other   Erythema: absent  Scars: absent  Sensation: normal  Pulse: present    Comments:  Tight with cross body adduction       Left Shoulder Exam Left shoulder exam is normal               Radiographs:  none today      Scribe Attestation    I,:  Baylee Varela am acting as a scribe while in the presence of the attending physician :       I,:  Robinson Aleman MD personally performed the services described in this documentation    as scribed in my presence :

## 2021-03-30 NOTE — PROGRESS NOTES
Assessment:     1  Rotator cuff tendonitis, right          Plan:     Problem List Items Addressed This Visit        Musculoskeletal and Integument    Rotator cuff tendonitis, right - Primary            20-year-old male with right shoulder rotator cuff tendinitis  I reviewed the radiographs and treatment options with him  He has excellent strength I see no signs of significant tear today  I have strongly recommended physical therapy which he was happy to do  I also discussed possible subacromial injection but he wished to wait on that at this time  I will be happy to see him back in the future if he fails to have significant improvement with therapy for re-evaluation and possible injection  He shows good understanding of the plan  He has no restrictions   Follow-up as needed    Patient ID: Seun Fang is a 48 y o  male  Chief Complaint:  Right shoulder pain    HPI:  20-year-old male here today for pain in his right shoulder  About two months ago he started noticing pain in the shoulder  It has radiated up into the neck and down distally at times but it is primarily a sharp pain that is localized to the shoulder region  He had no specific injury  He is right-hand dominant and 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  If he lays on that side it is worse  He sometimes has trouble with abducting the arm especially with weight  He has been taking Excedrin for it  He has not done any therapy or had any injections  He denies any numbness but has occasional tingling  He has also taken a muscle relaxant  Nighttime causes him the most discomfort  It has gotten slightly worse in the last months to the point where he had to go into the emergency room on two different occasions because the pain was so severe  He is looking for relief        Allergy:  Allergies   Allergen Reactions    Metformin GI Intolerance       Medications:  all current active meds have been reviewed    Past Medical History:  Past Medical History:   Diagnosis Date    Acid reflux     GERD (gastroesophageal reflux disease)     TRES (obstructive sleep apnea)     Panic disorder     SVT (supraventricular tachycardia) (HCC)        Past Surgical History:  Past Surgical History:   Procedure Laterality Date    AV NODE ABLATION      CYST REMOVAL      MO LAP,CHOLECYSTECTOMY N/A 2019    Procedure: CHOLECYSTECTOMY LAPAROSCOPIC;  Surgeon: Cinthia Hammer DO;  Location: [facility];  Service: General       Family History:  Family History   Problem Relation Age of Onset    Diabetes Mother     Kidney disease Mother     Liver cancer Mother     Heart disease Mother     Hypertension Mother     Thyroid disease Mother     Diabetes Sister     Hypertension Sister     Thyroid disease Father     Stroke Maternal Uncle        Social History:  Social History     Substance and Sexual Activity   Alcohol Use Not Currently    Comment: social     Social History     Substance and Sexual Activity   Drug Use Never     Social History     Tobacco Use   Smoking Status Former Smoker    Quit date: 2013    Years since quittin 4   Smokeless Tobacco Never Used           ROS:  Review of Systems   Constitutional: Negative  Negative for chills and fever  HENT: Negative  Negative for ear pain and sore throat  Eyes: Negative  Negative for pain and visual disturbance  Respiratory: Negative  Negative for cough and shortness of breath  Cardiovascular: Negative  Negative for chest pain and palpitations  Gastrointestinal: Negative  Negative for abdominal pain and vomiting  Endocrine: Negative  Genitourinary: Negative  Negative for dysuria and hematuria  Musculoskeletal: Positive for arthralgias  Negative for back pain  Skin: Negative  Negative for color change and rash  Allergic/Immunologic: Negative  Neurological: Negative  Negative for seizures and syncope  Hematological: Negative  Psychiatric/Behavioral: Negative      All other systems reviewed and are negative  Objective:  BP Readings from Last 1 Encounters:   03/30/21 152/94      Wt Readings from Last 1 Encounters:   03/30/21 106 kg (233 lb)        BMI:   Estimated body mass index is 37 61 kg/m² as calculated from the following:    Height as of this encounter: 5' 6" (1 676 m)  Weight as of this encounter: 106 kg (233 lb)  EXAM:   Physical Exam  Constitutional:       General: He is not in acute distress  Appearance: He is well-developed  He is not diaphoretic  HENT:      Head: Normocephalic and atraumatic  Eyes:      General:         Right eye: No discharge  Left eye: No discharge  Neck:      Musculoskeletal: Normal range of motion and neck supple  Trachea: No tracheal deviation  Cardiovascular:      Rate and Rhythm: Normal rate and regular rhythm  Pulmonary:      Effort: Pulmonary effort is normal  No respiratory distress  Abdominal:      General: There is no distension  Palpations: Abdomen is soft  Tenderness: There is no abdominal tenderness  Skin:     General: Skin is warm  Findings: No erythema  Neurological:      Mental Status: He is alert and oriented to person, place, and time  Psychiatric:         Behavior: Behavior normal        Right Shoulder Exam     Tenderness   The patient is experiencing no tenderness  Range of Motion   External rotation: normal   Forward flexion: 170   Internal rotation 0 degrees: normal     Muscle Strength   The patient has normal right shoulder strength  Tests   Apprehension: negative  Beltran test: negative  Cross arm: negative  Impingement: positive  Drop arm: negative    Other   Erythema: absent  Sensation: normal  Pulse: present    Comments:  Negative obreins, neg speeds      Left Shoulder Exam   Left shoulder exam is normal     Tenderness   The patient is experiencing no tenderness  Range of Motion   The patient has normal left shoulder ROM      Muscle Strength   The patient has normal left shoulder strength  Tests   Apprehension: negative  Beltran test: negative  Cross arm: negative  Impingement: negative  Drop arm: negative    Other   Erythema: absent  Sensation: normal  Pulse: present               Radiographs:  I have personally reviewed pertinent films in PACS and my interpretation is   Very mild degenerative changes of the glenohumeral joint with moderate changes of the acromioclavicular joint  Humeral head is well centered in the glenoid  No calcific tendinitis noted

## 2021-03-31 ENCOUNTER — OFFICE VISIT (OUTPATIENT)
Dept: FAMILY MEDICINE CLINIC | Facility: CLINIC | Age: 56
End: 2021-03-31
Payer: MEDICARE

## 2021-03-31 VITALS
SYSTOLIC BLOOD PRESSURE: 112 MMHG | DIASTOLIC BLOOD PRESSURE: 90 MMHG | TEMPERATURE: 97.5 F | HEIGHT: 66 IN | BODY MASS INDEX: 37.96 KG/M2 | WEIGHT: 236.2 LBS | HEART RATE: 76 BPM | OXYGEN SATURATION: 95 %

## 2021-03-31 DIAGNOSIS — E11.9 TYPE 2 DIABETES MELLITUS WITHOUT COMPLICATION, WITHOUT LONG-TERM CURRENT USE OF INSULIN (HCC): Primary | ICD-10-CM

## 2021-03-31 DIAGNOSIS — Z91.19 NON-COMPLIANT PATIENT: ICD-10-CM

## 2021-03-31 LAB — SL AMB POCT HEMOGLOBIN AIC: 10 (ref ?–6.5)

## 2021-03-31 PROCEDURE — 83036 HEMOGLOBIN GLYCOSYLATED A1C: CPT | Performed by: PHYSICIAN ASSISTANT

## 2021-03-31 PROCEDURE — 99213 OFFICE O/P EST LOW 20 MIN: CPT | Performed by: PHYSICIAN ASSISTANT

## 2021-03-31 NOTE — PROGRESS NOTES
Assessment/Plan:    Problem List Items Addressed This Visit        Endocrine    Type 2 diabetes mellitus without complication, without long-term current use of insulin ([facility] ) - Primary    Relevant Orders    POCT hemoglobin A1c (Completed)      Other Visit Diagnoses     Non-compliant patient               Diagnoses and all orders for this visit:    Type 2 diabetes mellitus without complication, without long-term current use of insulin ([facility] )  -     POCT hemoglobin A1c    Non-compliant patient        Cate Jose agrees to start taking metformin  mg daily  After 1-2 weeks, he is to take 2 tabs daily  He needs to restart lisinopril and lipitor as he apparently stopped taking those, too  He will follow up with weight management tomorrow  He needs to RTO in 3 months for repeat labs  If he has any questions or problems, I told him he needs to RTO or call as he cannot just stop medications on his own  Subjective:      Patient ID: Gini Dockery is a 48 y o  male  Cate Jose is here today for ER follow up  Was recently seen for lightheadedness and was diagnosed with hyperglycemia (487)  He has been extremely non-compliant in not taking medications for diabetes  He stopped taking metformin 500 mg due to GI upset and did not take Januvia  He met with weight management once and she prescribed metformin XR in February  He started taking it 2 days ago and already did not take it this morning (day 3)  A1C in November was 7 5% () and today is 10 0% ()  He is scheduled to return to weight management tomorrow, admits that he has not been sticking with his diet plan  The following portions of the patient's history were reviewed and updated as appropriate:   He has a past medical history of Acid reflux, GERD (gastroesophageal reflux disease), TRES (obstructive sleep apnea), Panic disorder, and SVT (supraventricular tachycardia) ([facility] )  ,  does not have any pertinent problems on file  ,   has a past surgical history that includes Cyst Removal; AV node ablation; and pr lap,cholecystectomy (N/A, 9/24/2019)  ,  family history includes Diabetes in his mother and sister; Heart disease in his mother; Hypertension in his mother and sister; Kidney disease in his mother; Liver cancer in his mother; Stroke in his maternal uncle; Thyroid disease in his father and mother  ,   reports that he quit smoking about 7 years ago  He has never used smokeless tobacco  He reports previous alcohol use  He reports that he does not use drugs  ,  is allergic to metformin     Current Outpatient Medications   Medication Sig Dispense Refill    Accu-Chek FastClix Lancets MISC Test blood sugar once daily for fluctuating blood sugars 100 each 3    aspirin 81 MG tablet Take 1 tablet by mouth daily      atorvastatin (LIPITOR) 20 mg tablet Take 1 tablet (20 mg total) by mouth daily 90 tablet 0    lisinopril (ZESTRIL) 20 mg tablet Take 1 tablet (20 mg total) by mouth daily 90 tablet 0    metFORMIN (GLUCOPHAGE-XR) 750 mg 24 hr tablet Take 1 tablet in AM for x 2 weeks, if tolerating increase to 2 tablets daily in AM 60 tablet 1    omeprazole (PriLOSEC OTC) 20 MG tablet Take 1 tablet (20 mg total) by mouth daily 30 tablet 2    Blood Glucose Monitoring Suppl (Accu-Chek Joya Plus) w/Device KIT Test blood sugar once daily for fluctuating blood sugars (Patient not taking: Reported on 3/30/2021) 1 kit 0    cyclobenzaprine (FLEXERIL) 5 mg tablet Take 1 tablet (5 mg total) by mouth 2 (two) times a day as needed for muscle spasms for up to 8 doses No driving or operating heavy machinery on this medication (Patient not taking: Reported on 3/30/2021) 8 tablet 0    esomeprazole (NexIUM) 20 mg capsule Take 20 mg by mouth every morning before breakfast      glucose blood (Accu-Chek Joya Plus) test strip Test blood sugar once daily for fluctuating blood sugars (Patient not taking: Reported on 3/30/2021) 100 each 3    methocarbamol (ROBAXIN) 750 mg tablet Take 750 mg by mouth 4 (four) times a day as needed      sitaGLIPtin (JANUVIA) 50 mg tablet Take 1 tablet (50 mg total) by mouth daily (Patient not taking: Reported on 3/30/2021) 90 tablet 0     No current facility-administered medications for this visit  Review of Systems   Constitutional: Negative for activity change, appetite change, chills, diaphoresis, fatigue, fever and unexpected weight change  HENT: Negative for congestion, ear pain, postnasal drip, rhinorrhea, sinus pressure, sinus pain, sneezing, sore throat, tinnitus and voice change  Eyes: Negative for pain, redness and visual disturbance  Respiratory: Negative for cough, chest tightness, shortness of breath and wheezing  Cardiovascular: Negative for chest pain, palpitations and leg swelling  Gastrointestinal: Negative for abdominal pain, blood in stool, constipation, diarrhea, nausea and vomiting  Genitourinary: Negative for difficulty urinating, dysuria, frequency, hematuria and urgency  Musculoskeletal: Negative for arthralgias, back pain, gait problem, joint swelling, myalgias, neck pain and neck stiffness  Skin: Negative for color change, pallor, rash and wound  Neurological: Negative for dizziness, tremors, weakness, light-headedness and headaches  Psychiatric/Behavioral: Negative for dysphoric mood, self-injury, sleep disturbance and suicidal ideas  The patient is not nervous/anxious  Objective:  Vitals:    03/31/21 1031   BP: 112/90   Pulse: 76   Temp: 97 5 °F (36 4 °C)   SpO2: 95%   Weight: 107 kg (236 lb 3 2 oz)   Height: 5' 6" (1 676 m)     Body mass index is 38 12 kg/m²  Physical Exam  Vitals signs reviewed  Constitutional:       General: He is not in acute distress  Appearance: He is well-developed  He is obese  He is not ill-appearing, toxic-appearing or diaphoretic  HENT:      Head: Normocephalic and atraumatic        Right Ear: Hearing, tympanic membrane, ear canal and external ear normal       Left Ear: Hearing, tympanic membrane, ear canal and external ear normal       Mouth/Throat:      Pharynx: Uvula midline  No oropharyngeal exudate  Eyes:      General: No scleral icterus  Right eye: No discharge  Left eye: No discharge  Conjunctiva/sclera: Conjunctivae normal    Neck:      Musculoskeletal: Neck supple  Thyroid: No thyromegaly  Vascular: No carotid bruit  Cardiovascular:      Rate and Rhythm: Normal rate and regular rhythm  Heart sounds: Normal heart sounds  No murmur  Pulmonary:      Effort: Pulmonary effort is normal  No respiratory distress  Breath sounds: Normal breath sounds  No wheezing  Abdominal:      General: Bowel sounds are normal  There is no distension  Palpations: Abdomen is soft  There is no mass  Tenderness: There is no abdominal tenderness  There is no guarding or rebound  Musculoskeletal: Normal range of motion  General: No tenderness  Lymphadenopathy:      Cervical: No cervical adenopathy  Skin:     General: Skin is warm and dry  Findings: No erythema or rash  Neurological:      Mental Status: He is alert and oriented to person, place, and time  Psychiatric:         Behavior: Behavior normal          Thought Content:  Thought content normal          Judgment: Judgment normal

## 2021-04-01 ENCOUNTER — OFFICE VISIT (OUTPATIENT)
Dept: OCCUPATIONAL THERAPY | Facility: CLINIC | Age: 56
End: 2021-04-01
Payer: MEDICARE

## 2021-04-01 ENCOUNTER — OFFICE VISIT (OUTPATIENT)
Dept: BARIATRICS | Facility: CLINIC | Age: 56
End: 2021-04-01

## 2021-04-01 VITALS — WEIGHT: 236.2 LBS | BODY MASS INDEX: 37.96 KG/M2 | HEIGHT: 66 IN

## 2021-04-01 DIAGNOSIS — M75.81 ROTATOR CUFF TENDONITIS, RIGHT: Primary | ICD-10-CM

## 2021-04-01 DIAGNOSIS — R63.5 ABNORMAL WEIGHT GAIN: ICD-10-CM

## 2021-04-01 PROCEDURE — WMDI30

## 2021-04-01 PROCEDURE — RECHECK

## 2021-04-01 PROCEDURE — 97110 THERAPEUTIC EXERCISES: CPT

## 2021-04-01 PROCEDURE — 97140 MANUAL THERAPY 1/> REGIONS: CPT

## 2021-04-01 NOTE — PROGRESS NOTES
Daily Note     Today's date: 2021  Patient name: Cathy Thompson  : 1965  MRN: [id number]  Referring provider: Yina Torres MD  Dx:   Encounter Diagnosis     ICD-10-CM    1  Rotator cuff tendonitis, right  M75 81                   Subjective: "It has been getting stronger "      Objective: See treatment diary below      Assessment: Tolerated treatment well  Patient exhibited good technique with therapeutic exercises and would benefit from continued OT  Patient tolerated increases in several progressive exercises this session as able  Patient reports an improvement in strength and ROM since last week  Patient had follow-up with Ortho yesterday with a recommendation to continue therapy to address strengthening  Plan: Continue per plan of care  Progress treatment as tolerated         Manual 2021   PROM    performed  performed  performed      scap mobs    performed  performed  performed                                 Neuro Re-Ed  2021 3/19/2021 2021 2021 2021                                                                                                     Ther Ex 2021 3/19/2021 2021 2021 2021   Sleeper Stretch 20 sec x 3 20 sec x 3 20 sec x 3 20 sec x 3 20 sec x 3   Shoulder  Shrugs  Retraction    4# x 20  4# x 20    5# x 20  5# x 20    5# x 20  5# x 20    5# x 20  5# x 20    5# x 20  5# x 20   Digi-Flex             UBE Machine 10 Min 55 Resistance 10 min L 60 10 Min 50 Resistance 5F/5B L 55 5F/5B 10 min 55   TB  Sh Ext  Retraction  IR  ER  FF  Biceps Curl  ABD  ADD  W  Triceps Ext  Diagonal Flexion Green  X 20  X 20  X 20  X 20  X 20  X 20  X 20  X 20     X 20  X 20  X 20 Green  X 20  X 20  X 20  X 20  X 20  X 20  X 20  X 20  X 20  X 20  X 20  X 20 Blue  X 20  X 20  X 20  X 20  X 20  X 20  X 20  X 20  X 20  X 20 Blue  X 20  X 20  X 20  X 20   X 20  X 20  X 20  X 20  X 20  X 20  X 20 Blue  X 20  X 20  X 20  X 20  X 20  X 20  X 20  X 20  X 20  X 20  X 20   Doorway Stretch  Low  high 30 sec x 3 20 sec x 3 20 sec x 3 20 sec x 3 20 sec x 3     Lat Stretch w/ Stair Rail 20 sec x 3 20 sec x 3 20 sec x 3 20 sec x 3 20 sec x3   Sidelying  ER  FF          2# x 20      2# x 20  1# x 20   Pulleys  FF  Abd    3 sec x 10  3 sec x 10    3 sec x 10  3 sec x 10    3 sec x 10  3 sec x 10    3 sec x 10  3 sec x 10    3 sec x 10  3 sec x 10   Thera-Band   Wall Clock             Dumbbell Bent Over Rows 4# x 20  5# x 20  5# x 20 5# x 20 5# x 20   ER TB   5 sec hold x 10  5 sec hold  X 10  5 sec hold x 10  5 sec hold x 10   ER wall walk   Blue TB x 5/2  Blue TB x 5/2  Blue TB x 5  Blue TB x 5   tricep stretch against wall    10 sec hold x 3 10 sec hold x 3           Ther Activity 03/17/2021 3/19/2021 03/23/2021 03/30/2021 04/01/2021                                                                         Modalities 03/17/2021 3/19/2021 03/23/2021 03/30/2021 04/01/2021   MH             CP             E-STIM Shoulder Performed at end of session

## 2021-04-06 ENCOUNTER — OFFICE VISIT (OUTPATIENT)
Dept: FAMILY MEDICINE CLINIC | Facility: CLINIC | Age: 56
End: 2021-04-06
Payer: MEDICARE

## 2021-04-06 VITALS
HEART RATE: 63 BPM | DIASTOLIC BLOOD PRESSURE: 86 MMHG | SYSTOLIC BLOOD PRESSURE: 110 MMHG | OXYGEN SATURATION: 97 % | BODY MASS INDEX: 38.06 KG/M2 | WEIGHT: 236.8 LBS | TEMPERATURE: 97.8 F | HEIGHT: 66 IN

## 2021-04-06 DIAGNOSIS — L02.512 ABSCESS OF FINGER OF LEFT HAND: Primary | ICD-10-CM

## 2021-04-06 PROCEDURE — 26010 DRAINAGE OF FINGER ABSCESS: CPT | Performed by: PHYSICIAN ASSISTANT

## 2021-04-06 PROCEDURE — 99214 OFFICE O/P EST MOD 30 MIN: CPT | Performed by: PHYSICIAN ASSISTANT

## 2021-04-06 PROCEDURE — 87070 CULTURE OTHR SPECIMN AEROBIC: CPT | Performed by: PHYSICIAN ASSISTANT

## 2021-04-06 PROCEDURE — 87186 SC STD MICRODIL/AGAR DIL: CPT | Performed by: PHYSICIAN ASSISTANT

## 2021-04-06 PROCEDURE — 87205 SMEAR GRAM STAIN: CPT | Performed by: PHYSICIAN ASSISTANT

## 2021-04-06 RX ORDER — CEPHALEXIN 500 MG/1
500 CAPSULE ORAL EVERY 8 HOURS SCHEDULED
Qty: 30 CAPSULE | Refills: 0 | Status: SHIPPED | OUTPATIENT
Start: 2021-04-06 | End: 2021-04-16

## 2021-04-06 NOTE — PROGRESS NOTES
Assessment/Plan:    Problem List Items Addressed This Visit     None      Visit Diagnoses     Abscess of finger of left hand    -  Primary    Relevant Medications    cephalexin (KEFLEX) 500 mg capsule    Other Relevant Orders    Wound culture and Gram stain           Diagnoses and all orders for this visit:    Abscess of finger of left hand  -     cephalexin (KEFLEX) 500 mg capsule; Take 1 capsule (500 mg total) by mouth every 8 (eight) hours for 10 days  -     Wound culture and Gram stain; Future  -     Wound culture and Gram stain    Other orders  -     Incision and Drainage        Pt to RTO if symptoms worsen/persist        Subjective:      Patient ID: Alton Salinas is a 48 y o  male  Yvone Forward is here today complaining of a painful abscess on the tip of his L finger  He had a splinter (wooden) in the index finger last week and removed it  The following portions of the patient's history were reviewed and updated as appropriate:   He has a past medical history of Acid reflux, GERD (gastroesophageal reflux disease), TRES (obstructive sleep apnea), Panic disorder, and SVT (supraventricular tachycardia) ([facility] )  ,  does not have any pertinent problems on file  ,   has a past surgical history that includes Cyst Removal; AV node ablation; and pr lap,cholecystectomy (N/A, 9/24/2019)  ,  family history includes Diabetes in his mother and sister; Heart disease in his mother; Hypertension in his mother and sister; Kidney disease in his mother; Liver cancer in his mother; Stroke in his maternal uncle; Thyroid disease in his father and mother  ,   reports that he quit smoking about 7 years ago  He has never used smokeless tobacco  He reports previous alcohol use  He reports that he does not use drugs  ,  is allergic to metformin     Current Outpatient Medications   Medication Sig Dispense Refill    Accu-Chek FastClix Lancets MISC Test blood sugar once daily for fluctuating blood sugars 100 each 3    aspirin 81 MG tablet Take 1 tablet by mouth daily      atorvastatin (LIPITOR) 20 mg tablet Take 1 tablet (20 mg total) by mouth daily 90 tablet 0    esomeprazole (NexIUM) 20 mg capsule Take 20 mg by mouth every morning before breakfast      lisinopril (ZESTRIL) 20 mg tablet Take 1 tablet (20 mg total) by mouth daily 90 tablet 0    metFORMIN (GLUCOPHAGE-XR) 750 mg 24 hr tablet Take 1 tablet in AM for x 2 weeks, if tolerating increase to 2 tablets daily in AM 60 tablet 1    methocarbamol (ROBAXIN) 750 mg tablet Take 750 mg by mouth 4 (four) times a day as needed      omeprazole (PriLOSEC OTC) 20 MG tablet Take 1 tablet (20 mg total) by mouth daily 30 tablet 2    Blood Glucose Monitoring Suppl (Accu-Chek Joya Plus) w/Device KIT Test blood sugar once daily for fluctuating blood sugars (Patient not taking: Reported on 3/30/2021) 1 kit 0    cephalexin (KEFLEX) 500 mg capsule Take 1 capsule (500 mg total) by mouth every 8 (eight) hours for 10 days 30 capsule 0    cyclobenzaprine (FLEXERIL) 5 mg tablet Take 1 tablet (5 mg total) by mouth 2 (two) times a day as needed for muscle spasms for up to 8 doses No driving or operating heavy machinery on this medication (Patient not taking: Reported on 3/30/2021) 8 tablet 0    glucose blood (Accu-Chek Joya Plus) test strip Test blood sugar once daily for fluctuating blood sugars (Patient not taking: Reported on 3/30/2021) 100 each 3    sitaGLIPtin (JANUVIA) 50 mg tablet Take 1 tablet (50 mg total) by mouth daily (Patient not taking: Reported on 3/30/2021) 90 tablet 0     No current facility-administered medications for this visit  Review of Systems   Constitutional: Negative for activity change, appetite change, chills, diaphoresis, fatigue, fever and unexpected weight change  HENT: Negative for congestion, ear pain, postnasal drip, rhinorrhea, sinus pressure, sinus pain, sneezing, sore throat, tinnitus and voice change  Eyes: Negative for pain, redness and visual disturbance  Respiratory: Negative for cough, chest tightness, shortness of breath and wheezing  Cardiovascular: Negative for chest pain, palpitations and leg swelling  Gastrointestinal: Negative for abdominal pain, blood in stool, constipation, diarrhea, nausea and vomiting  Genitourinary: Negative for difficulty urinating, dysuria, frequency, hematuria and urgency  Musculoskeletal: Negative for arthralgias, back pain, gait problem, joint swelling, myalgias, neck pain and neck stiffness  Skin: Positive for wound (tip of L index finger)  Negative for color change, pallor and rash  Neurological: Negative for dizziness, tremors, weakness, light-headedness and headaches  Psychiatric/Behavioral: Negative for dysphoric mood, self-injury, sleep disturbance and suicidal ideas  The patient is not nervous/anxious  Objective:  Vitals:    04/06/21 1558   BP: 110/86   Pulse: 63   Temp: 97 8 °F (36 6 °C)   SpO2: 97%   Weight: 107 kg (236 lb 12 8 oz)   Height: 5' 6" (1 676 m)     Body mass index is 38 22 kg/m²  Physical Exam  Vitals signs reviewed  Constitutional:       General: He is not in acute distress  Appearance: He is well-developed  He is not diaphoretic  HENT:      Head: Normocephalic and atraumatic  Right Ear: Hearing, tympanic membrane, ear canal and external ear normal       Left Ear: Hearing, tympanic membrane, ear canal and external ear normal       Mouth/Throat:      Pharynx: Uvula midline  No oropharyngeal exudate  Eyes:      General: No scleral icterus  Right eye: No discharge  Left eye: No discharge  Conjunctiva/sclera: Conjunctivae normal    Neck:      Musculoskeletal: Neck supple  Thyroid: No thyromegaly  Vascular: No carotid bruit  Cardiovascular:      Rate and Rhythm: Normal rate and regular rhythm  Heart sounds: Normal heart sounds  No murmur  Pulmonary:      Effort: Pulmonary effort is normal  No respiratory distress        Breath sounds: Normal breath sounds  No wheezing  Abdominal:      General: Bowel sounds are normal  There is no distension  Palpations: Abdomen is soft  There is no mass  Tenderness: There is no abdominal tenderness  There is no guarding or rebound  Musculoskeletal: Normal range of motion  General: No tenderness  Lymphadenopathy:      Cervical: No cervical adenopathy  Skin:     General: Skin is warm and dry  Findings: No erythema or rash  Comments: Small abscess on tip of L index finger   Neurological:      Mental Status: He is alert and oriented to person, place, and time  Psychiatric:         Behavior: Behavior normal          Thought Content: Thought content normal          Judgment: Judgment normal              Incision and Drainage    Date/Time: 4/6/2021 4:21 PM  Performed by: Griselda Angulo PA-C  Authorized by: Griselda Angulo PA-C   Universal Protocol:  Consent: Verbal consent obtained  Consent given by: patient      Patient location:  Clinic  Location:     Type:  Abscess    Location:  Upper extremity    Upper extremity location:  L index finger  Anesthesia (see MAR for exact dosages): Anesthesia method:  None  Procedure details:     Complexity:  Simple    Needle aspiration: yes      Incision types:  Stab incision    Aspiration type: puncture aspiration      Incision depth:  Subcutaneous    Drainage:  Purulent and bloody    Wound treatment:  Wound left open  Post-procedure details:     Patient tolerance of procedure: Tolerated well, no immediate complications  Comments:      Pt instructed to use warm Epsom salt soaks TID  Tetanus vaccination is UTD

## 2021-04-09 LAB
BACTERIA WND AEROBE CULT: ABNORMAL
GRAM STN SPEC: ABNORMAL
GRAM STN SPEC: ABNORMAL

## 2021-05-10 NOTE — PROGRESS NOTES
OT DISCHARGE    Patient has Consistent attended OP OT services since start of care  Patient has attended 10 visits since start of care  Patient last missed visit/visit was on 04/01/2021  Patient has not returned to clinic for further treatment  Thank you for the referral  Please refer to patient's last assessment for most recent objective measurements

## 2021-08-12 ENCOUNTER — APPOINTMENT (OUTPATIENT)
Dept: LAB | Facility: CLINIC | Age: 56
End: 2021-08-12
Payer: MEDICARE

## 2021-08-12 DIAGNOSIS — E11.9 TYPE 2 DIABETES MELLITUS WITHOUT COMPLICATION, UNSPECIFIED WHETHER LONG TERM INSULIN USE (HCC): ICD-10-CM

## 2021-08-12 DIAGNOSIS — E78.2 MIXED HYPERLIPIDEMIA: ICD-10-CM

## 2021-08-12 LAB
ALBUMIN SERPL BCP-MCNC: 3.4 G/DL (ref 3.5–5)
ALP SERPL-CCNC: 86 U/L (ref 46–116)
ALT SERPL W P-5'-P-CCNC: 83 U/L (ref 12–78)
ANION GAP SERPL CALCULATED.3IONS-SCNC: 6 MMOL/L (ref 4–13)
AST SERPL W P-5'-P-CCNC: 33 U/L (ref 5–45)
BILIRUB DIRECT SERPL-MCNC: 0.11 MG/DL (ref 0–0.2)
BILIRUB SERPL-MCNC: 0.36 MG/DL (ref 0.2–1)
BUN SERPL-MCNC: 14 MG/DL (ref 5–25)
CALCIUM SERPL-MCNC: 8.9 MG/DL (ref 8.3–10.1)
CHLORIDE SERPL-SCNC: 107 MMOL/L (ref 100–108)
CHOLEST SERPL-MCNC: 208 MG/DL (ref 50–200)
CO2 SERPL-SCNC: 26 MMOL/L (ref 21–32)
CREAT SERPL-MCNC: 1.05 MG/DL (ref 0.6–1.3)
ERYTHROCYTE [DISTWIDTH] IN BLOOD BY AUTOMATED COUNT: 12.8 % (ref 11.6–15.1)
EST. AVERAGE GLUCOSE BLD GHB EST-MCNC: 177 MG/DL
GFR SERPL CREATININE-BSD FRML MDRD: 80 ML/MIN/1.73SQ M
GLUCOSE P FAST SERPL-MCNC: 176 MG/DL (ref 65–99)
HBA1C MFR BLD: 7.8 %
HCT VFR BLD AUTO: 44.7 % (ref 36.5–49.3)
HDLC SERPL-MCNC: 33 MG/DL
HGB BLD-MCNC: 14.6 G/DL (ref 12–17)
LDLC SERPL CALC-MCNC: 125 MG/DL (ref 0–100)
MCH RBC QN AUTO: 29.8 PG (ref 26.8–34.3)
MCHC RBC AUTO-ENTMCNC: 32.7 G/DL (ref 31.4–37.4)
MCV RBC AUTO: 91 FL (ref 82–98)
NONHDLC SERPL-MCNC: 175 MG/DL
PLATELET # BLD AUTO: 192 THOUSANDS/UL (ref 149–390)
PMV BLD AUTO: 11.7 FL (ref 8.9–12.7)
POTASSIUM SERPL-SCNC: 4.1 MMOL/L (ref 3.5–5.3)
PROT SERPL-MCNC: 7.6 G/DL (ref 6.4–8.2)
RBC # BLD AUTO: 4.9 MILLION/UL (ref 3.88–5.62)
SODIUM SERPL-SCNC: 139 MMOL/L (ref 136–145)
TRIGL SERPL-MCNC: 248 MG/DL
WBC # BLD AUTO: 6.57 THOUSAND/UL (ref 4.31–10.16)

## 2021-08-12 PROCEDURE — 80076 HEPATIC FUNCTION PANEL: CPT

## 2021-08-12 PROCEDURE — 80061 LIPID PANEL: CPT

## 2021-08-12 PROCEDURE — 36415 COLL VENOUS BLD VENIPUNCTURE: CPT

## 2021-08-12 PROCEDURE — 80048 BASIC METABOLIC PNL TOTAL CA: CPT

## 2021-08-12 PROCEDURE — 83036 HEMOGLOBIN GLYCOSYLATED A1C: CPT

## 2021-08-12 PROCEDURE — 85027 COMPLETE CBC AUTOMATED: CPT

## 2021-09-05 ENCOUNTER — APPOINTMENT (EMERGENCY)
Dept: RADIOLOGY | Facility: HOSPITAL | Age: 56
End: 2021-09-05
Payer: COMMERCIAL

## 2021-09-05 ENCOUNTER — HOSPITAL ENCOUNTER (EMERGENCY)
Facility: HOSPITAL | Age: 56
Discharge: HOME | End: 2021-09-05
Attending: EMERGENCY MEDICINE
Payer: COMMERCIAL

## 2021-09-05 VITALS
OXYGEN SATURATION: 97 % | HEART RATE: 57 BPM | DIASTOLIC BLOOD PRESSURE: 85 MMHG | TEMPERATURE: 97 F | RESPIRATION RATE: 18 BRPM | SYSTOLIC BLOOD PRESSURE: 147 MMHG

## 2021-09-05 DIAGNOSIS — M54.50 LUMBAR PAIN: ICD-10-CM
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
## 2021-09-05 DIAGNOSIS — M54.2 NECK PAIN: Primary | ICD-10-CM

## 2021-09-05 PROCEDURE — 99284 EMERGENCY DEPT VISIT MOD MDM: CPT | Performed by: EMERGENCY MEDICINE

## 2021-09-05 PROCEDURE — 72100 X-RAY EXAM L-S SPINE 2/3 VWS: CPT

## 2021-09-05 PROCEDURE — 99284 EMERGENCY DEPT VISIT MOD MDM: CPT

## 2021-09-05 PROCEDURE — 96372 THER/PROPH/DIAG INJ SC/IM: CPT

## 2021-09-05 PROCEDURE — 72040 X-RAY EXAM NECK SPINE 2-3 VW: CPT

## 2021-09-05 RX ORDER — KETOROLAC TROMETHAMINE 30 MG/ML
15 INJECTION, SOLUTION INTRAMUSCULAR; INTRAVENOUS ONCE
Status: COMPLETED | OUTPATIENT
Start: 2021-09-05 | End: 2021-09-05

## 2021-09-05 RX ORDER — CYCLOBENZAPRINE HCL 5 MG
5 TABLET ORAL 3 TIMES DAILY PRN
Qty: 10 TABLET | Refills: 0 | Status: SHIPPED | OUTPATIENT
Start: 2021-09-05

## 2021-09-05 RX ADMIN — KETOROLAC TROMETHAMINE 15 MG: 30 INJECTION, SOLUTION INTRAMUSCULAR; INTRAVENOUS at 21:52

## 2021-09-06 NOTE — DISCHARGE INSTRUCTIONS
Follow up with the back and spine specialist    Return if symptoms worsen or if new symptoms develop
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
## 2021-09-10 ENCOUNTER — TELEPHONE (OUTPATIENT)
Dept: PHYSICAL THERAPY | Facility: OTHER | Age: 56
End: 2021-09-10
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
## 2021-09-14 ENCOUNTER — TELEPHONE (OUTPATIENT)
Dept: SURGERY | Facility: HOSPITAL | Age: 56
End: 2021-09-14

## 2021-09-16 ENCOUNTER — TELEPHONE (OUTPATIENT)
Dept: PHYSICAL THERAPY | Facility: OTHER | Age: 56
End: 2021-09-16
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
## 2021-11-01 ENCOUNTER — EVALUATION (OUTPATIENT)
Dept: PHYSICAL THERAPY | Facility: CLINIC | Age: 56
End: 2021-11-01
Payer: COMMERCIAL

## 2021-11-01 DIAGNOSIS — M54.50 CHRONIC BILATERAL LOW BACK PAIN WITHOUT SCIATICA: ICD-10-CM

## 2021-11-01 DIAGNOSIS — M54.2 CERVICAL PAIN (NECK): Primary | ICD-10-CM

## 2021-11-01 DIAGNOSIS — G89.29 CHRONIC BILATERAL LOW BACK PAIN WITHOUT SCIATICA: ICD-10-CM

## 2021-11-01 PROCEDURE — 97163 PT EVAL HIGH COMPLEX 45 MIN: CPT

## 2021-11-08 ENCOUNTER — OFFICE VISIT (OUTPATIENT)
Dept: PHYSICAL THERAPY | Facility: CLINIC | Age: 56
End: 2021-11-08
Payer: COMMERCIAL

## 2021-11-08 DIAGNOSIS — G89.29 CHRONIC BILATERAL LOW BACK PAIN WITHOUT SCIATICA: ICD-10-CM

## 2021-11-08 DIAGNOSIS — M54.2 CERVICAL PAIN (NECK): Primary | ICD-10-CM

## 2021-11-08 DIAGNOSIS — M54.50 CHRONIC BILATERAL LOW BACK PAIN WITHOUT SCIATICA: ICD-10-CM

## 2021-11-08 PROCEDURE — 97112 NEUROMUSCULAR REEDUCATION: CPT

## 2021-11-08 PROCEDURE — 97110 THERAPEUTIC EXERCISES: CPT

## 2021-11-11 ENCOUNTER — OFFICE VISIT (OUTPATIENT)
Dept: PHYSICAL THERAPY | Facility: CLINIC | Age: 56
End: 2021-11-11
Payer: COMMERCIAL

## 2021-11-11 DIAGNOSIS — M54.50 CHRONIC BILATERAL LOW BACK PAIN WITHOUT SCIATICA: ICD-10-CM

## 2021-11-11 DIAGNOSIS — M54.2 CERVICAL PAIN (NECK): Primary | ICD-10-CM

## 2021-11-11 DIAGNOSIS — G89.29 CHRONIC BILATERAL LOW BACK PAIN WITHOUT SCIATICA: ICD-10-CM

## 2021-11-11 PROCEDURE — 97110 THERAPEUTIC EXERCISES: CPT

## 2021-11-16 ENCOUNTER — OFFICE VISIT (OUTPATIENT)
Dept: PHYSICAL THERAPY | Facility: CLINIC | Age: 56
End: 2021-11-16
Payer: COMMERCIAL

## 2021-11-16 DIAGNOSIS — M54.2 CERVICAL PAIN (NECK): Primary | ICD-10-CM

## 2021-11-16 DIAGNOSIS — M54.50 CHRONIC BILATERAL LOW BACK PAIN WITHOUT SCIATICA: ICD-10-CM

## 2021-11-16 DIAGNOSIS — G89.29 CHRONIC BILATERAL LOW BACK PAIN WITHOUT SCIATICA: ICD-10-CM

## 2021-11-16 PROCEDURE — 97140 MANUAL THERAPY 1/> REGIONS: CPT

## 2021-11-16 PROCEDURE — 97110 THERAPEUTIC EXERCISES: CPT

## 2021-11-18 ENCOUNTER — OFFICE VISIT (OUTPATIENT)
Dept: PHYSICAL THERAPY | Facility: CLINIC | Age: 56
End: 2021-11-18
Payer: COMMERCIAL

## 2021-11-18 DIAGNOSIS — G89.29 CHRONIC BILATERAL LOW BACK PAIN WITHOUT SCIATICA: ICD-10-CM

## 2021-11-18 DIAGNOSIS — M54.50 CHRONIC BILATERAL LOW BACK PAIN WITHOUT SCIATICA: ICD-10-CM

## 2021-11-18 DIAGNOSIS — M54.2 CERVICAL PAIN (NECK): Primary | ICD-10-CM

## 2021-11-18 PROCEDURE — 97110 THERAPEUTIC EXERCISES: CPT

## 2021-11-23 ENCOUNTER — OFFICE VISIT (OUTPATIENT)
Dept: PHYSICAL THERAPY | Facility: CLINIC | Age: 56
End: 2021-11-23
Payer: COMMERCIAL

## 2021-11-23 DIAGNOSIS — G89.29 CHRONIC BILATERAL LOW BACK PAIN WITHOUT SCIATICA: ICD-10-CM

## 2021-11-23 DIAGNOSIS — M54.50 CHRONIC BILATERAL LOW BACK PAIN WITHOUT SCIATICA: ICD-10-CM

## 2021-11-23 DIAGNOSIS — M54.2 CERVICAL PAIN (NECK): Primary | ICD-10-CM

## 2021-11-23 PROCEDURE — 97110 THERAPEUTIC EXERCISES: CPT

## 2021-11-23 PROCEDURE — 97140 MANUAL THERAPY 1/> REGIONS: CPT

## 2021-11-30 ENCOUNTER — APPOINTMENT (OUTPATIENT)
Dept: PHYSICAL THERAPY | Facility: CLINIC | Age: 56
End: 2021-11-30
Payer: COMMERCIAL

## 2021-12-02 ENCOUNTER — OFFICE VISIT (OUTPATIENT)
Dept: PHYSICAL THERAPY | Facility: CLINIC | Age: 56
End: 2021-12-02
Payer: COMMERCIAL

## 2021-12-02 ENCOUNTER — APPOINTMENT (OUTPATIENT)
Dept: PHYSICAL THERAPY | Facility: CLINIC | Age: 56
End: 2021-12-02
Payer: COMMERCIAL

## 2021-12-02 DIAGNOSIS — M54.2 CERVICAL PAIN (NECK): Primary | ICD-10-CM

## 2021-12-02 DIAGNOSIS — M54.50 CHRONIC BILATERAL LOW BACK PAIN WITHOUT SCIATICA: ICD-10-CM

## 2021-12-02 DIAGNOSIS — G89.29 CHRONIC BILATERAL LOW BACK PAIN WITHOUT SCIATICA: ICD-10-CM

## 2021-12-02 PROCEDURE — 97110 THERAPEUTIC EXERCISES: CPT

## 2021-12-09 RX ORDER — MELOXICAM 7.5 MG/1
7.5 TABLET ORAL 2 TIMES DAILY
COMMUNITY
Start: 2021-09-17

## 2021-12-09 RX ORDER — TAMSULOSIN HYDROCHLORIDE 0.4 MG/1
1 CAPSULE ORAL DAILY
COMMUNITY
Start: 2021-08-02

## 2021-12-09 RX ORDER — METHOCARBAMOL 750 MG/1
TABLET, FILM COATED ORAL
COMMUNITY
Start: 2021-09-17

## 2021-12-13 ENCOUNTER — TRANSCRIBE ORDERS (OUTPATIENT)
Dept: SURGERY | Facility: CLINIC | Age: 56
End: 2021-12-13

## 2021-12-13 ENCOUNTER — CONSULT (OUTPATIENT)
Dept: SURGERY | Facility: CLINIC | Age: 56
End: 2021-12-13
Payer: MEDICARE

## 2021-12-13 VITALS
DIASTOLIC BLOOD PRESSURE: 80 MMHG | HEART RATE: 73 BPM | TEMPERATURE: 98.3 F | SYSTOLIC BLOOD PRESSURE: 126 MMHG | WEIGHT: 230 LBS | RESPIRATION RATE: 14 BRPM | HEIGHT: 66 IN | BODY MASS INDEX: 36.96 KG/M2

## 2021-12-13 DIAGNOSIS — L72.3 SEBACEOUS CYST: Primary | ICD-10-CM

## 2021-12-13 DIAGNOSIS — L72.3 SEBACEOUS CYST: ICD-10-CM

## 2021-12-13 DIAGNOSIS — D17.21 LIPOMA OF RIGHT FOREARM: Primary | ICD-10-CM

## 2021-12-13 DIAGNOSIS — D17.24 LIPOMA OF LEFT THIGH: ICD-10-CM

## 2021-12-13 PROCEDURE — 11406 EXC TR-EXT B9+MARG >4.0 CM: CPT | Performed by: SURGERY

## 2021-12-13 PROCEDURE — 88304 TISSUE EXAM BY PATHOLOGIST: CPT | Performed by: SPECIALIST

## 2021-12-13 PROCEDURE — 99213 OFFICE O/P EST LOW 20 MIN: CPT | Performed by: SURGERY

## 2021-12-13 PROCEDURE — 11404 EXC TR-EXT B9+MARG 3.1-4 CM: CPT | Performed by: SURGERY

## 2021-12-20 ENCOUNTER — TELEPHONE (OUTPATIENT)
Dept: SURGERY | Facility: CLINIC | Age: 56
End: 2021-12-20

## 2021-12-27 ENCOUNTER — PROCEDURE VISIT (OUTPATIENT)
Dept: SURGERY | Facility: CLINIC | Age: 56
End: 2021-12-27

## 2021-12-27 VITALS
BODY MASS INDEX: 37.28 KG/M2 | DIASTOLIC BLOOD PRESSURE: 80 MMHG | TEMPERATURE: 97.9 F | WEIGHT: 232 LBS | HEART RATE: 66 BPM | RESPIRATION RATE: 16 BRPM | HEIGHT: 66 IN | SYSTOLIC BLOOD PRESSURE: 146 MMHG

## 2021-12-27 DIAGNOSIS — D17.21 LIPOMA OF RIGHT FOREARM: ICD-10-CM

## 2021-12-27 DIAGNOSIS — E11.9 TYPE 2 DIABETES MELLITUS WITHOUT COMPLICATION, WITHOUT LONG-TERM CURRENT USE OF INSULIN (HCC): ICD-10-CM

## 2021-12-27 DIAGNOSIS — L82.1 SEBORRHEIC KERATOSIS OF SCALP: ICD-10-CM

## 2021-12-27 DIAGNOSIS — D17.24 LIPOMA OF LEFT THIGH: ICD-10-CM

## 2021-12-27 DIAGNOSIS — D17.1 LIPOMA OF ABDOMINAL WALL: Primary | ICD-10-CM

## 2021-12-27 PROCEDURE — 99024 POSTOP FOLLOW-UP VISIT: CPT | Performed by: SURGERY

## 2022-01-03 ENCOUNTER — APPOINTMENT (OUTPATIENT)
Dept: PHYSICAL THERAPY | Facility: CLINIC | Age: 57
End: 2022-01-03
Payer: COMMERCIAL

## 2022-01-24 ENCOUNTER — EVALUATION (OUTPATIENT)
Dept: PHYSICAL THERAPY | Facility: CLINIC | Age: 57
End: 2022-01-24
Payer: COMMERCIAL

## 2022-01-24 DIAGNOSIS — G89.29 CHRONIC BILATERAL LOW BACK PAIN WITHOUT SCIATICA: Primary | ICD-10-CM

## 2022-01-24 DIAGNOSIS — M54.50 CHRONIC BILATERAL LOW BACK PAIN WITHOUT SCIATICA: Primary | ICD-10-CM

## 2022-01-24 PROCEDURE — 97110 THERAPEUTIC EXERCISES: CPT | Performed by: PHYSICAL THERAPIST

## 2022-01-24 PROCEDURE — 97112 NEUROMUSCULAR REEDUCATION: CPT | Performed by: PHYSICAL THERAPIST

## 2022-01-24 PROCEDURE — 97161 PT EVAL LOW COMPLEX 20 MIN: CPT | Performed by: PHYSICAL THERAPIST
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
## 2022-01-27 ENCOUNTER — OFFICE VISIT (OUTPATIENT)
Dept: PHYSICAL THERAPY | Facility: CLINIC | Age: 57
End: 2022-01-27
Payer: COMMERCIAL

## 2022-01-27 DIAGNOSIS — M54.50 CHRONIC BILATERAL LOW BACK PAIN WITHOUT SCIATICA: Primary | ICD-10-CM

## 2022-01-27 DIAGNOSIS — G89.29 CHRONIC BILATERAL LOW BACK PAIN WITHOUT SCIATICA: Primary | ICD-10-CM

## 2022-01-27 PROCEDURE — 97110 THERAPEUTIC EXERCISES: CPT

## 2022-01-27 PROCEDURE — 97112 NEUROMUSCULAR REEDUCATION: CPT
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
## 2022-01-31 ENCOUNTER — OFFICE VISIT (OUTPATIENT)
Dept: PHYSICAL THERAPY | Facility: CLINIC | Age: 57
End: 2022-01-31
Payer: COMMERCIAL

## 2022-01-31 DIAGNOSIS — M54.50 CHRONIC BILATERAL LOW BACK PAIN WITHOUT SCIATICA: Primary | ICD-10-CM

## 2022-01-31 DIAGNOSIS — G89.29 CHRONIC BILATERAL LOW BACK PAIN WITHOUT SCIATICA: Primary | ICD-10-CM

## 2022-01-31 PROCEDURE — 97110 THERAPEUTIC EXERCISES: CPT
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
## 2022-02-03 ENCOUNTER — HOSPITAL ENCOUNTER (EMERGENCY)
Facility: HOSPITAL | Age: 57
Discharge: HOME | End: 2022-02-03
Attending: EMERGENCY MEDICINE | Admitting: EMERGENCY MEDICINE
Payer: MEDICARE

## 2022-02-03 ENCOUNTER — APPOINTMENT (EMERGENCY)
Dept: CT IMAGING | Facility: HOSPITAL | Age: 57
End: 2022-02-03
Payer: MEDICARE

## 2022-02-03 VITALS
BODY MASS INDEX: 36.96 KG/M2 | HEART RATE: 62 BPM | DIASTOLIC BLOOD PRESSURE: 83 MMHG | SYSTOLIC BLOOD PRESSURE: 139 MMHG | HEIGHT: 66 IN | TEMPERATURE: 97.6 F | WEIGHT: 230 LBS | OXYGEN SATURATION: 99 % | RESPIRATION RATE: 18 BRPM

## 2022-02-03 DIAGNOSIS — N13.2 URETERAL STONE WITH HYDRONEPHROSIS: Primary | ICD-10-CM

## 2022-02-03 LAB
ALBUMIN SERPL BCP-MCNC: 3.9 G/DL (ref 3.5–5)
ALP SERPL-CCNC: 69 U/L (ref 34–104)
ALT SERPL W P-5'-P-CCNC: 44 U/L (ref 7–52)
ANION GAP SERPL CALCULATED.3IONS-SCNC: 5 MMOL/L (ref 4–13)
AST SERPL W P-5'-P-CCNC: 20 U/L (ref 13–39)
BACTERIA UR QL AUTO: ABNORMAL /HPF
BASOPHILS # BLD AUTO: 0.09 THOUSANDS/ΜL (ref 0–0.1)
BASOPHILS NFR BLD AUTO: 2 % (ref 0–1)
BILIRUB SERPL-MCNC: 0.4 MG/DL (ref 0.2–1)
BILIRUB UR QL STRIP: NEGATIVE
BUN SERPL-MCNC: 13 MG/DL (ref 5–25)
CALCIUM SERPL-MCNC: 9.1 MG/DL (ref 8.4–10.2)
CHLORIDE SERPL-SCNC: 107 MMOL/L (ref 96–108)
CLARITY UR: ABNORMAL
CO2 SERPL-SCNC: 29 MMOL/L (ref 21–32)
COLOR UR: YELLOW
CREAT SERPL-MCNC: 1.11 MG/DL (ref 0.6–1.3)
EOSINOPHIL # BLD AUTO: 0.31 THOUSAND/ΜL (ref 0–0.61)
EOSINOPHIL NFR BLD AUTO: 5 % (ref 0–6)
ERYTHROCYTE [DISTWIDTH] IN BLOOD BY AUTOMATED COUNT: 12.5 % (ref 11.6–15.1)
GFR SERPL CREATININE-BSD FRML MDRD: 74 ML/MIN/1.73SQ M
GLUCOSE SERPL-MCNC: 183 MG/DL (ref 65–140)
GLUCOSE UR STRIP-MCNC: NEGATIVE MG/DL
HCT VFR BLD AUTO: 44.2 % (ref 36.5–49.3)
HGB BLD-MCNC: 14.7 G/DL (ref 12–17)
HGB UR QL STRIP.AUTO: ABNORMAL
IMM GRANULOCYTES # BLD AUTO: 0.02 THOUSAND/UL (ref 0–0.2)
IMM GRANULOCYTES NFR BLD AUTO: 0 % (ref 0–2)
KETONES UR STRIP-MCNC: NEGATIVE MG/DL
LEUKOCYTE ESTERASE UR QL STRIP: NEGATIVE
LIPASE SERPL-CCNC: 330 U/L (ref 11–82)
LYMPHOCYTES # BLD AUTO: 1.84 THOUSANDS/ΜL (ref 0.6–4.47)
LYMPHOCYTES NFR BLD AUTO: 30 % (ref 14–44)
MCH RBC QN AUTO: 29.6 PG (ref 26.8–34.3)
MCHC RBC AUTO-ENTMCNC: 33.3 G/DL (ref 31.4–37.4)
MCV RBC AUTO: 89 FL (ref 82–98)
MONOCYTES # BLD AUTO: 0.89 THOUSAND/ΜL (ref 0.17–1.22)
MONOCYTES NFR BLD AUTO: 15 % (ref 4–12)
MUCOUS THREADS UR QL AUTO: ABNORMAL
NEUTROPHILS # BLD AUTO: 2.91 THOUSANDS/ΜL (ref 1.85–7.62)
NEUTS SEG NFR BLD AUTO: 48 % (ref 43–75)
NITRITE UR QL STRIP: NEGATIVE
NON-SQ EPI CELLS URNS QL MICRO: ABNORMAL /HPF
NRBC BLD AUTO-RTO: 0 /100 WBCS
PH UR STRIP.AUTO: 5.5 [PH]
PLATELET # BLD AUTO: 185 THOUSANDS/UL (ref 149–390)
PMV BLD AUTO: 10.5 FL (ref 8.9–12.7)
POTASSIUM SERPL-SCNC: 4.1 MMOL/L (ref 3.5–5.3)
PROT SERPL-MCNC: 7.1 G/DL (ref 6.4–8.4)
PROT UR STRIP-MCNC: NEGATIVE MG/DL
RBC # BLD AUTO: 4.96 MILLION/UL (ref 3.88–5.62)
RBC #/AREA URNS AUTO: ABNORMAL /HPF
SODIUM SERPL-SCNC: 141 MMOL/L (ref 135–147)
SP GR UR STRIP.AUTO: 1.02 (ref 1–1.03)
UROBILINOGEN UR QL STRIP.AUTO: 0.2 E.U./DL
WBC # BLD AUTO: 6.06 THOUSAND/UL (ref 4.31–10.16)
WBC #/AREA URNS AUTO: ABNORMAL /HPF

## 2022-02-03 PROCEDURE — 80053 COMPREHEN METABOLIC PANEL: CPT | Performed by: PHYSICIAN ASSISTANT

## 2022-02-03 PROCEDURE — 81001 URINALYSIS AUTO W/SCOPE: CPT | Performed by: PHYSICIAN ASSISTANT

## 2022-02-03 PROCEDURE — 96374 THER/PROPH/DIAG INJ IV PUSH: CPT

## 2022-02-03 PROCEDURE — 99284 EMERGENCY DEPT VISIT MOD MDM: CPT

## 2022-02-03 PROCEDURE — 74176 CT ABD & PELVIS W/O CONTRAST: CPT

## 2022-02-03 PROCEDURE — 99284 EMERGENCY DEPT VISIT MOD MDM: CPT | Performed by: PHYSICIAN ASSISTANT

## 2022-02-03 PROCEDURE — 83690 ASSAY OF LIPASE: CPT | Performed by: PHYSICIAN ASSISTANT

## 2022-02-03 PROCEDURE — 85025 COMPLETE CBC W/AUTO DIFF WBC: CPT | Performed by: PHYSICIAN ASSISTANT

## 2022-02-03 PROCEDURE — 96361 HYDRATE IV INFUSION ADD-ON: CPT

## 2022-02-03 PROCEDURE — 36415 COLL VENOUS BLD VENIPUNCTURE: CPT | Performed by: PHYSICIAN ASSISTANT

## 2022-02-03 PROCEDURE — 81003 URINALYSIS AUTO W/O SCOPE: CPT | Performed by: PHYSICIAN ASSISTANT

## 2022-02-03 RX ORDER — KETOROLAC TROMETHAMINE 30 MG/ML
30 INJECTION, SOLUTION INTRAMUSCULAR; INTRAVENOUS ONCE
Status: COMPLETED | OUTPATIENT
Start: 2022-02-03 | End: 2022-02-03

## 2022-02-03 RX ORDER — OXYCODONE HYDROCHLORIDE 5 MG/1
5 TABLET ORAL EVERY 4 HOURS PRN
Qty: 5 TABLET | Refills: 0 | Status: SHIPPED | OUTPATIENT
Start: 2022-02-03 | End: 2022-02-13

## 2022-02-03 RX ADMIN — SODIUM CHLORIDE 1000 ML: 0.9 INJECTION, SOLUTION INTRAVENOUS at 10:54

## 2022-02-03 RX ADMIN — KETOROLAC TROMETHAMINE 30 MG: 30 INJECTION, SOLUTION INTRAMUSCULAR; INTRAVENOUS at 10:53

## 2022-02-03 NOTE — ED PROVIDER NOTES
History  Chief Complaint   Patient presents with    Flank Pain     right sided flank pain  began around an hour ago  78-year-old male presents to the emergency department seeing evaluation for severe sudden-onset right-sided flank pain that began approximately 1 hour prior to arrival   Patient appears mildly distressed due to his pain  He indicates he has right flank pain radiating around his right side into his groin  He states that he occasionally has waves of nausea associated with this pain  He is awake alert oriented  Denies any injury or trauma  No reported vomiting  Allergies reviewed          Prior to Admission Medications   Prescriptions Last Dose Informant Patient Reported? Taking?    Accu-Chek FastClix Lancets MISC  Self No No   Sig: Test blood sugar once daily for fluctuating blood sugars   Blood Glucose Monitoring Suppl (Accu-Chek Joya Plus) w/Device KIT  Self No No   Sig: Test blood sugar once daily for fluctuating blood sugars   Patient not taking: Reported on 3/30/2021   aspirin 81 MG tablet  Self Yes No   Sig: Take 1 tablet by mouth daily   atorvastatin (LIPITOR) 20 mg tablet  Self No No   Sig: Take 1 tablet (20 mg total) by mouth daily   Patient not taking: Reported on 12/13/2021    cyclobenzaprine (FLEXERIL) 5 mg tablet  Self No No   Sig: Take 1 tablet (5 mg total) by mouth 3 (three) times a day as needed for muscle spasms   Patient not taking: Reported on 12/13/2021    esomeprazole (NexIUM) 20 mg capsule  Self Yes No   Sig: Take 20 mg by mouth every morning before breakfast   glucose blood (Accu-Chek Joya Plus) test strip  Self No No   Sig: Test blood sugar once daily for fluctuating blood sugars   Patient not taking: Reported on 3/30/2021   lisinopril (ZESTRIL) 20 mg tablet  Self No No   Sig: Take 1 tablet (20 mg total) by mouth daily   Patient not taking: Reported on 12/27/2021    meloxicam (MOBIC) 7 5 mg tablet  Self Yes No   Sig: Take 7 5 mg by mouth 2 (two) times a day metFORMIN (GLUCOPHAGE-XR) 750 mg 24 hr tablet  Self No No   Sig: Take 1 tablet in AM for x 2 weeks, if tolerating increase to 2 tablets daily in AM   methocarbamol (ROBAXIN) 750 mg tablet  Self Yes No   Sig: take 1 tablet by mouth at bedtime if needed for BACK PAIN   Patient not taking: Reported on 2021   omeprazole (PriLOSEC OTC) 20 MG tablet  Self No No   Sig: Take 1 tablet (20 mg total) by mouth daily   sitaGLIPtin (JANUVIA) 50 mg tablet  Self No No   Sig: Take 1 tablet (50 mg total) by mouth daily   Patient not taking: Reported on 3/30/2021   tamsulosin (FLOMAX) 0 4 mg  Self Yes No   Sig: Take 1 capsule by mouth Daily      Facility-Administered Medications: None       Past Medical History:   Diagnosis Date    Acid reflux     Diabetes mellitus (HCC)     GERD (gastroesophageal reflux disease)     Hypertension     TRES (obstructive sleep apnea)     Panic disorder     SVT (supraventricular tachycardia) (HCC)        Past Surgical History:   Procedure Laterality Date    AV NODE ABLATION      CARDIAC SURGERY      CYST REMOVAL      OH LAP,CHOLECYSTECTOMY N/A 2019    Procedure: CHOLECYSTECTOMY LAPAROSCOPIC;  Surgeon: Carolyne Dejesus DO;  Location: [facility];  Service: General       Family History   Problem Relation Age of Onset    Diabetes Mother     Kidney disease Mother     Liver cancer Mother     Heart disease Mother     Hypertension Mother     Thyroid disease Mother     Diabetes Sister     Hypertension Sister     Thyroid disease Father     Stroke Maternal Uncle      I have reviewed and agree with the history as documented      E-Cigarette/Vaping    E-Cigarette Use Never User      E-Cigarette/Vaping Substances     Social History     Tobacco Use    Smoking status: Former Smoker     Quit date: 2013     Years since quittin 2    Smokeless tobacco: Never Used   Vaping Use    Vaping Use: Never used   Substance Use Topics    Alcohol use: Yes     Comment: social    Drug use: Never       Review of Systems   Constitutional: Negative for chills, fatigue and fever  HENT: Negative for congestion, ear pain, rhinorrhea, sinus pressure, sneezing and sore throat  Eyes: Negative for pain and discharge  Respiratory: Negative for cough, choking, chest tightness, shortness of breath and wheezing  Cardiovascular: Negative for chest pain and palpitations  Gastrointestinal: Positive for nausea  Negative for abdominal pain, constipation, diarrhea and vomiting  Genitourinary: Positive for flank pain  Negative for difficulty urinating and dysuria  Musculoskeletal: Negative for back pain, gait problem, neck pain and neck stiffness  Neurological: Negative for dizziness, light-headedness and headaches  All other systems reviewed and are negative  Physical Exam  Physical Exam  Vitals and nursing note reviewed  Constitutional:       General: He is not in acute distress  Appearance: He is well-developed  He is not ill-appearing  HENT:      Head: Normocephalic and atraumatic  Right Ear: External ear normal       Left Ear: External ear normal       Nose: Nose normal    Eyes:      Pupils: Pupils are equal, round, and reactive to light  Cardiovascular:      Rate and Rhythm: Normal rate and regular rhythm  Heart sounds: Normal heart sounds  No murmur heard  No friction rub  No gallop  Pulmonary:      Effort: Pulmonary effort is normal  No respiratory distress  Breath sounds: Normal breath sounds  No stridor  No wheezing or rales  Abdominal:      General: Bowel sounds are normal  There is no distension  Palpations: Abdomen is soft  Tenderness: There is abdominal tenderness in the right lower quadrant  There is right CVA tenderness  There is no guarding  Musculoskeletal:         General: No tenderness  Normal range of motion  Cervical back: Normal range of motion and neck supple  Skin:     General: Skin is warm        Capillary Refill: Capillary refill takes less than 2 seconds  Neurological:      Mental Status: He is alert and oriented to person, place, and time  Psychiatric:         Behavior: Behavior is cooperative           Vital Signs  ED Triage Vitals [02/03/22 0944]   Temperature Pulse Respirations Blood Pressure SpO2   97 6 °F (36 4 °C) 72 16 131/72 97 %      Temp Source Heart Rate Source Patient Position - Orthostatic VS BP Location FiO2 (%)   Temporal Monitor Sitting Right arm --      Pain Score       7           Vitals:    02/03/22 0944 02/03/22 1137   BP: 131/72 139/83   Pulse: 72 62   Patient Position - Orthostatic VS: Sitting Sitting         Visual Acuity      ED Medications  Medications   ketorolac (TORADOL) injection 30 mg (30 mg Intravenous Given 2/3/22 1053)   sodium chloride 0 9 % bolus 1,000 mL (0 mL Intravenous Stopped 2/3/22 1138)       Diagnostic Studies  Results Reviewed     Procedure Component Value Units Date/Time    Urine Microscopic [[id number]]  (Abnormal) Collected: 02/03/22 1028    Lab Status: Final result Specimen: Urine Updated: 02/03/22 1048     RBC, UA 20-30 /hpf      WBC, UA 0-1 /hpf      Epithelial Cells Occasional /hpf      Bacteria, UA None Seen /hpf      MUCUS THREADS Moderate    Comprehensive metabolic panel [[id number]]  (Abnormal) Collected: 02/03/22 1014    Lab Status: Final result Specimen: Blood from Arm, Right Updated: 02/03/22 1046     Sodium 141 mmol/L      Potassium 4 1 mmol/L      Chloride 107 mmol/L      CO2 29 mmol/L      ANION GAP 5 mmol/L      BUN 13 mg/dL      Creatinine 1 11 mg/dL      Glucose 183 mg/dL      Calcium 9 1 mg/dL      AST 20 U/L      ALT 44 U/L      Alkaline Phosphatase 69 U/L      Total Protein 7 1 g/dL      Albumin 3 9 g/dL      Total Bilirubin 0 40 mg/dL      eGFR 74 ml/min/1 73sq m     Narrative:      Eva guidelines for Chronic Kidney Disease (CKD):     Stage 1 with normal or high GFR (GFR > 90 mL/min/1 73 square meters)    Stage 2 Mild CKD (GFR = 60-89 mL/min/1 73 square meters)    Stage 3A Moderate CKD (GFR = 45-59 mL/min/1 73 square meters)    Stage 3B Moderate CKD (GFR = 30-44 mL/min/1 73 square meters)    Stage 4 Severe CKD (GFR = 15-29 mL/min/1 73 square meters)    Stage 5 End Stage CKD (GFR <15 mL/min/1 73 square meters)  Note: GFR calculation is accurate only with a steady state creatinine    Lipase [[id number]]  (Abnormal) Collected: 02/03/22 1014    Lab Status: Final result Specimen: Blood from Arm, Right Updated: 02/03/22 1046     Lipase 330 u/L     UA w Reflex to Microscopic w Reflex to Culture [[id number]]  (Abnormal) Collected: 02/03/22 1028    Lab Status: Final result Specimen: Urine Updated: 02/03/22 1033     Color, UA Yellow     Clarity, UA Slightly Cloudy     Specific Gravity, UA 1 025     pH, UA 5 5     Leukocytes, UA Negative     Nitrite, UA Negative     Protein, UA Negative mg/dl      Glucose, UA Negative mg/dl      Ketones, UA Negative mg/dl      Urobilinogen, UA 0 2 E U /dl      Bilirubin, UA Negative     Blood, UA 3+    CBC and differential [[id number]]  (Abnormal) Collected: 02/03/22 1014    Lab Status: Final result Specimen: Blood from Arm, Right Updated: 02/03/22 1028     WBC 6 06 Thousand/uL      RBC 4 96 Million/uL      Hemoglobin 14 7 g/dL      Hematocrit 44 2 %      MCV 89 fL      MCH 29 6 pg      MCHC 33 3 g/dL      RDW 12 5 %      MPV 10 5 fL      Platelets 316 Thousands/uL      nRBC 0 /100 WBCs      Neutrophils Relative 48 %      Immat GRANS % 0 %      Lymphocytes Relative 30 %      Monocytes Relative 15 %      Eosinophils Relative 5 %      Basophils Relative 2 %      Neutrophils Absolute 2 91 Thousands/µL      Immature Grans Absolute 0 02 Thousand/uL      Lymphocytes Absolute 1 84 Thousands/µL      Monocytes Absolute 0 89 Thousand/µL      Eosinophils Absolute 0 31 Thousand/µL      Basophils Absolute 0 09 Thousands/µL                  CT renal stone study abdomen pelvis wo contrast   Final Result by Batsheva Partida MD (02/03 1118)      1 to 2 mm right UVJ calculus with minimal right hydroureteronephrosis  Punctate nonobstructing left renal calculus  Fat-containing umbilical hernia  Workstation performed: [id number]                    Procedures  Procedures         ED Course  ED Course as of 02/03/22 1618   Thu Feb 03, 2022   1034 Blood, UA(!): 3+                                             MDM  Number of Diagnoses or Management Options  Ureteral stone with hydronephrosis  Diagnosis management comments: Patient found have right-sided ureteral stone with mild hydronephrosis  I suspect a kidney stone will pass on its own without further intervention  Patient given urine filters  Elevated lipase noted, mild nausea  It is worth noting the mild nausea is associated with waves of pain from the right flank  Low clinical suspicion for pancreatitis  Patient given strong return precautions  Patient educated regarding their diagnosis and given return and follow-up instructions  Patient was advised to returned to the ED with worsening symptoms or concerns  Patient is understanding of and in agreement with the treatment plan  There are no questions at the time of discharge         Amount and/or Complexity of Data Reviewed  Clinical lab tests: ordered and reviewed  Tests in the radiology section of CPT®: ordered and reviewed    Risk of Complications, Morbidity, and/or Mortality  Presenting problems: moderate  Diagnostic procedures: low  Management options: low    Patient Progress  Patient progress: stable      Disposition  Final diagnoses:   Ureteral stone with hydronephrosis     Time reflects when diagnosis was documented in both MDM as applicable and the Disposition within this note     Time User Action Codes Description Comment    2/3/2022 11:30 AM Viral Messing Add [N20 0] Kidney stone     2/3/2022 11:32 AM Viral Messing Remove [N20 0] Kidney stone     2/3/2022 11:32 AM Lb Stratton [N13 2] Ureteral stone with hydronephrosis       ED Disposition     ED Disposition Condition Date/Time Comment    Discharge Stable u Feb 3, 2022 11:29 AM Adalid Pack discharge to home/self care              Follow-up Information     Follow up With Specialties Details Why [facility]   [facility]  [address] [place] [address] [place] [address] Road      Clear[facility] Call   [address]  [phone]            Discharge Medication List as of 2/3/2022 11:34 AM      START taking these medications    Details   oxyCODONE (Roxicodone) 5 immediate release tablet Take 1 tablet (5 mg total) by mouth every 4 (four) hours as needed for moderate pain for up to 10 days Max Daily Amount: 30 mg, Starting u 2/3/2022, Until Sun 2/13/2022 at 2359, Normal         CONTINUE these medications which have NOT CHANGED    Details   Accu-Chek FastClix Lancets MISC Test blood sugar once daily for fluctuating blood sugars, Normal      aspirin 81 MG tablet Take 1 tablet by mouth daily, Historical Med      atorvastatin (LIPITOR) 20 mg tablet Take 1 tablet (20 mg total) by mouth daily, Starting Tue 12/1/2020, Normal      Blood Glucose Monitoring Suppl (Accu-Chek Joya Plus) w/Device KIT Test blood sugar once daily for fluctuating blood sugars, Normal      cyclobenzaprine (FLEXERIL) 5 mg tablet Take 1 tablet (5 mg total) by mouth 3 (three) times a day as needed for muscle spasms, Starting Sun 9/5/2021, Normal      esomeprazole (NexIUM) 20 mg capsule Take 20 mg by mouth every morning before breakfast, Historical Med      glucose blood (Accu-Chek Joya Plus) test strip Test blood sugar once daily for fluctuating blood sugars, Normal      lisinopril (ZESTRIL) 20 mg tablet Take 1 tablet (20 mg total) by mouth daily, Starting Tue 12/1/2020, Normal      meloxicam (MOBIC) 7 5 mg tablet Take 7 5 mg by mouth 2 (two) times a day, Starting Fri 9/17/2021, Historical Med      metFORMIN (GLUCOPHAGE-XR) 750 mg 24 hr tablet Take 1 tablet in AM for x 2 weeks, if tolerating increase to 2 tablets daily in AM, Normal      methocarbamol (ROBAXIN) 750 mg tablet take 1 tablet by mouth at bedtime if needed for BACK PAIN, Historical Med      omeprazole (PriLOSEC OTC) 20 MG tablet Take 1 tablet (20 mg total) by mouth daily, Starting Fri 10/30/2020, Normal      sitaGLIPtin (JANUVIA) 50 mg tablet Take 1 tablet (50 mg total) by mouth daily, Starting Fri 1/29/2021, Normal      tamsulosin (FLOMAX) 0 4 mg Take 1 capsule by mouth Daily, Starting Mon 8/2/2021, Historical Med             No discharge procedures on file      PDMP Review     None          ED Provider  Electronically Signed by           Aileen Zamora PA-C  02/03/22 5171

## 2022-08-25 ENCOUNTER — APPOINTMENT (OUTPATIENT)
Dept: LAB | Facility: CLINIC | Age: 57
End: 2022-08-25
Payer: MEDICARE

## 2022-08-25 DIAGNOSIS — I10 HYPERTENSION, UNSPECIFIED TYPE: ICD-10-CM

## 2022-08-25 DIAGNOSIS — E11.9 TYPE 2 DIABETES MELLITUS WITHOUT COMPLICATION, UNSPECIFIED WHETHER LONG TERM INSULIN USE (HCC): ICD-10-CM

## 2022-08-25 DIAGNOSIS — E78.2 MIXED HYPERLIPIDEMIA: ICD-10-CM

## 2022-08-25 LAB
ALBUMIN SERPL BCP-MCNC: 3.5 G/DL (ref 3.5–5)
ALP SERPL-CCNC: 76 U/L (ref 46–116)
ALT SERPL W P-5'-P-CCNC: 51 U/L (ref 12–78)
ANION GAP SERPL CALCULATED.3IONS-SCNC: 3 MMOL/L (ref 4–13)
AST SERPL W P-5'-P-CCNC: 18 U/L (ref 5–45)
BILIRUB DIRECT SERPL-MCNC: 0.13 MG/DL (ref 0–0.2)
BILIRUB SERPL-MCNC: 0.52 MG/DL (ref 0.2–1)
BUN SERPL-MCNC: 16 MG/DL (ref 5–25)
CALCIUM SERPL-MCNC: 8.9 MG/DL (ref 8.3–10.1)
CHLORIDE SERPL-SCNC: 110 MMOL/L (ref 96–108)
CO2 SERPL-SCNC: 26 MMOL/L (ref 21–32)
CREAT SERPL-MCNC: 1.14 MG/DL (ref 0.6–1.3)
ERYTHROCYTE [DISTWIDTH] IN BLOOD BY AUTOMATED COUNT: 12.9 % (ref 11.6–15.1)
EST. AVERAGE GLUCOSE BLD GHB EST-MCNC: 148 MG/DL
GFR SERPL CREATININE-BSD FRML MDRD: 71 ML/MIN/1.73SQ M
GLUCOSE P FAST SERPL-MCNC: 143 MG/DL (ref 65–99)
HBA1C MFR BLD: 6.8 %
HCT VFR BLD AUTO: 43.7 % (ref 36.5–49.3)
HGB BLD-MCNC: 14.5 G/DL (ref 12–17)
MCH RBC QN AUTO: 30 PG (ref 26.8–34.3)
MCHC RBC AUTO-ENTMCNC: 33.2 G/DL (ref 31.4–37.4)
MCV RBC AUTO: 90 FL (ref 82–98)
PLATELET # BLD AUTO: 195 THOUSANDS/UL (ref 149–390)
PMV BLD AUTO: 11 FL (ref 8.9–12.7)
POTASSIUM SERPL-SCNC: 4 MMOL/L (ref 3.5–5.3)
PROT SERPL-MCNC: 7.5 G/DL (ref 6.4–8.4)
RBC # BLD AUTO: 4.84 MILLION/UL (ref 3.88–5.62)
SODIUM SERPL-SCNC: 139 MMOL/L (ref 135–147)
WBC # BLD AUTO: 6.86 THOUSAND/UL (ref 4.31–10.16)

## 2022-08-25 PROCEDURE — 83036 HEMOGLOBIN GLYCOSYLATED A1C: CPT

## 2022-08-25 PROCEDURE — 80076 HEPATIC FUNCTION PANEL: CPT

## 2022-08-25 PROCEDURE — 80048 BASIC METABOLIC PNL TOTAL CA: CPT

## 2022-08-25 PROCEDURE — 85027 COMPLETE CBC AUTOMATED: CPT

## 2022-08-25 PROCEDURE — 36415 COLL VENOUS BLD VENIPUNCTURE: CPT

## 2022-10-04 ENCOUNTER — HOSPITAL ENCOUNTER (EMERGENCY)
Facility: HOSPITAL | Age: 57
Discharge: HOME | End: 2022-10-04
Attending: EMERGENCY MEDICINE
Payer: MEDICARE

## 2022-10-04 ENCOUNTER — APPOINTMENT (OUTPATIENT)
Dept: RADIOLOGY | Facility: HOSPITAL | Age: 57
End: 2022-10-04
Payer: MEDICARE

## 2022-10-04 VITALS
DIASTOLIC BLOOD PRESSURE: 83 MMHG | TEMPERATURE: 98.7 F | OXYGEN SATURATION: 99 % | RESPIRATION RATE: 18 BRPM | HEART RATE: 47 BPM | SYSTOLIC BLOOD PRESSURE: 162 MMHG

## 2022-10-04 DIAGNOSIS — R07.89 NON-CARDIAC CHEST PAIN: Primary | ICD-10-CM

## 2022-10-04 LAB
ALBUMIN SERPL BCP-MCNC: 4.1 G/DL (ref 3.5–5)
ALP SERPL-CCNC: 65 U/L (ref 34–104)
ALT SERPL W P-5'-P-CCNC: 32 U/L (ref 7–52)
ANION GAP SERPL CALCULATED.3IONS-SCNC: 7 MMOL/L (ref 4–13)
AST SERPL W P-5'-P-CCNC: 18 U/L (ref 13–39)
BASOPHILS # BLD AUTO: 0.07 THOUSANDS/ΜL (ref 0–0.1)
BASOPHILS NFR BLD AUTO: 1 % (ref 0–1)
BILIRUB SERPL-MCNC: 0.45 MG/DL (ref 0.2–1)
BUN SERPL-MCNC: 13 MG/DL (ref 5–25)
CALCIUM SERPL-MCNC: 9.5 MG/DL (ref 8.4–10.2)
CARDIAC TROPONIN I PNL SERPL HS: <2 NG/L
CARDIAC TROPONIN I PNL SERPL HS: <2 NG/L
CHLORIDE SERPL-SCNC: 101 MMOL/L (ref 96–108)
CO2 SERPL-SCNC: 29 MMOL/L (ref 21–32)
CREAT SERPL-MCNC: 1.01 MG/DL (ref 0.6–1.3)
EOSINOPHIL # BLD AUTO: 0.33 THOUSAND/ΜL (ref 0–0.61)
EOSINOPHIL NFR BLD AUTO: 4 % (ref 0–6)
ERYTHROCYTE [DISTWIDTH] IN BLOOD BY AUTOMATED COUNT: 12.4 % (ref 11.6–15.1)
GFR SERPL CREATININE-BSD FRML MDRD: 82 ML/MIN/1.73SQ M
GLUCOSE SERPL-MCNC: 165 MG/DL (ref 65–140)
HCT VFR BLD AUTO: 43.6 % (ref 36.5–49.3)
HGB BLD-MCNC: 14.6 G/DL (ref 12–17)
IMM GRANULOCYTES # BLD AUTO: 0.02 THOUSAND/UL (ref 0–0.2)
IMM GRANULOCYTES NFR BLD AUTO: 0 % (ref 0–2)
LIPASE SERPL-CCNC: 46 U/L (ref 11–82)
LYMPHOCYTES # BLD AUTO: 2.01 THOUSANDS/ΜL (ref 0.6–4.47)
LYMPHOCYTES NFR BLD AUTO: 27 % (ref 14–44)
MCH RBC QN AUTO: 30.1 PG (ref 26.8–34.3)
MCHC RBC AUTO-ENTMCNC: 33.5 G/DL (ref 31.4–37.4)
MCV RBC AUTO: 90 FL (ref 82–98)
MONOCYTES # BLD AUTO: 1.08 THOUSAND/ΜL (ref 0.17–1.22)
MONOCYTES NFR BLD AUTO: 15 % (ref 4–12)
NEUTROPHILS # BLD AUTO: 3.92 THOUSANDS/ΜL (ref 1.85–7.62)
NEUTS SEG NFR BLD AUTO: 53 % (ref 43–75)
NRBC BLD AUTO-RTO: 0 /100 WBCS
PLATELET # BLD AUTO: 202 THOUSANDS/UL (ref 149–390)
PMV BLD AUTO: 10.4 FL (ref 8.9–12.7)
POTASSIUM SERPL-SCNC: 4 MMOL/L (ref 3.5–5.3)
PROT SERPL-MCNC: 7.1 G/DL (ref 6.4–8.4)
RBC # BLD AUTO: 4.85 MILLION/UL (ref 3.88–5.62)
SODIUM SERPL-SCNC: 137 MMOL/L (ref 135–147)
WBC # BLD AUTO: 7.43 THOUSAND/UL (ref 4.31–10.16)

## 2022-10-04 PROCEDURE — 99285 EMERGENCY DEPT VISIT HI MDM: CPT | Performed by: EMERGENCY MEDICINE

## 2022-10-04 PROCEDURE — 36415 COLL VENOUS BLD VENIPUNCTURE: CPT | Performed by: EMERGENCY MEDICINE

## 2022-10-04 PROCEDURE — 83690 ASSAY OF LIPASE: CPT | Performed by: EMERGENCY MEDICINE

## 2022-10-04 PROCEDURE — 80053 COMPREHEN METABOLIC PANEL: CPT | Performed by: EMERGENCY MEDICINE

## 2022-10-04 PROCEDURE — 96374 THER/PROPH/DIAG INJ IV PUSH: CPT

## 2022-10-04 PROCEDURE — 93005 ELECTROCARDIOGRAM TRACING: CPT

## 2022-10-04 PROCEDURE — 85025 COMPLETE CBC W/AUTO DIFF WBC: CPT | Performed by: EMERGENCY MEDICINE

## 2022-10-04 PROCEDURE — 84484 ASSAY OF TROPONIN QUANT: CPT | Performed by: EMERGENCY MEDICINE

## 2022-10-04 PROCEDURE — 99285 EMERGENCY DEPT VISIT HI MDM: CPT

## 2022-10-04 PROCEDURE — 71045 X-RAY EXAM CHEST 1 VIEW: CPT

## 2022-10-04 RX ORDER — ALUMINA, MAGNESIA, AND SIMETHICONE 2400; 2400; 240 MG/30ML; MG/30ML; MG/30ML
10 SUSPENSION ORAL EVERY 6 HOURS PRN
Qty: 355 ML | Refills: 0 | Status: SHIPPED | OUTPATIENT
Start: 2022-10-04

## 2022-10-04 RX ORDER — MAGNESIUM HYDROXIDE/ALUMINUM HYDROXICE/SIMETHICONE 120; 1200; 1200 MG/30ML; MG/30ML; MG/30ML
30 SUSPENSION ORAL ONCE
Status: COMPLETED | OUTPATIENT
Start: 2022-10-04 | End: 2022-10-04

## 2022-10-04 RX ORDER — SUCRALFATE 1 G/1
1 TABLET ORAL 2 TIMES DAILY PRN
Qty: 40 TABLET | Refills: 0 | Status: SHIPPED | OUTPATIENT
Start: 2022-10-04

## 2022-10-04 RX ORDER — SUCRALFATE 1 G/1
1 TABLET ORAL ONCE
Status: COMPLETED | OUTPATIENT
Start: 2022-10-04 | End: 2022-10-04

## 2022-10-04 RX ORDER — FAMOTIDINE 10 MG/ML
20 INJECTION, SOLUTION INTRAVENOUS ONCE
Status: COMPLETED | OUTPATIENT
Start: 2022-10-04 | End: 2022-10-04

## 2022-10-04 RX ORDER — SODIUM CHLORIDE 9 MG/ML
3 INJECTION INTRAVENOUS
Status: DISCONTINUED | OUTPATIENT
Start: 2022-10-04 | End: 2022-10-04 | Stop reason: HOSPADM

## 2022-10-04 RX ADMIN — SUCRALFATE 1 G: 1 TABLET ORAL at 17:58

## 2022-10-04 RX ADMIN — FAMOTIDINE 20 MG: 10 INJECTION, SOLUTION INTRAVENOUS at 17:59

## 2022-10-04 RX ADMIN — ALUMINUM HYDROXIDE, MAGNESIUM HYDROXIDE, AND SIMETHICONE 30 ML: 200; 200; 20 SUSPENSION ORAL at 17:58

## 2022-10-04 NOTE — DISCHARGE INSTRUCTIONS
I believe today that your chest pain is secondary to reflux, take previously prescribed Prilosec and use Carafate Mylanta as needed for your symptoms    Follow-up with cardiology and return to care if you develop any new worsening symptoms

## 2022-10-04 NOTE — ED PROVIDER NOTES
History  Chief Complaint   Patient presents with    Chest Pain     Central chest pain onset a couple hours ago       Patient is a 41-year-old male with history of hypertension, diabetes mellitus, GERD that presents for evaluation of substernal chest pain  Patient says about an hour ago he has been having intermittent substernal chest burning that is nonradiating  No alleviating exacerbating factors  He does note the pain came on after eating something  Does have a history of reflux pre typically feels that in his throat and this is lower in his chest he has not taken anything for symptoms  Denies associated dyspnea, nausea vomiting or diaphoresis  Denies abdominal pain, urinary or bowel symptoms  History of ablation for SVT in the past otherwise no cardiac procedures  No PE or DVT risk factors  Prior to Admission Medications   Prescriptions Last Dose Informant Patient Reported? Taking?    Accu-Chek FastClix Lancets MISC  Self No No   Sig: Test blood sugar once daily for fluctuating blood sugars   Blood Glucose Monitoring Suppl (Accu-Chek Joya Plus) w/Device KIT  Self No No   Sig: Test blood sugar once daily for fluctuating blood sugars   Patient not taking: Reported on 3/30/2021   aspirin 81 MG tablet  Self Yes No   Sig: Take 1 tablet by mouth daily   atorvastatin (LIPITOR) 20 mg tablet  Self No No   Sig: Take 1 tablet (20 mg total) by mouth daily   Patient not taking: Reported on 12/13/2021    cyclobenzaprine (FLEXERIL) 5 mg tablet  Self No No   Sig: Take 1 tablet (5 mg total) by mouth 3 (three) times a day as needed for muscle spasms   Patient not taking: Reported on 12/13/2021    esomeprazole (NexIUM) 20 mg capsule  Self Yes No   Sig: Take 20 mg by mouth every morning before breakfast   glucose blood (Accu-Chek Joya Plus) test strip  Self No No   Sig: Test blood sugar once daily for fluctuating blood sugars   Patient not taking: Reported on 3/30/2021   lisinopril (ZESTRIL) 20 mg tablet  Self No No   Sig: Take 1 tablet (20 mg total) by mouth daily   Patient not taking: Reported on 12/27/2021    meloxicam (MOBIC) 7 5 mg tablet  Self Yes No   Sig: Take 7 5 mg by mouth 2 (two) times a day   metFORMIN (GLUCOPHAGE-XR) 750 mg 24 hr tablet  Self No No   Sig: Take 1 tablet in AM for x 2 weeks, if tolerating increase to 2 tablets daily in AM   methocarbamol (ROBAXIN) 750 mg tablet  Self Yes No   Sig: take 1 tablet by mouth at bedtime if needed for BACK PAIN   Patient not taking: Reported on 12/13/2021   omeprazole (PriLOSEC OTC) 20 MG tablet  Self No No   Sig: Take 1 tablet (20 mg total) by mouth daily   sitaGLIPtin (JANUVIA) 50 mg tablet  Self No No   Sig: Take 1 tablet (50 mg total) by mouth daily   Patient not taking: Reported on 3/30/2021   tamsulosin (FLOMAX) 0 4 mg  Self Yes No   Sig: Take 1 capsule by mouth Daily      Facility-Administered Medications: None       Past Medical History:   Diagnosis Date    Acid reflux     Diabetes mellitus (HCC)     GERD (gastroesophageal reflux disease)     Hypertension     TRES (obstructive sleep apnea)     Panic disorder     SVT (supraventricular tachycardia) (HCC)        Past Surgical History:   Procedure Laterality Date    AV NODE ABLATION      CARDIAC SURGERY      CYST REMOVAL      CA LAP,CHOLECYSTECTOMY N/A 9/24/2019    Procedure: CHOLECYSTECTOMY LAPAROSCOPIC;  Surgeon: Chidi Leach DO;  Location: [facility];  Service: General       Family History   Problem Relation Age of Onset    Diabetes Mother     Kidney disease Mother     Liver cancer Mother     Heart disease Mother     Hypertension Mother     Thyroid disease Mother     Diabetes Sister     Hypertension Sister     Thyroid disease Father     Stroke Maternal Uncle      I have reviewed and agree with the history as documented      E-Cigarette/Vaping    E-Cigarette Use Never User      E-Cigarette/Vaping Substances     Social History     Tobacco Use    Smoking status: Former Smoker     Quit date: 2013     Years since quittin 9    Smokeless tobacco: Never Used   Vaping Use    Vaping Use: Never used   Substance Use Topics    Alcohol use: Yes     Comment: social    Drug use: Never       Review of Systems   Constitutional: Negative for fever  HENT: Negative for sore throat  Eyes: Negative for photophobia  Respiratory: Negative for shortness of breath  Cardiovascular: Positive for chest pain  Gastrointestinal: Negative for abdominal pain  Genitourinary: Negative for dysuria  Musculoskeletal: Negative for back pain  Skin: Negative for rash  Neurological: Negative for light-headedness  Hematological: Negative for adenopathy  Psychiatric/Behavioral: Negative for agitation  All other systems reviewed and are negative  Physical Exam  Physical Exam  Vitals reviewed  Constitutional:       General: He is not in acute distress  Appearance: He is well-developed  HENT:      Head: Normocephalic  Eyes:      Pupils: Pupils are equal, round, and reactive to light  Cardiovascular:      Rate and Rhythm: Normal rate and regular rhythm  Heart sounds: Normal heart sounds  No murmur heard  No friction rub  No gallop  Pulmonary:      Effort: Pulmonary effort is normal       Breath sounds: Normal breath sounds  Abdominal:      General: Bowel sounds are normal  There is no distension  Palpations: Abdomen is soft  Tenderness: There is no abdominal tenderness  There is no guarding  Musculoskeletal:         General: Normal range of motion  Cervical back: Normal range of motion and neck supple  Skin:     Capillary Refill: Capillary refill takes less than 2 seconds  Neurological:      Mental Status: He is alert and oriented to person, place, and time  Cranial Nerves: No cranial nerve deficit  Sensory: No sensory deficit  Motor: No abnormal muscle tone  Psychiatric:         Behavior: Behavior normal          Thought Content:  Thought content normal          Judgment: Judgment normal          Vital Signs  ED Triage Vitals   Temperature Pulse Respirations Blood Pressure SpO2   10/04/22 1724 10/04/22 1724 10/04/22 1724 10/04/22 1727 10/04/22 1724   98 7 °F (37 1 °C) 56 18 (!) 194/103 99 %      Temp Source Heart Rate Source Patient Position - Orthostatic VS BP Location FiO2 (%)   10/04/22 1724 10/04/22 1724 10/04/22 1724 10/04/22 1724 --   Temporal Monitor Sitting Left arm       Pain Score       10/04/22 1724       No Pain           Vitals:    10/04/22 1724 10/04/22 1727 10/04/22 1932   BP:  (!) 194/103 162/83   Pulse: 56  (!) 47   Patient Position - Orthostatic VS: Sitting  Lying         Visual Acuity      ED Medications  Medications   Famotidine (PF) (PEPCID) injection 20 mg (20 mg Intravenous Given 10/4/22 1759)   aluminum-magnesium hydroxide-simethicone (MYLANTA) oral suspension 30 mL (30 mL Oral Given 10/4/22 1758)   sucralfate (CARAFATE) tablet 1 g (1 g Oral Given 10/4/22 1758)       Diagnostic Studies  Results Reviewed     Procedure Component Value Units Date/Time    HS Troponin I 2hr [[id number]] Collected: 10/04/22 1934    Lab Status: Final result Specimen: Blood from Arm, Right Updated: 10/04/22 2010     hs TnI 2hr <2 ng/L      Delta 2hr hsTnI --    Lipase [[id number]]  (Normal) Collected: 10/04/22 1737    Lab Status: Final result Specimen: Blood from Arm, Right Updated: 10/04/22 1835     Lipase 46 u/L     Comprehensive metabolic panel [[id number]]  (Abnormal) Collected: 10/04/22 1737    Lab Status: Final result Specimen: Blood from Arm, Right Updated: 10/04/22 1835     Sodium 137 mmol/L      Potassium 4 0 mmol/L      Chloride 101 mmol/L      CO2 29 mmol/L      ANION GAP 7 mmol/L      BUN 13 mg/dL      Creatinine 1 01 mg/dL      Glucose 165 mg/dL      Calcium 9 5 mg/dL      AST 18 U/L      ALT 32 U/L      Alkaline Phosphatase 65 U/L      Total Protein 7 1 g/dL      Albumin 4 1 g/dL      Total Bilirubin 0 45 mg/dL      eGFR 82 ml/min/1 73sq m Narrative:      National Kidney Disease Foundation guidelines for Chronic Kidney Disease (CKD):     Stage 1 with normal or high GFR (GFR > 90 mL/min/1 73 square meters)    Stage 2 Mild CKD (GFR = 60-89 mL/min/1 73 square meters)    Stage 3A Moderate CKD (GFR = 45-59 mL/min/1 73 square meters)    Stage 3B Moderate CKD (GFR = 30-44 mL/min/1 73 square meters)    Stage 4 Severe CKD (GFR = 15-29 mL/min/1 73 square meters)    Stage 5 End Stage CKD (GFR <15 mL/min/1 73 square meters)  Note: GFR calculation is accurate only with a steady state creatinine    HS Troponin 0hr (reflex protocol) [[id number]]  (Normal) Collected: 10/04/22 1737    Lab Status: Final result Specimen: Blood from Arm, Right Updated: 10/04/22 1806     hs TnI 0hr <2 ng/L     CBC and differential [[id number]]  (Abnormal) Collected: 10/04/22 1737    Lab Status: Final result Specimen: Blood from Arm, Right Updated: 10/04/22 1744     WBC 7 43 Thousand/uL      RBC 4 85 Million/uL      Hemoglobin 14 6 g/dL      Hematocrit 43 6 %      MCV 90 fL      MCH 30 1 pg      MCHC 33 5 g/dL      RDW 12 4 %      MPV 10 4 fL      Platelets 031 Thousands/uL      nRBC 0 /100 WBCs      Neutrophils Relative 53 %      Immat GRANS % 0 %      Lymphocytes Relative 27 %      Monocytes Relative 15 %      Eosinophils Relative 4 %      Basophils Relative 1 %      Neutrophils Absolute 3 92 Thousands/µL      Immature Grans Absolute 0 02 Thousand/uL      Lymphocytes Absolute 2 01 Thousands/µL      Monocytes Absolute 1 08 Thousand/µL      Eosinophils Absolute 0 33 Thousand/µL      Basophils Absolute 0 07 Thousands/µL                  X-ray chest 1 view portable   Final Result by Danika Smith MD (10/05 3859)      No acute cardiopulmonary disease        Findings are stable            Workstation performed: [id number]                    Procedures  ECG 12 Lead Documentation Only    Date/Time: 10/4/2022 7:25 PM  Performed by: Kobe Snyder MD  Authorized by: Les Galdamez MD Julio     ECG reviewed by me, the ED Provider: yes    Patient location:  ED  Previous ECG:     Previous ECG:  Unavailable    Comparison to cardiac monitor: Yes    Interpretation:     Interpretation: normal    Rate:     ECG rate assessment: bradycardic    Rhythm:     Rhythm: sinus bradycardia    Ectopy:     Ectopy: none    QRS:     QRS axis:  Normal    QRS intervals:  Normal  Conduction:     Conduction: normal    ST segments:     ST segments:  Normal  T waves:     T waves: normal               ED Course  ED Course as of 10/05/22 1254   Tue Oct 04, 2022   1842 Patient reassessed, chest pain has resolved  Awaiting delta troponin likely discharge  HEART Risk Score    Flowsheet Row Most Recent Value   Heart Score Risk Calculator    History 0 Filed at: 10/04/2022 1843   ECG 0 Filed at: 10/04/2022 1843   Age 1 Filed at: 10/04/2022 1843   Risk Factors 2 Filed at: 10/04/2022 1843   Troponin 0 Filed at: 10/04/2022 1843   HEART Score 3 Filed at: 10/04/2022 1843                                      MDM  Number of Diagnoses or Management Options  Non-cardiac chest pain  Diagnosis management comments: Patient is a 58-year-old male presents for evaluation chest pain  Likely related GERD as this started after eating and resolved after GI cocktail  Cardiac workup unremarkable  Advised follow-up with cardiology with strict return precautions  Disposition  Final diagnoses:   Non-cardiac chest pain     Time reflects when diagnosis was documented in both MDM as applicable and the Disposition within this note     Time User Action Codes Description Comment    10/4/2022  7:30 PM Fercho Aiken Add [R07 89] Non-cardiac chest pain       ED Disposition     ED Disposition   Discharge    Condition   Stable    Date/Time   Tue Oct 4, 2022  8:30 PM    Comment   Marissa Greenberg discharge to home/self care                 Follow-up Information     Follow up With Specialties Details Why Contact Info Additional Information [facility] Emergency Department Emergency Medicine  If symptoms worsen [address] [place] [phone]  [facility] Emergency Department, [address] [facility]    Marlene Gonzales MD Cardiology Schedule an appointment as soon as possible for a visit   Seng Fernandez [address] [facility]             Discharge Medication List as of 10/4/2022  8:37 PM      START taking these medications    Details   aluminum-magnesium hydroxide-simethicone (MAALOX MAX) 692-308-38 MG/5ML suspension Take 10 mL by mouth every 6 (six) hours as needed for indigestion or heartburn, Starting Tue 10/4/2022, Normal      sucralfate (CARAFATE) 1 g tablet Take 1 tablet (1 g total) by mouth 2 (two) times a day as needed (Abdominal pain), Starting Tue 10/4/2022, Normal         CONTINUE these medications which have NOT CHANGED    Details   Accu-Chek FastClix Lancets MISC Test blood sugar once daily for fluctuating blood sugars, Normal      aspirin 81 MG tablet Take 1 tablet by mouth daily, Historical Med      atorvastatin (LIPITOR) 20 mg tablet Take 1 tablet (20 mg total) by mouth daily, Starting Tue 12/1/2020, Normal      Blood Glucose Monitoring Suppl (Accu-Chek Joya Plus) w/Device KIT Test blood sugar once daily for fluctuating blood sugars, Normal      cyclobenzaprine (FLEXERIL) 5 mg tablet Take 1 tablet (5 mg total) by mouth 3 (three) times a day as needed for muscle spasms, Starting Sun 9/5/2021, Normal      esomeprazole (NexIUM) 20 mg capsule Take 20 mg by mouth every morning before breakfast, Historical Med      glucose blood (Accu-Chek Joya Plus) test strip Test blood sugar once daily for fluctuating blood sugars, Normal      lisinopril (ZESTRIL) 20 mg tablet Take 1 tablet (20 mg total) by mouth daily, Starting Tue 12/1/2020, Normal      meloxicam (MOBIC) 7 5 mg tablet Take 7 5 mg by mouth 2 (two) times a day, Starting Fri 9/17/2021, Historical Med      metFORMIN (GLUCOPHAGE-XR) 750 mg 24 hr tablet Take 1 tablet in AM for x 2 weeks, if tolerating increase to 2 tablets daily in AM, Normal      methocarbamol (ROBAXIN) 750 mg tablet take 1 tablet by mouth at bedtime if needed for BACK PAIN, Historical Med      omeprazole (PriLOSEC OTC) 20 MG tablet Take 1 tablet (20 mg total) by mouth daily, Starting Fri 10/30/2020, Normal      sitaGLIPtin (JANUVIA) 50 mg tablet Take 1 tablet (50 mg total) by mouth daily, Starting Fri 1/29/2021, Normal      tamsulosin (FLOMAX) 0 4 mg Take 1 capsule by mouth Daily, Starting Mon 8/2/2021, Historical Med             No discharge procedures on file      PDMP Review     None          ED Provider  Electronically Signed by           Ramandeep Machado MD  10/05/22 9898

## 2022-10-05 LAB
ATRIAL RATE: 47 BPM
ATRIAL RATE: 50 BPM
P AXIS: 55 DEGREES
P AXIS: 56 DEGREES
PR INTERVAL: 178 MS
PR INTERVAL: 182 MS
QRS AXIS: 31 DEGREES
QRS AXIS: 40 DEGREES
QRSD INTERVAL: 80 MS
QRSD INTERVAL: 84 MS
QT INTERVAL: 440 MS
QT INTERVAL: 444 MS
QTC INTERVAL: 392 MS
QTC INTERVAL: 401 MS
T WAVE AXIS: 29 DEGREES
T WAVE AXIS: 55 DEGREES
VENTRICULAR RATE: 47 BPM
VENTRICULAR RATE: 50 BPM

## 2022-10-05 PROCEDURE — 93010 ELECTROCARDIOGRAM REPORT: CPT | Performed by: INTERNAL MEDICINE

## 2022-10-05 NOTE — ED CARE HANDOFF
Emergency Department Sign Out Note        Sign out and transfer of care from Dr matthew  See Separate Emergency Department note  The patient, Richard Tovar, was evaluated by the previous provider for chest pain  Workup Completed:  Troponin x2, EKG, CBC, CMP    ED Course / Workup Pending (followup): Patient had complete resolution of chest pain while in the emergency department  Second troponin undetectable  Patient offered inpatient observation which she declined after discussion of risks and benefits  He appeared clinically well  Heart score of 3  Discussed warning signs and symptoms with the patient as well as when to return to the emergency department versus follow up with PC [facility]  Patient states understanding and agreement with the plan  Patient care delayed due to critical capacity in the emergency department  This note was completed using dictation software  HEART Risk Score    Flowsheet Row Most Recent Value   Heart Score Risk Calculator    History 0 Filed at: 10/04/2022 1843   ECG 0 Filed at: 10/04/2022 1843   Age 1 Filed at: 10/04/2022 1843   Risk Factors 2 Filed at: 10/04/2022 1843   Troponin 0 Filed at: 10/04/2022 1843   HEART Score 3 Filed at: 10/04/2022 1843                                     Procedures  MDM        Disposition  Final diagnoses:   Non-cardiac chest pain     Time reflects when diagnosis was documented in both MDM as applicable and the Disposition within this note     Time User Action Codes Description Comment    10/4/2022  7:30 PM Bk Su Add [R07 89] Non-cardiac chest pain       ED Disposition     ED Disposition   Discharge    Condition   Stable    Date/Time   Tue Oct 4, 2022 Olga Gutiérrez 89 discharge to home/self care                 Follow-up Information     Follow up With Specialties Details Why [address] Emergency Department Emergency Medicine  If symptoms worsen [address] [place] [phone]  [facility] Emergency Department, [address] [place] [address] [facility]    Geovanni Sullivan MD Cardiology Schedule an appointment as soon as possible for a visit   [facility] [address] [facility]           Discharge Medication List as of 10/4/2022  8:37 PM      START taking these medications    Details   aluminum-magnesium hydroxide-simethicone (MAALOX MAX) [phone] MG/5ML suspension Take 10 mL by mouth every 6 (six) hours as needed for indigestion or heartburn, Starting Tue 10/4/2022, Normal      sucralfate (CARAFATE) 1 g tablet Take 1 tablet (1 g total) by mouth 2 (two) times a day as needed (Abdominal pain), Starting Tue 10/4/2022, Normal         CONTINUE these medications which have NOT CHANGED    Details   Accu-Chek FastClix Lancets MISC Test blood sugar once daily for fluctuating blood sugars, Normal      aspirin 81 MG tablet Take 1 tablet by mouth daily, Historical Med      atorvastatin (LIPITOR) 20 mg tablet Take 1 tablet (20 mg total) by mouth daily, Starting Tue 12/1/2020, Normal      Blood Glucose Monitoring Suppl (Accu-Chek Joya Plus) w/Device KIT Test blood sugar once daily for fluctuating blood sugars, Normal      cyclobenzaprine (FLEXERIL) 5 mg tablet Take 1 tablet (5 mg total) by mouth 3 (three) times a day as needed for muscle spasms, Starting Sun 9/5/2021, Normal      esomeprazole (NexIUM) 20 mg capsule Take 20 mg by mouth every morning before breakfast, Historical Med      glucose blood (Accu-Chek Joya Plus) test strip Test blood sugar once daily for fluctuating blood sugars, Normal      lisinopril (ZESTRIL) 20 mg tablet Take 1 tablet (20 mg total) by mouth daily, Starting Tue 12/1/2020, Normal      meloxicam (MOBIC) 7 5 mg tablet Take 7 5 mg by mouth 2 (two) times a day, Starting Fri 9/17/2021, Historical Med      metFORMIN (GLUCOPHAGE-XR) 750 mg 24 hr tablet Take 1 tablet in AM for x 2 weeks, if tolerating increase to 2 tablets daily in AM, Normal      methocarbamol (ROBAXIN) 750 mg tablet take 1 tablet by mouth at bedtime if needed for BACK PAIN, Historical Med      omeprazole (PriLOSEC OTC) 20 MG tablet Take 1 tablet (20 mg total) by mouth daily, Starting Fri 10/30/2020, Normal      sitaGLIPtin (JANUVIA) 50 mg tablet Take 1 tablet (50 mg total) by mouth daily, Starting Fri 1/29/2021, Normal      tamsulosin (FLOMAX) 0 4 mg Take 1 capsule by mouth Daily, Starting Mon 8/2/2021, Historical Med           No discharge procedures on file         ED Provider  Electronically Signed by     Kashif aJck MD  10/05/22 0010

## 2023-01-30 ENCOUNTER — APPOINTMENT (OUTPATIENT)
Dept: LAB | Facility: CLINIC | Age: 58
End: 2023-01-30

## 2023-01-30 DIAGNOSIS — E78.2 MIXED HYPERLIPIDEMIA: ICD-10-CM

## 2023-01-30 DIAGNOSIS — E11.9 TYPE 2 DIABETES MELLITUS WITHOUT COMPLICATION, UNSPECIFIED WHETHER LONG TERM INSULIN USE (HCC): ICD-10-CM

## 2023-01-30 LAB
ANION GAP SERPL CALCULATED.3IONS-SCNC: 7 MMOL/L (ref 4–13)
BUN SERPL-MCNC: 15 MG/DL (ref 5–25)
CALCIUM SERPL-MCNC: 8.8 MG/DL (ref 8.3–10.1)
CHLORIDE SERPL-SCNC: 109 MMOL/L (ref 96–108)
CO2 SERPL-SCNC: 24 MMOL/L (ref 21–32)
CREAT SERPL-MCNC: 1.05 MG/DL (ref 0.6–1.3)
EST. AVERAGE GLUCOSE BLD GHB EST-MCNC: 128 MG/DL
GFR SERPL CREATININE-BSD FRML MDRD: 78 ML/MIN/1.73SQ M
GLUCOSE P FAST SERPL-MCNC: 112 MG/DL (ref 65–99)
HBA1C MFR BLD: 6.1 %
LDLC SERPL DIRECT ASSAY-MCNC: 121 MG/DL (ref 0–100)
POTASSIUM SERPL-SCNC: 4.5 MMOL/L (ref 3.5–5.3)
SODIUM SERPL-SCNC: 140 MMOL/L (ref 135–147)

## 2023-03-14 ENCOUNTER — OFFICE VISIT (OUTPATIENT)
Dept: URGENT CARE | Facility: MEDICAL CENTER | Age: 58
End: 2023-03-14

## 2023-03-14 VITALS
RESPIRATION RATE: 20 BRPM | SYSTOLIC BLOOD PRESSURE: 120 MMHG | HEIGHT: 66 IN | BODY MASS INDEX: 36.96 KG/M2 | DIASTOLIC BLOOD PRESSURE: 80 MMHG | WEIGHT: 230 LBS | TEMPERATURE: 98.8 F | OXYGEN SATURATION: 97 % | HEART RATE: 92 BPM

## 2023-03-14 DIAGNOSIS — J02.0 STREP PHARYNGITIS: Primary | ICD-10-CM

## 2023-03-14 DIAGNOSIS — J02.9 SORE THROAT: ICD-10-CM

## 2023-03-14 LAB
S PYO AG THROAT QL: NEGATIVE
SARS-COV-2 AG UPPER RESP QL IA: NEGATIVE
VALID CONTROL: NORMAL

## 2023-03-14 RX ORDER — PENICILLIN V POTASSIUM 500 MG/1
500 TABLET ORAL EVERY 12 HOURS
Qty: 20 TABLET | Refills: 0 | Status: SHIPPED | OUTPATIENT
Start: 2023-03-14 | End: 2023-03-24

## 2023-03-14 NOTE — LETTER
March 14, 2023     Patient: Mikala Nieves   YOB: 1965   Date of Visit: 3/14/2023       To Whom It May Concern: It is my medical opinion that Mikala Nieves may return to work on 03/15/2023    If you have any questions or concerns, please don't hesitate to call           Sincerely,        Yuli Pritchard PA-C    CC: No Recipients

## 2023-03-14 NOTE — PATIENT INSTRUCTIONS
Take antibiotic as directed  Discard toothbrush in 48 hours  Drink plenty of fluids, rest, saltwater gargles, lozenges, hot tea with honey  Tylenol for pain  If you develop a prolonged high fever, difficulty breathing, swallowing, managing secretions, decreased fluid intake, or urination, any new or concerning symptoms please return or proceed ER  Recommend following up with PCP in 3-5 days

## 2023-03-14 NOTE — PROGRESS NOTES
[address] [facility]        NAME: Angie Viera is a 65 y o  male  : 1965    MRN: [id number]  DATE: 2023  TIME: 3:15 PM    Assessment and Plan   Strep pharyngitis [J02 0]  1  Strep pharyngitis  penicillin V potassium (VEETID) 500 mg tablet      2  Sore throat  Poct Covid 19 Rapid Antigen Test    POCT rapid strepA    Throat culture            Patient Instructions     Patient Instructions   Take antibiotic as directed  Discard toothbrush in 48 hours  Drink plenty of fluids, rest, saltwater gargles, lozenges, hot tea with honey  Tylenol for pain  If you develop a prolonged high fever, difficulty breathing, swallowing, managing secretions, decreased fluid intake, or urination, any new or concerning symptoms please return or proceed ER  Recommend following up with PCP in 3-5 days  Chief Complaint     Chief Complaint   Patient presents with   • Diarrhea     Diarrhea, fever, stomach pain, denies vomiting, symptoms started yesterday, c/o head congestion          History of Present Illness       URI   This is a new problem  The current episode started yesterday  The problem has been unchanged  There has been no fever  Associated symptoms include congestion, diarrhea, rhinorrhea and a sore throat  Pertinent negatives include no abdominal pain, chest pain, coughing, dysuria, ear pain, headaches, joint swelling, nausea, neck pain, rash, sinus pain, swollen glands, vomiting or wheezing  He has tried acetaminophen for the symptoms  The treatment provided mild relief  Took antibiotics 2 weeks ago for a sinus infection, unsure what it was  Review of Systems   Review of Systems   Constitutional: Negative for chills, diaphoresis, fatigue and fever  HENT: Positive for congestion, rhinorrhea and sore throat  Negative for ear pain, facial swelling, mouth sores, postnasal drip, sinus pressure, sinus pain and trouble swallowing  Eyes: Negative for photophobia and visual disturbance     Respiratory: Negative for cough, chest tightness, shortness of breath and wheezing  Cardiovascular: Negative for chest pain and palpitations  Gastrointestinal: Positive for diarrhea  Negative for abdominal pain, nausea and vomiting  Genitourinary: Negative  Negative for dysuria  Musculoskeletal: Positive for myalgias  Negative for arthralgias, back pain, joint swelling, neck pain and neck stiffness  Skin: Negative for rash  Neurological: Negative for dizziness, facial asymmetry, weakness, light-headedness, numbness and headaches           Current Medications       Current Outpatient Medications:   •  penicillin V potassium (VEETID) 500 mg tablet, Take 1 tablet (500 mg total) by mouth every 12 (twelve) hours for 10 days, Disp: 20 tablet, Rfl: 0  •  Accu-Chek FastClix Lancets MISC, Test blood sugar once daily for fluctuating blood sugars, Disp: 100 each, Rfl: 3  •  aluminum-magnesium hydroxide-simethicone (MAALOX MAX) 400-400-40 MG/5ML suspension, Take 10 mL by mouth every 6 (six) hours as needed for indigestion or heartburn, Disp: 355 mL, Rfl: 0  •  aspirin 81 MG tablet, Take 1 tablet by mouth daily, Disp: , Rfl:   •  atorvastatin (LIPITOR) 20 mg tablet, Take 1 tablet (20 mg total) by mouth daily (Patient not taking: Reported on 12/13/2021 ), Disp: 90 tablet, Rfl: 0  •  Blood Glucose Monitoring Suppl (Accu-Chek Joya Plus) w/Device KIT, Test blood sugar once daily for fluctuating blood sugars (Patient not taking: Reported on 3/30/2021), Disp: 1 kit, Rfl: 0  •  cyclobenzaprine (FLEXERIL) 5 mg tablet, Take 1 tablet (5 mg total) by mouth 3 (three) times a day as needed for muscle spasms (Patient not taking: Reported on 12/13/2021 ), Disp: 10 tablet, Rfl: 0  •  esomeprazole (NexIUM) 20 mg capsule, Take 20 mg by mouth every morning before breakfast, Disp: , Rfl:   •  glucose blood (Accu-Chek Joya Plus) test strip, Test blood sugar once daily for fluctuating blood sugars (Patient not taking: Reported on 3/30/2021), Disp: 100 each, Rfl: 3  •  lisinopril (ZESTRIL) 20 mg tablet, Take 1 tablet (20 mg total) by mouth daily (Patient not taking: Reported on 12/27/2021 ), Disp: 90 tablet, Rfl: 0  •  meloxicam (MOBIC) 7 5 mg tablet, Take 7 5 mg by mouth 2 (two) times a day, Disp: , Rfl:   •  metFORMIN (GLUCOPHAGE-XR) 750 mg 24 hr tablet, Take 1 tablet in AM for x 2 weeks, if tolerating increase to 2 tablets daily in AM, Disp: 60 tablet, Rfl: 1  •  methocarbamol (ROBAXIN) 750 mg tablet, take 1 tablet by mouth at bedtime if needed for BACK PAIN (Patient not taking: Reported on 12/13/2021), Disp: , Rfl:   •  omeprazole (PriLOSEC OTC) 20 MG tablet, Take 1 tablet (20 mg total) by mouth daily, Disp: 30 tablet, Rfl: 2  •  sitaGLIPtin (JANUVIA) 50 mg tablet, Take 1 tablet (50 mg total) by mouth daily (Patient not taking: Reported on 3/30/2021), Disp: 90 tablet, Rfl: 0  •  sucralfate (CARAFATE) 1 g tablet, Take 1 tablet (1 g total) by mouth 2 (two) times a day as needed (Abdominal pain), Disp: 40 tablet, Rfl: 0  •  tamsulosin (FLOMAX) 0 4 mg, Take 1 capsule by mouth Daily, Disp: , Rfl:     Current Allergies     Allergies as of 03/14/2023   • (No Known Allergies)            The following portions of the patient's history were reviewed and updated as appropriate: allergies, current medications, past family history, past medical history, past social history, past surgical history and problem list      Past Medical History:   Diagnosis Date   • Acid reflux    • Diabetes mellitus ([address] )    • GERD (gastroesophageal reflux disease)    • Hypertension    • TRES (obstructive sleep apnea)    • Panic disorder    • SVT (supraventricular tachycardia) ([address] )        Past Surgical History:   Procedure Laterality Date   • AV NODE ABLATION     • CARDIAC SURGERY     • CYST REMOVAL     • CA LAPAROSCOPY SURG CHOLECYSTECTOMY N/A 9/24/2019    Procedure: CHOLECYSTECTOMY LAPAROSCOPIC;  Surgeon: Lynn Elena DO;  Location: [facility];  Service: General       Family History Problem Relation Age of Onset   • Diabetes Mother    • Kidney disease Mother    • Liver cancer Mother    • Heart disease Mother    • Hypertension Mother    • Thyroid disease Mother    • Diabetes Sister    • Hypertension Sister    • Thyroid disease Father    • Stroke Maternal Uncle          Medications have been verified  Objective   /80   Pulse 92   Temp 98 8 °F (37 1 °C)   Resp 20   Ht 5' 6" (1 676 m)   Wt 104 kg (230 lb)   SpO2 97%   BMI 37 12 kg/m²   No LMP for male patient  Physical Exam     Physical Exam  Constitutional:       General: He is not in acute distress  Appearance: He is well-developed  HENT:      Head: Normocephalic and atraumatic  Right Ear: Hearing, tympanic membrane, ear canal and external ear normal       Left Ear: Hearing, tympanic membrane, ear canal and external ear normal       Nose: Nose normal       Mouth/Throat:      Mouth: Mucous membranes are moist       Pharynx: Oropharynx is clear  Uvula midline  Posterior oropharyngeal erythema present  No oropharyngeal exudate  Tonsils: Tonsillar exudate present  No tonsillar abscesses  2+ on the right  2+ on the left  Cardiovascular:      Rate and Rhythm: Normal rate and regular rhythm  Heart sounds: Normal heart sounds, S1 normal and S2 normal    Pulmonary:      Effort: Pulmonary effort is normal       Breath sounds: Normal breath sounds and air entry  Lymphadenopathy:      Cervical: Cervical adenopathy present  Right cervical: Superficial cervical adenopathy present  Left cervical: Superficial cervical adenopathy present  Skin:     General: Skin is warm and dry  Capillary Refill: Capillary refill takes less than 2 seconds  Neurological:      Mental Status: He is alert and oriented to person, place, and time

## 2023-03-16 LAB — BACTERIA THROAT CULT: NORMAL

## 2023-05-23 ENCOUNTER — APPOINTMENT (OUTPATIENT)
Dept: RADIOLOGY | Facility: CLINIC | Age: 58
End: 2023-05-23

## 2023-05-23 DIAGNOSIS — M54.2 NECK PAIN: ICD-10-CM

## 2023-06-04 ENCOUNTER — APPOINTMENT (OUTPATIENT)
Dept: RADIOLOGY | Facility: CLINIC | Age: 58
End: 2023-06-04
Payer: MEDICARE

## 2023-06-04 ENCOUNTER — OFFICE VISIT (OUTPATIENT)
Dept: URGENT CARE | Facility: CLINIC | Age: 58
End: 2023-06-04
Payer: MEDICARE

## 2023-06-04 VITALS
RESPIRATION RATE: 18 BRPM | HEART RATE: 67 BPM | OXYGEN SATURATION: 96 % | TEMPERATURE: 97.4 F | DIASTOLIC BLOOD PRESSURE: 78 MMHG | SYSTOLIC BLOOD PRESSURE: 139 MMHG

## 2023-06-04 DIAGNOSIS — M79.672 LEFT FOOT PAIN: Primary | ICD-10-CM

## 2023-06-04 DIAGNOSIS — M79.672 LEFT FOOT PAIN: ICD-10-CM

## 2023-06-04 PROCEDURE — 99213 OFFICE O/P EST LOW 20 MIN: CPT | Performed by: PHYSICIAN ASSISTANT

## 2023-06-04 PROCEDURE — G0463 HOSPITAL OUTPT CLINIC VISIT: HCPCS | Performed by: PHYSICIAN ASSISTANT

## 2023-06-04 PROCEDURE — 73630 X-RAY EXAM OF FOOT: CPT

## 2023-06-04 RX ORDER — NAPROXEN 500 MG/1
500 TABLET ORAL 2 TIMES DAILY WITH MEALS
Qty: 60 TABLET | Refills: 0 | Status: SHIPPED | OUTPATIENT
Start: 2023-06-04

## 2023-06-04 RX ORDER — TERBINAFINE HYDROCHLORIDE 250 MG/1
250 TABLET ORAL DAILY
COMMUNITY
Start: 2023-06-01

## 2023-06-04 NOTE — PROGRESS NOTES
[address] [facility]        NAME: Samuel Tran is a 65 y o  male  : 1965    MRN: [id number]  DATE: 2023  TIME: 9:50 AM    Assessment and Plan   Left foot pain [M79 672]  1  Left foot pain  XR foot 3+ vw left    naproxen (Naprosyn) 500 mg tablet            Patient Instructions       Follow up with PCP in 3-5 days  Proceed to  ER if symptoms worsen  Chief Complaint     Chief Complaint   Patient presents with   • Foot Pain     Left foot pain, started about a week ago         History of Present Illness       Patient is a 64 y/o/m presenting [facility] with [facility] pain  Patient reports pain began about 1 week ago and is uncertain of any injury  There is bruising to part of foot  No prior fracture  Pt does climb in and out of boats and could have twisted foot but is uncertain  No numbness or tingling  Review of Systems   Review of Systems   Constitutional: Negative for chills and fever  HENT: Negative for ear pain and sore throat  Eyes: Negative for pain and visual disturbance  Respiratory: Negative for cough and shortness of breath  Cardiovascular: Negative for chest pain and palpitations  Gastrointestinal: Negative for abdominal pain and vomiting  Genitourinary: Negative for dysuria and hematuria  Musculoskeletal: Positive for arthralgias (Left foot pain)  Negative for back pain  Skin: Negative for color change and rash  Neurological: Negative for seizures and syncope  All other systems reviewed and are negative          Current Medications       Current Outpatient Medications:   •  aspirin 81 MG tablet, Take 1 tablet by mouth daily, Disp: , Rfl:   •  cyclobenzaprine (FLEXERIL) 5 mg tablet, Take 1 tablet (5 mg total) by mouth 3 (three) times a day as needed for muscle spasms, Disp: 10 tablet, Rfl: 0  •  meloxicam (MOBIC) 7 5 mg tablet, Take 7 5 mg by mouth 2 (two) times a day, Disp: , Rfl:   •  metFORMIN (GLUCOPHAGE-XR) 750 mg 24 hr tablet, Take 1 tablet in AM for x 2 weeks, if tolerating increase to 2 tablets daily in AM, Disp: 60 tablet, Rfl: 1  •  naproxen (Naprosyn) 500 mg tablet, Take 1 tablet (500 mg total) by mouth 2 (two) times a day with meals, Disp: 60 tablet, Rfl: 0  •  omeprazole (PriLOSEC OTC) 20 MG tablet, Take 1 tablet (20 mg total) by mouth daily, Disp: 30 tablet, Rfl: 2  •  sucralfate (CARAFATE) 1 g tablet, Take 1 tablet (1 g total) by mouth 2 (two) times a day as needed (Abdominal pain), Disp: 40 tablet, Rfl: 0  •  tamsulosin (FLOMAX) 0 4 mg, Take 1 capsule by mouth Daily, Disp: , Rfl:   •  terbinafine (LamISIL) 250 mg tablet, Take 250 mg by mouth daily With meal, Disp: , Rfl:   •  Accu-Chek FastClix Lancets MISC, Test blood sugar once daily for fluctuating blood sugars, Disp: 100 each, Rfl: 3  •  aluminum-magnesium hydroxide-simethicone (MAALOX MAX) 400-400-40 MG/5ML suspension, Take 10 mL by mouth every 6 (six) hours as needed for indigestion or heartburn, Disp: 355 mL, Rfl: 0  •  atorvastatin (LIPITOR) 20 mg tablet, Take 1 tablet (20 mg total) by mouth daily (Patient not taking: Reported on 12/13/2021 ), Disp: 90 tablet, Rfl: 0  •  Blood Glucose Monitoring Suppl (Accu-Chek Joya Plus) w/Device KIT, Test blood sugar once daily for fluctuating blood sugars (Patient not taking: Reported on 3/30/2021), Disp: 1 kit, Rfl: 0  •  esomeprazole (NexIUM) 20 mg capsule, Take 20 mg by mouth every morning before breakfast, Disp: , Rfl:   •  glucose blood (Accu-Chek Joya Plus) test strip, Test blood sugar once daily for fluctuating blood sugars (Patient not taking: Reported on 3/30/2021), Disp: 100 each, Rfl: 3  •  lisinopril (ZESTRIL) 20 mg tablet, Take 1 tablet (20 mg total) by mouth daily (Patient not taking: Reported on 12/27/2021 ), Disp: 90 tablet, Rfl: 0  •  methocarbamol (ROBAXIN) 750 mg tablet, take 1 tablet by mouth at bedtime if needed for BACK PAIN (Patient not taking: Reported on 12/13/2021), Disp: , Rfl:   •  sitaGLIPtin (JANUVIA) 50 mg tablet, Take 1 tablet (50 mg total) by mouth daily (Patient not taking: Reported on 3/30/2021), Disp: 90 tablet, Rfl: 0    Current Allergies     Allergies as of 06/04/2023   • (No Known Allergies)            The following portions of the patient's history were reviewed and updated as appropriate: allergies, current medications, past family history, past medical history, past social history, past surgical history and problem list      Past Medical History:   Diagnosis Date   • Acid reflux    • Diabetes mellitus ([address] )    • GERD (gastroesophageal reflux disease)    • Hypertension    • TRES (obstructive sleep apnea)    • Panic disorder    • SVT (supraventricular tachycardia) ([facility])        Past Surgical History:   Procedure Laterality Date   • AV NODE ABLATION     • CARDIAC SURGERY     • CYST REMOVAL     • PA LAPAROSCOPY SURG CHOLECYSTECTOMY N/A 9/24/2019    Procedure: CHOLECYSTECTOMY LAPAROSCOPIC;  Surgeon: Rizwan Yadav DO;  Location: [facility];  Service: General       Family History   Problem Relation Age of Onset   • Diabetes Mother    • Kidney disease Mother    • Liver cancer Mother    • Heart disease Mother    • Hypertension Mother    • Thyroid disease Mother    • Diabetes Sister    • Hypertension Sister    • Thyroid disease Father    • Stroke Maternal Uncle          Medications have been verified  Objective   /78 (BP Location: Right arm, Patient Position: Sitting)   Pulse 67   Temp (!) 97 4 °F (36 3 °C)   Resp 18   SpO2 96%   No LMP for male patient  Physical Exam     Physical Exam  Constitutional:       Appearance: Normal appearance  He is normal weight  HENT:      Head: Normocephalic and atraumatic  Nose: Nose normal       Mouth/Throat:      Mouth: Mucous membranes are moist    Eyes:      Extraocular Movements: Extraocular movements intact  Conjunctiva/sclera: Conjunctivae normal       Pupils: Pupils are equal, round, and reactive to light  Cardiovascular:      Rate and Rhythm: Normal rate  Pulmonary:      Effort: Pulmonary effort is normal    Musculoskeletal:         General: Normal range of motion  Cervical back: Normal range of motion and neck supple  Feet:    Skin:     General: Skin is warm and dry  Neurological:      General: No focal deficit present  Mental Status: He is alert and oriented to person, place, and time     Psychiatric:         Mood and Affect: Mood normal          Behavior: Behavior normal

## 2023-06-12 ENCOUNTER — APPOINTMENT (OUTPATIENT)
Dept: LAB | Facility: CLINIC | Age: 58
End: 2023-06-12
Payer: MEDICARE

## 2023-06-12 DIAGNOSIS — K76.9 DISEASE OF LIVER: ICD-10-CM

## 2023-06-12 LAB
BASOPHILS # BLD AUTO: 0.06 THOUSANDS/ÂΜL (ref 0–0.1)
BASOPHILS NFR BLD AUTO: 1 % (ref 0–1)
EOSINOPHIL # BLD AUTO: 0.28 THOUSAND/ÂΜL (ref 0–0.61)
EOSINOPHIL NFR BLD AUTO: 5 % (ref 0–6)
ERYTHROCYTE [DISTWIDTH] IN BLOOD BY AUTOMATED COUNT: 12.6 % (ref 11.6–15.1)
HCT VFR BLD AUTO: 43.8 % (ref 36.5–49.3)
HGB BLD-MCNC: 15.1 G/DL (ref 12–17)
IMM GRANULOCYTES # BLD AUTO: 0.03 THOUSAND/UL (ref 0–0.2)
IMM GRANULOCYTES NFR BLD AUTO: 1 % (ref 0–2)
LYMPHOCYTES # BLD AUTO: 1.43 THOUSANDS/ÂΜL (ref 0.6–4.47)
LYMPHOCYTES NFR BLD AUTO: 25 % (ref 14–44)
MCH RBC QN AUTO: 29.9 PG (ref 26.8–34.3)
MCHC RBC AUTO-ENTMCNC: 34.5 G/DL (ref 31.4–37.4)
MCV RBC AUTO: 87 FL (ref 82–98)
MONOCYTES # BLD AUTO: 0.74 THOUSAND/ÂΜL (ref 0.17–1.22)
MONOCYTES NFR BLD AUTO: 13 % (ref 4–12)
NEUTROPHILS # BLD AUTO: 3.1 THOUSANDS/ÂΜL (ref 1.85–7.62)
NEUTS SEG NFR BLD AUTO: 55 % (ref 43–75)
NRBC BLD AUTO-RTO: 0 /100 WBCS
PLATELET # BLD AUTO: 219 THOUSANDS/UL (ref 149–390)
PMV BLD AUTO: 10.7 FL (ref 8.9–12.7)
RBC # BLD AUTO: 5.05 MILLION/UL (ref 3.88–5.62)
WBC # BLD AUTO: 5.64 THOUSAND/UL (ref 4.31–10.16)

## 2023-06-12 PROCEDURE — 36415 COLL VENOUS BLD VENIPUNCTURE: CPT

## 2023-06-12 PROCEDURE — 85025 COMPLETE CBC W/AUTO DIFF WBC: CPT

## 2023-06-12 PROCEDURE — 80076 HEPATIC FUNCTION PANEL: CPT

## 2023-06-13 LAB
ALBUMIN SERPL BCP-MCNC: 3.7 G/DL (ref 3.5–5)
ALP SERPL-CCNC: 77 U/L (ref 46–116)
ALT SERPL W P-5'-P-CCNC: 52 U/L (ref 12–78)
AST SERPL W P-5'-P-CCNC: 16 U/L (ref 5–45)
BILIRUB DIRECT SERPL-MCNC: 0.09 MG/DL (ref 0–0.2)
BILIRUB SERPL-MCNC: 0.33 MG/DL (ref 0.2–1)
PROT SERPL-MCNC: 7.3 G/DL (ref 6.4–8.4)

## 2023-07-30 ENCOUNTER — OFFICE VISIT (OUTPATIENT)
Dept: URGENT CARE | Facility: MEDICAL CENTER | Age: 58
End: 2023-07-30
Payer: MEDICARE

## 2023-07-30 VITALS
RESPIRATION RATE: 20 BRPM | HEIGHT: 64 IN | TEMPERATURE: 98.3 F | HEART RATE: 80 BPM | BODY MASS INDEX: 39.27 KG/M2 | OXYGEN SATURATION: 97 % | WEIGHT: 230 LBS

## 2023-07-30 DIAGNOSIS — L08.9 INFECTED INSECT BITE OF RIGHT UPPER EXTREMITY, INITIAL ENCOUNTER: Primary | ICD-10-CM
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
## 2023-07-30 DIAGNOSIS — S40.861A INFECTED INSECT BITE OF RIGHT UPPER EXTREMITY, INITIAL ENCOUNTER: Primary | ICD-10-CM

## 2023-07-30 PROCEDURE — G0463 HOSPITAL OUTPT CLINIC VISIT: HCPCS | Performed by: PHYSICIAN ASSISTANT

## 2023-07-30 PROCEDURE — 99212 OFFICE O/P EST SF 10 MIN: CPT | Performed by: PHYSICIAN ASSISTANT

## 2023-07-30 RX ORDER — CLINDAMYCIN HYDROCHLORIDE 300 MG/1
300 CAPSULE ORAL 3 TIMES DAILY
Qty: 21 CAPSULE | Refills: 0 | OUTPATIENT
Start: 2023-07-30 | End: 2023-07-31

## 2023-07-30 NOTE — PROGRESS NOTES
[facility]        NAME: Clarence Cowan is a 65 y.o. male  : 1965    MRN: [id number]  DATE: 2023  TIME: 4:26 PM    Assessment and Plan   Infected insect bite of right upper extremity, initial encounter [S40.861A, L08.9, W57. XXXA]  1. Infected insect bite of right upper extremity, initial encounter  clindamycin (CLEOCIN) 300 MG capsule            Patient Instructions       Follow up with PCP in 3-5 days. Proceed to  ER if symptoms worsen. Chief Complaint     Chief Complaint   Patient presents with   • Insect Bite     Pt. Had insect bite to right upper arm, x 3 days, treating with triple antibiotic ointment, area is red and hard, c/o pain and some swelling          History of Present Illness       States he was bitten by an insect on his right upper arm 3 days ago. Having increased pain and redness surrounding the bite. No drainage, fever or chills. Review of Systems   Review of Systems   Constitutional: Negative for chills and fever. Skin: Positive for wound. Hematological: Negative for adenopathy.          Current Medications       Current Outpatient Medications:   •  clindamycin (CLEOCIN) 300 MG capsule, Take 1 capsule (300 mg total) by mouth 3 (three) times a day for 7 days, Disp: 21 capsule, Rfl: 0  •  metFORMIN (GLUCOPHAGE-XR) 750 mg 24 hr tablet, Take 1 tablet in AM for x 2 weeks, if tolerating increase to 2 tablets daily in AM, Disp: 60 tablet, Rfl: 1  •  Accu-Chek FastClix Lancets MISC, Test blood sugar once daily for fluctuating blood sugars, Disp: 100 each, Rfl: 3  •  aluminum-magnesium hydroxide-simethicone (MAALOX MAX) 400-400-40 MG/5ML suspension, Take 10 mL by mouth every 6 (six) hours as needed for indigestion or heartburn, Disp: 355 mL, Rfl: 0  •  aspirin 81 MG tablet, Take 1 tablet by mouth daily, Disp: , Rfl:   •  atorvastatin (LIPITOR) 20 mg tablet, Take 1 tablet (20 mg total) by mouth daily (Patient not taking: Reported on 2021 ), Disp: 90 tablet, Rfl: 0  •  Blood Glucose Monitoring Suppl (Accu-Chek Joya Plus) w/Device KIT, Test blood sugar once daily for fluctuating blood sugars (Patient not taking: Reported on 3/30/2021), Disp: 1 kit, Rfl: 0  •  cyclobenzaprine (FLEXERIL) 5 mg tablet, Take 1 tablet (5 mg total) by mouth 3 (three) times a day as needed for muscle spasms, Disp: 10 tablet, Rfl: 0  •  esomeprazole (NexIUM) 20 mg capsule, Take 20 mg by mouth every morning before breakfast, Disp: , Rfl:   •  glucose blood (Accu-Chek Joya Plus) test strip, Test blood sugar once daily for fluctuating blood sugars (Patient not taking: Reported on 3/30/2021), Disp: 100 each, Rfl: 3  •  lisinopril (ZESTRIL) 20 mg tablet, Take 1 tablet (20 mg total) by mouth daily (Patient not taking: Reported on 12/27/2021 ), Disp: 90 tablet, Rfl: 0  •  meloxicam (MOBIC) 7.5 mg tablet, Take 7.5 mg by mouth 2 (two) times a day, Disp: , Rfl:   •  methocarbamol (ROBAXIN) 750 mg tablet, take 1 tablet by mouth at bedtime if needed for BACK PAIN (Patient not taking: Reported on 12/13/2021), Disp: , Rfl:   •  naproxen (Naprosyn) 500 mg tablet, Take 1 tablet (500 mg total) by mouth 2 (two) times a day with meals, Disp: 60 tablet, Rfl: 0  •  omeprazole (PriLOSEC OTC) 20 MG tablet, Take 1 tablet (20 mg total) by mouth daily, Disp: 30 tablet, Rfl: 2  •  sitaGLIPtin (JANUVIA) 50 mg tablet, Take 1 tablet (50 mg total) by mouth daily (Patient not taking: Reported on 3/30/2021), Disp: 90 tablet, Rfl: 0  •  sucralfate (CARAFATE) 1 g tablet, Take 1 tablet (1 g total) by mouth 2 (two) times a day as needed (Abdominal pain), Disp: 40 tablet, Rfl: 0  •  tamsulosin (FLOMAX) 0.4 mg, Take 1 capsule by mouth Daily, Disp: , Rfl:   •  terbinafine (LamISIL) 250 mg tablet, Take 250 mg by mouth daily With meal, Disp: , Rfl:     Current Allergies     Allergies as of 07/30/2023   • (No Known Allergies)            The following portions of the patient's history were reviewed and updated as appropriate: allergies, current medications, past family history, past medical history, past social history, past surgical history and problem list.     Past Medical History:   Diagnosis Date   • Acid reflux    • Diabetes mellitus ([address])    • GERD (gastroesophageal reflux disease)    • Hypertension    • TRES (obstructive sleep apnea)    • Panic disorder    • SVT (supraventricular tachycardia) (HCC)        Past Surgical History:   Procedure Laterality Date   • AV NODE ABLATION     • CARDIAC SURGERY     • CYST REMOVAL     • CA LAPAROSCOPY SURG CHOLECYSTECTOMY N/A 9/24/2019    Procedure: CHOLECYSTECTOMY LAPAROSCOPIC;  Surgeon: Bhumi Lao DO;  Location: [facility];  Service: General       Family History   Problem Relation Age of Onset   • Diabetes Mother    • Kidney disease Mother    • Liver cancer Mother    • Heart disease Mother    • Hypertension Mother    • Thyroid disease Mother    • Diabetes Sister    • Hypertension Sister    • Thyroid disease Father    • Stroke Maternal Uncle          Medications have been verified. Objective   Pulse 80   Temp 98.3 °F (36.8 °C)   Resp 20   Ht 5' 4" (1.626 m)   Wt 104 kg (230 lb)   SpO2 97%   BMI 39.48 kg/m²   No LMP for male patient. Physical Exam     Physical Exam  Vitals and nursing note reviewed. Constitutional:       Appearance: Normal appearance. HENT:      Head: Normocephalic and atraumatic. Cardiovascular:      Rate and Rhythm: Normal rate and regular rhythm. Pulmonary:      Effort: Pulmonary effort is normal.   Skin:     General: Skin is warm. Comments: Medial aspect right upper arm with open area with surrounding erythema. No fluctuance. TTP   Neurological:      Mental Status: He is alert.

## 2023-07-31 ENCOUNTER — HOSPITAL ENCOUNTER (EMERGENCY)
Facility: HOSPITAL | Age: 58
Discharge: HOME | End: 2023-07-31
Attending: EMERGENCY MEDICINE
Payer: MEDICARE

## 2023-07-31 VITALS
TEMPERATURE: 98.6 F | SYSTOLIC BLOOD PRESSURE: 139 MMHG | HEART RATE: 85 BPM | OXYGEN SATURATION: 100 % | DIASTOLIC BLOOD PRESSURE: 54 MMHG | RESPIRATION RATE: 18 BRPM

## 2023-07-31 DIAGNOSIS — L03.90 CELLULITIS: Primary | ICD-10-CM

## 2023-07-31 LAB
ANION GAP SERPL CALCULATED.3IONS-SCNC: 8 MMOL/L
BASOPHILS # BLD AUTO: 0.03 THOUSANDS/ÂΜL (ref 0–0.1)
BASOPHILS NFR BLD AUTO: 0 % (ref 0–1)
BUN SERPL-MCNC: 11 MG/DL (ref 5–25)
CALCIUM SERPL-MCNC: 9.6 MG/DL (ref 8.4–10.2)
CHLORIDE SERPL-SCNC: 103 MMOL/L (ref 96–108)
CO2 SERPL-SCNC: 28 MMOL/L (ref 21–32)
CREAT SERPL-MCNC: 0.98 MG/DL (ref 0.6–1.3)
EOSINOPHIL # BLD AUTO: 0.39 THOUSAND/ÂΜL (ref 0–0.61)
EOSINOPHIL NFR BLD AUTO: 5 % (ref 0–6)
ERYTHROCYTE [DISTWIDTH] IN BLOOD BY AUTOMATED COUNT: 12.5 % (ref 11.6–15.1)
GFR SERPL CREATININE-BSD FRML MDRD: 85 ML/MIN/1.73SQ M
GLUCOSE SERPL-MCNC: 125 MG/DL (ref 65–140)
HCT VFR BLD AUTO: 44.5 % (ref 36.5–49.3)
HGB BLD-MCNC: 14.8 G/DL (ref 12–17)
IMM GRANULOCYTES # BLD AUTO: 0.02 THOUSAND/UL (ref 0–0.2)
IMM GRANULOCYTES NFR BLD AUTO: 0 % (ref 0–2)
LACTATE SERPL-SCNC: 1.9 MMOL/L (ref 0.5–2)
LYMPHOCYTES # BLD AUTO: 1.63 THOUSANDS/ÂΜL (ref 0.6–4.47)
LYMPHOCYTES NFR BLD AUTO: 22 % (ref 14–44)
MCH RBC QN AUTO: 30 PG (ref 26.8–34.3)
MCHC RBC AUTO-ENTMCNC: 33.3 G/DL (ref 31.4–37.4)
MCV RBC AUTO: 90 FL (ref 82–98)
MONOCYTES # BLD AUTO: 1.49 THOUSAND/ÂΜL (ref 0.17–1.22)
MONOCYTES NFR BLD AUTO: 20 % (ref 4–12)
NEUTROPHILS # BLD AUTO: 3.8 THOUSANDS/ÂΜL (ref 1.85–7.62)
NEUTS SEG NFR BLD AUTO: 53 % (ref 43–75)
NRBC BLD AUTO-RTO: 0 /100 WBCS
PLATELET # BLD AUTO: 181 THOUSANDS/UL (ref 149–390)
PMV BLD AUTO: 10.3 FL (ref 8.9–12.7)
POTASSIUM SERPL-SCNC: 4 MMOL/L (ref 3.5–5.3)
RBC # BLD AUTO: 4.93 MILLION/UL (ref 3.88–5.62)
SODIUM SERPL-SCNC: 139 MMOL/L (ref 135–147)
WBC # BLD AUTO: 7.36 THOUSAND/UL (ref 4.31–10.16)

## 2023-07-31 PROCEDURE — 85025 COMPLETE CBC W/AUTO DIFF WBC: CPT | Performed by: EMERGENCY MEDICINE

## 2023-07-31 PROCEDURE — 80048 BASIC METABOLIC PNL TOTAL CA: CPT | Performed by: EMERGENCY MEDICINE

## 2023-07-31 PROCEDURE — 99284 EMERGENCY DEPT VISIT MOD MDM: CPT | Performed by: EMERGENCY MEDICINE

## 2023-07-31 PROCEDURE — 83605 ASSAY OF LACTIC ACID: CPT | Performed by: EMERGENCY MEDICINE

## 2023-07-31 PROCEDURE — 36415 COLL VENOUS BLD VENIPUNCTURE: CPT | Performed by: EMERGENCY MEDICINE

## 2023-07-31 PROCEDURE — 96365 THER/PROPH/DIAG IV INF INIT: CPT

## 2023-07-31 PROCEDURE — 99283 EMERGENCY DEPT VISIT LOW MDM: CPT

## 2023-07-31 RX ORDER — SULFAMETHOXAZOLE AND TRIMETHOPRIM 800; 160 MG/1; MG/1
1 TABLET ORAL 2 TIMES DAILY
Qty: 14 TABLET | Refills: 0 | Status: SHIPPED | OUTPATIENT
Start: 2023-07-31 | End: 2023-08-07

## 2023-07-31 RX ORDER — CEFTRIAXONE 1 G/50ML
1000 INJECTION, SOLUTION INTRAVENOUS ONCE
Status: COMPLETED | OUTPATIENT
Start: 2023-07-31 | End: 2023-07-31

## 2023-07-31 RX ORDER — CEPHALEXIN 500 MG/1
500 CAPSULE ORAL EVERY 6 HOURS SCHEDULED
Qty: 28 CAPSULE | Refills: 0 | Status: SHIPPED | OUTPATIENT
Start: 2023-07-31 | End: 2023-08-07

## 2023-07-31 RX ADMIN — SODIUM CHLORIDE 1000 ML: 0.9 INJECTION, SOLUTION INTRAVENOUS at 19:33

## 2023-07-31 RX ADMIN — CEFTRIAXONE 1000 MG: 1 INJECTION, SOLUTION INTRAVENOUS at 19:33

## 2023-08-01 NOTE — ED PROVIDER NOTES
Final Diagnosis:  1. Cellulitis        Chief Complaint   Patient presents with   • Insect Bite     Milvia Clayton that started about 3 days ago. Not sure if he was bite by a bug or a pimple. Since this morning started with getting worse. Increased redness, swelling. Started clindamycin yesterday per urgent care. HPI  Patient presents for evaluation of wound infection. Patient states that a couple days ago he noted a spider bite or some kind of pimple on the medial aspect of approximately his mid humerus. He then went to another facility and was prescribed clindamycin. He states he started taking this yesterday afternoon or evening. Today he felt like the area looked worse so he presents now for further evaluation. No fever or chills. Unless otherwise specified:  - No language barrier.   - History obtained from patient. - There are no limitations to the history obtained. - Previous charting was reviewed    PMH:   has a past medical history of Acid reflux, Diabetes mellitus ([address]), GERD (gastroesophageal reflux disease), Hypertension, TRES (obstructive sleep apnea), Panic disorder, and SVT (supraventricular tachycardia) ([address]). PSH:   has a past surgical history that includes Cyst Removal; AV node ablation; pr laparoscopy surg cholecystectomy (N/A, 9/24/2019); and Cardiac surgery. ROS:  Review of Systems   - 13 point ROS was performed and all are normal unless stated in the history above. - Nursing note reviewed. Vitals reviewed. - Orders placed by myself and/or advanced practitioner / resident. PE:   Vitals:    07/31/23 1956   BP: 139/54   BP Location: Right arm   Pulse: 85   Resp: 18   Temp: 98.6 °F (37 °C)   TempSrc: Temporal   SpO2: 100%     Vitals reviewed by me. Patient has area of erythema with some blistering on the aforementioned area. Bedside ultrasound does not show any deeper fluid abscess. Some superficial blisters only. Cobblestoning consistent with cellulitis.   Unless otherwise specified above:    General: VS reviewed  Appears in NAD    Head: Normocephalic, atraumatic  Eyes: EOM-I.     ENT: Atraumatic external nose and ears. No drooling. Neck: No JVD. CV: No pallor noted  Lungs:   No tachypnea  No respiratory distress    Abdomen:  Soft, non-tender, non-distended    MSK:   No obvious deformity    Skin: Dry, intact. No obvious rash. Psychiatric/Behavioral: Appropriate mood and affect   Exam: deferred    Physical Exam     Procedures   A:  - Nursing note reviewed.                       No orders to display     Orders Placed This Encounter   Procedures   • CBC and differential   • Basic metabolic panel   • Lactic acid, plasma (w/reflex if result > 2.0)   • Insert peripheral IV     Labs Reviewed   CBC AND DIFFERENTIAL - Abnormal       Result Value Ref Range Status    WBC 7.36  4.31 - 10.16 Thousand/uL Final    RBC 4.93  3.88 - 5.62 Million/uL Final    Hemoglobin 14.8  12.0 - 17.0 g/dL Final    Hematocrit 44.5  36.5 - 49.3 % Final    MCV 90  82 - 98 fL Final    MCH 30.0  26.8 - 34.3 pg Final    MCHC 33.3  31.4 - 37.4 g/dL Final    RDW 12.5  11.6 - 15.1 % Final    MPV 10.3  8.9 - 12.7 fL Final    Platelets 180  325 - 390 Thousands/uL Final    nRBC 0  /100 WBCs Final    Neutrophils Relative 53  43 - 75 % Final    Immat GRANS % 0  0 - 2 % Final    Lymphocytes Relative 22  14 - 44 % Final    Monocytes Relative 20 (*) 4 - 12 % Final    Eosinophils Relative 5  0 - 6 % Final    Basophils Relative 0  0 - 1 % Final    Neutrophils Absolute 3.80  1.85 - 7.62 Thousands/µL Final    Immature Grans Absolute 0.02  0.00 - 0.20 Thousand/uL Final    Lymphocytes Absolute 1.63  0.60 - 4.47 Thousands/µL Final    Monocytes Absolute 1.49 (*) 0.17 - 1.22 Thousand/µL Final    Eosinophils Absolute 0.39  0.00 - 0.61 Thousand/µL Final    Basophils Absolute 0.03  0.00 - 0.10 Thousands/µL Final   LACTIC ACID, PLASMA (W/REFLEX IF RESULT > 2.0) - Normal    LACTIC ACID 1.9  0.5 - 2.0 mmol/L Final Narrative:     Result may be elevated if tourniquet was used during collection. BASIC METABOLIC PANEL    Sodium 044  135 - 147 mmol/L Final    Potassium 4.0  3.5 - 5.3 mmol/L Final    Chloride 103  96 - 108 mmol/L Final    CO2 28  21 - 32 mmol/L Final    ANION GAP 8  mmol/L Final    BUN 11  5 - 25 mg/dL Final    Creatinine 0.98  0.60 - 1.30 mg/dL Final    Comment: Standardized to IDMS reference method    Glucose 125  65 - 140 mg/dL Final    Comment: If the patient is fasting, the ADA then defines impaired fasting glucose as > 100 mg/dL and diabetes as > or equal to 123 mg/dL. Calcium 9.6  8.4 - 10.2 mg/dL Final    eGFR 85  ml/min/1.73sq m Final    Narrative:     [place] guidelines for Chronic Kidney Disease (CKD):   •  Stage 1 with normal or high GFR (GFR > 90 mL/min/1.73 square meters)  •  Stage 2 Mild CKD (GFR = 60-89 mL/min/1.73 square meters)  •  Stage 3A Moderate CKD (GFR = 45-59 mL/min/1.73 square meters)  •  Stage 3B Moderate CKD (GFR = 30-44 mL/min/1.73 square meters)  •  Stage 4 Severe CKD (GFR = 15-29 mL/min/1.73 square meters)  •  Stage 5 End Stage CKD (GFR <15 mL/min/1.73 square meters)  Note: GFR calculation is accurate only with a steady state creatinine         Final Diagnosis:  1. Cellulitis        P:  -Patient without any systemic symptoms. I discussed with him possible treatment courses. He has only been on antibiotics for short period of time and has no progressing symptoms. We will provide blood work to evaluate for significant abnormalities  - Laboratory analysis was unremarkable. Patient was provided with Rocephin while here in the emergency department. I discussed with him changing antibiotics versus admission      Unless otherwise noted the patient's medications were reviewed and they should continue as directed.     Medications   sodium chloride 0.9 % bolus 1,000 mL (0 mL Intravenous Stopped 7/31/23 2102)   cefTRIAXone (ROCEPHIN) IVPB (premix in dextrose) 1,000 mg 50 mL (0 mg Intravenous Stopped 7/31/23 2102)     Time reflects when diagnosis was documented in both MDM as applicable and the Disposition within this note     Time User Action Codes Description Comment    7/31/2023  8:48 PM Monica Mireles Add [L03.90] Cellulitis       ED Disposition     ED Disposition   Discharge    Condition   Stable    Date/Time   Mon Jul 31, 2023  8:48 PM    Comment   Anastasiagarrison Kruse discharge to home/self care.                Follow-up Information     Follow up With Specialties Details Why [address] [facility], DO Internal Medicine   [address] [place] [address]  [phone]          Discharge Medication List as of 7/31/2023  8:49 PM      START taking these medications    Details   cephalexin (KEFLEX) 500 mg capsule Take 1 capsule (500 mg total) by mouth every 6 (six) hours for 7 days, Starting Mon 7/31/2023, Until Mon 8/7/2023, Normal      sulfamethoxazole-trimethoprim (BACTRIM DS) 800-160 mg per tablet Take 1 tablet by mouth 2 (two) times a day for 7 days smx-tmp DS (BACTRIM) 800-160 mg tabs (1tab q12 D10), Starting Mon 7/31/2023, Until Mon 8/7/2023, Normal         CONTINUE these medications which have NOT CHANGED    Details   aspirin 81 MG tablet Take 1 tablet by mouth daily, Historical Med      omeprazole (PriLOSEC OTC) 20 MG tablet Take 1 tablet (20 mg total) by mouth daily, Starting Fri 10/30/2020, Normal      tamsulosin (FLOMAX) 0.4 mg Take 1 capsule by mouth Daily, Starting Mon 8/2/2021, Historical Med      Accu-Chek FastClix Lancets MISC Test blood sugar once daily for fluctuating blood sugars, Normal      aluminum-magnesium hydroxide-simethicone (MAALOX MAX) 400-400-40 MG/5ML suspension Take 10 mL by mouth every 6 (six) hours as needed for indigestion or heartburn, Starting Tue 10/4/2022, Normal      Blood Glucose Monitoring Suppl (Accu-Chek Joya Plus) w/Device KIT Test blood sugar once daily for fluctuating blood sugars, Normal cyclobenzaprine (FLEXERIL) 5 mg tablet Take 1 tablet (5 mg total) by mouth 3 (three) times a day as needed for muscle spasms, Starting Sun 9/5/2021, Normal      esomeprazole (NexIUM) 20 mg capsule Take 20 mg by mouth every morning before breakfast, Historical Med      glucose blood (Accu-Chek Joya Plus) test strip Test blood sugar once daily for fluctuating blood sugars, Normal      meloxicam (MOBIC) 7.5 mg tablet Take 7.5 mg by mouth 2 (two) times a day, Starting Fri 9/17/2021, Historical Med      metFORMIN (GLUCOPHAGE-XR) 750 mg 24 hr tablet Take 1 tablet in AM for x 2 weeks, if tolerating increase to 2 tablets daily in AM, Normal      naproxen (Naprosyn) 500 mg tablet Take 1 tablet (500 mg total) by mouth 2 (two) times a day with meals, Starting Sun 6/4/2023, Normal      sucralfate (CARAFATE) 1 g tablet Take 1 tablet (1 g total) by mouth 2 (two) times a day as needed (Abdominal pain), Starting Tue 10/4/2022, Normal      terbinafine (LamISIL) 250 mg tablet Take 250 mg by mouth daily With meal, Starting u 6/1/2023, Historical Med         STOP taking these medications       clindamycin (CLEOCIN) 300 MG capsule Comments:   Reason for Stopping:             No discharge procedures on file. Prior to Admission Medications   Prescriptions Last Dose Informant Patient Reported? Taking?    Accu-Chek FastClix Lancets MISC  Self No No   Sig: Test blood sugar once daily for fluctuating blood sugars   Blood Glucose Monitoring Suppl (Accu-Chek Joya Plus) w/Device KIT  Self No No   Sig: Test blood sugar once daily for fluctuating blood sugars   Patient not taking: Reported on 3/30/2021   aluminum-magnesium hydroxide-simethicone (MAALOX MAX) 400-400-40 MG/5ML suspension   No No   Sig: Take 10 mL by mouth every 6 (six) hours as needed for indigestion or heartburn   aspirin 81 MG tablet 7/31/2023 Self Yes Yes   Sig: Take 1 tablet by mouth daily   clindamycin (CLEOCIN) 300 MG capsule   No No   Sig: Take 1 capsule (300 mg total) by mouth 3 (three) times a day for 7 days   cyclobenzaprine (FLEXERIL) 5 mg tablet  Self No No   Sig: Take 1 tablet (5 mg total) by mouth 3 (three) times a day as needed for muscle spasms   esomeprazole (NexIUM) 20 mg capsule  Self Yes No   Sig: Take 20 mg by mouth every morning before breakfast   glucose blood (Accu-Chek Joya Plus) test strip  Self No No   Sig: Test blood sugar once daily for fluctuating blood sugars   Patient not taking: Reported on 3/30/2021   meloxicam (MOBIC) 7.5 mg tablet  Self Yes No   Sig: Take 7.5 mg by mouth 2 (two) times a day   metFORMIN (GLUCOPHAGE-XR) 750 mg 24 hr tablet  Self No No   Sig: Take 1 tablet in AM for x 2 weeks, if tolerating increase to 2 tablets daily in AM   naproxen (Naprosyn) 500 mg tablet   No No   Sig: Take 1 tablet (500 mg total) by mouth 2 (two) times a day with meals   omeprazole (PriLOSEC OTC) 20 MG tablet 7/31/2023 Self No Yes   Sig: Take 1 tablet (20 mg total) by mouth daily   sucralfate (CARAFATE) 1 g tablet   No No   Sig: Take 1 tablet (1 g total) by mouth 2 (two) times a day as needed (Abdominal pain)   tamsulosin (FLOMAX) 0.4 mg 7/31/2023 Self Yes Yes   Sig: Take 1 capsule by mouth Daily   terbinafine (LamISIL) 250 mg tablet   Yes No   Sig: Take 250 mg by mouth daily With meal      Facility-Administered Medications: None       Portions of the record may have been created with voice recognition software. Occasional wrong word or "sound a like" substitutions may have occurred due to the inherent limitations of voice recognition software. Read the chart carefully and recognize, using context, where substitutions have occurred.     Electronically signed by:  Drexel Forts, MD Meredith Dakins, MD  07/31/23 1250

## 2024-02-21 ENCOUNTER — APPOINTMENT (OUTPATIENT)
Dept: LAB | Facility: CLINIC | Age: 59
End: 2024-02-21
Payer: MEDICARE

## 2024-02-21 DIAGNOSIS — I10 HYPERTENSION, UNSPECIFIED TYPE: ICD-10-CM

## 2024-02-21 DIAGNOSIS — E78.2 MIXED HYPERLIPIDEMIA: ICD-10-CM

## 2024-02-21 DIAGNOSIS — E11.9 TYPE 2 DIABETES MELLITUS WITHOUT COMPLICATION, UNSPECIFIED WHETHER LONG TERM INSULIN USE (HCC): ICD-10-CM

## 2024-02-21 LAB
ALBUMIN SERPL BCP-MCNC: 4.1 G/DL (ref 3.5–5)
ALP SERPL-CCNC: 61 U/L (ref 34–104)
ALT SERPL W P-5'-P-CCNC: 33 U/L (ref 7–52)
ANION GAP SERPL CALCULATED.3IONS-SCNC: 10 MMOL/L
AST SERPL W P-5'-P-CCNC: 20 U/L (ref 13–39)
BACTERIA UR QL AUTO: ABNORMAL /HPF
BILIRUB DIRECT SERPL-MCNC: 0.06 MG/DL (ref 0–0.2)
BILIRUB SERPL-MCNC: 0.43 MG/DL (ref 0.2–1)
BILIRUB UR QL STRIP: NEGATIVE
BUN SERPL-MCNC: 15 MG/DL (ref 5–25)
CALCIUM SERPL-MCNC: 8.8 MG/DL (ref 8.4–10.2)
CHLORIDE SERPL-SCNC: 106 MMOL/L (ref 96–108)
CHOLEST SERPL-MCNC: 211 MG/DL
CLARITY UR: CLEAR
CO2 SERPL-SCNC: 25 MMOL/L (ref 21–32)
COLOR UR: ABNORMAL
CREAT SERPL-MCNC: 0.86 MG/DL (ref 0.6–1.3)
ERYTHROCYTE [DISTWIDTH] IN BLOOD BY AUTOMATED COUNT: 12.7 % (ref 11.6–15.1)
EST. AVERAGE GLUCOSE BLD GHB EST-MCNC: 140 MG/DL
GFR SERPL CREATININE-BSD FRML MDRD: 96 ML/MIN/1.73SQ M
GLUCOSE P FAST SERPL-MCNC: 111 MG/DL (ref 65–99)
GLUCOSE UR STRIP-MCNC: NEGATIVE MG/DL
HBA1C MFR BLD: 6.5 %
HCT VFR BLD AUTO: 41.9 % (ref 36.5–49.3)
HDLC SERPL-MCNC: 38 MG/DL
HGB BLD-MCNC: 14.2 G/DL (ref 12–17)
HGB UR QL STRIP.AUTO: NEGATIVE
KETONES UR STRIP-MCNC: NEGATIVE MG/DL
LDLC SERPL CALC-MCNC: 107 MG/DL (ref 0–100)
LEUKOCYTE ESTERASE UR QL STRIP: NEGATIVE
MCH RBC QN AUTO: 29.6 PG (ref 26.8–34.3)
MCHC RBC AUTO-ENTMCNC: 33.9 G/DL (ref 31.4–37.4)
MCV RBC AUTO: 87 FL (ref 82–98)
MUCOUS THREADS UR QL AUTO: ABNORMAL
NITRITE UR QL STRIP: NEGATIVE
NON-SQ EPI CELLS URNS QL MICRO: ABNORMAL /HPF
NONHDLC SERPL-MCNC: 173 MG/DL
PH UR STRIP.AUTO: 6.5 [PH]
PLATELET # BLD AUTO: 207 THOUSANDS/UL (ref 149–390)
PMV BLD AUTO: 11.1 FL (ref 8.9–12.7)
POTASSIUM SERPL-SCNC: 3.9 MMOL/L (ref 3.5–5.3)
PROT SERPL-MCNC: 6.7 G/DL (ref 6.4–8.4)
PROT UR STRIP-MCNC: ABNORMAL MG/DL
RBC # BLD AUTO: 4.8 MILLION/UL (ref 3.88–5.62)
RBC #/AREA URNS AUTO: ABNORMAL /HPF
SODIUM SERPL-SCNC: 141 MMOL/L (ref 135–147)
SP GR UR STRIP.AUTO: 1.02 (ref 1–1.03)
TRIGL SERPL-MCNC: 331 MG/DL
UROBILINOGEN UR STRIP-ACNC: <2 MG/DL
WBC # BLD AUTO: 5.63 THOUSAND/UL (ref 4.31–10.16)
WBC #/AREA URNS AUTO: ABNORMAL /HPF

## 2024-02-21 PROCEDURE — 36415 COLL VENOUS BLD VENIPUNCTURE: CPT

## 2024-02-21 PROCEDURE — 80061 LIPID PANEL: CPT

## 2024-02-21 PROCEDURE — 82570 ASSAY OF URINE CREATININE: CPT

## 2024-02-21 PROCEDURE — 82043 UR ALBUMIN QUANTITATIVE: CPT

## 2024-02-21 PROCEDURE — 83036 HEMOGLOBIN GLYCOSYLATED A1C: CPT

## 2024-02-21 PROCEDURE — 80076 HEPATIC FUNCTION PANEL: CPT

## 2024-02-21 PROCEDURE — 81001 URINALYSIS AUTO W/SCOPE: CPT

## 2024-02-21 PROCEDURE — 85027 COMPLETE CBC AUTOMATED: CPT

## 2024-02-21 PROCEDURE — 80048 BASIC METABOLIC PNL TOTAL CA: CPT

## 2024-02-22 LAB
CREAT UR-MCNC: 148.9 MG/DL
MICROALBUMIN UR-MCNC: 8.9 MG/L
MICROALBUMIN/CREAT 24H UR: 6 MG/G CREATININE (ref 0–30)

## 2024-03-22 RX ORDER — ATORVASTATIN CALCIUM 10 MG/1
1 TABLET, FILM COATED ORAL DAILY
COMMUNITY
Start: 2024-02-27

## 2024-03-22 RX ORDER — LISINOPRIL 10 MG/1
1 TABLET ORAL DAILY
COMMUNITY
Start: 2024-02-27

## 2024-03-26 ENCOUNTER — OFFICE VISIT (OUTPATIENT)
Dept: FAMILY MEDICINE CLINIC | Facility: CLINIC | Age: 59
End: 2024-03-26
Payer: MEDICARE

## 2024-03-26 VITALS
TEMPERATURE: 98 F | WEIGHT: 223 LBS | OXYGEN SATURATION: 97 % | DIASTOLIC BLOOD PRESSURE: 100 MMHG | SYSTOLIC BLOOD PRESSURE: 160 MMHG | HEART RATE: 65 BPM | BODY MASS INDEX: 38.07 KG/M2 | HEIGHT: 64 IN

## 2024-03-26 DIAGNOSIS — Z86.79 STATUS POST RADIOFREQUENCY ABLATION (RFA) OPERATION FOR ARRHYTHMIA: ICD-10-CM

## 2024-03-26 DIAGNOSIS — Z12.11 SCREENING FOR MALIGNANT NEOPLASM OF COLON: ICD-10-CM

## 2024-03-26 DIAGNOSIS — I47.19 AVNRT (AV NODAL RE-ENTRY TACHYCARDIA): ICD-10-CM

## 2024-03-26 DIAGNOSIS — E66.01 SEVERE OBESITY (BMI 35.0-35.9 WITH COMORBIDITY) (HCC): ICD-10-CM

## 2024-03-26 DIAGNOSIS — Z98.890 STATUS POST RADIOFREQUENCY ABLATION (RFA) OPERATION FOR ARRHYTHMIA: ICD-10-CM

## 2024-03-26 DIAGNOSIS — N40.1 BENIGN PROSTATIC HYPERPLASIA WITH LOWER URINARY TRACT SYMPTOMS, SYMPTOM DETAILS UNSPECIFIED: ICD-10-CM

## 2024-03-26 DIAGNOSIS — I10 PRIMARY HYPERTENSION: ICD-10-CM

## 2024-03-26 DIAGNOSIS — E11.9 TYPE 2 DIABETES MELLITUS WITHOUT COMPLICATION, WITHOUT LONG-TERM CURRENT USE OF INSULIN (HCC): Primary | ICD-10-CM

## 2024-03-26 PROBLEM — E11.65 TYPE 2 DIABETES MELLITUS WITH HYPERGLYCEMIA, WITHOUT LONG-TERM CURRENT USE OF INSULIN (HCC): Status: ACTIVE | Noted: 2024-03-26

## 2024-03-26 PROBLEM — R00.1 SINUS BRADYCARDIA: Status: RESOLVED | Noted: 2020-02-25 | Resolved: 2024-03-26

## 2024-03-26 PROCEDURE — 99204 OFFICE O/P NEW MOD 45 MIN: CPT | Performed by: FAMILY MEDICINE

## 2024-03-26 NOTE — ASSESSMENT & PLAN NOTE
Uncontrolled.  /100.  Has not been compliant with lisinopril.  Advised compliance.  Effects of uncontrolled hypertension discussed today.  He will restart lisinopril 10 mg daily.

## 2024-03-26 NOTE — ASSESSMENT & PLAN NOTE
Lab Results   Component Value Date    HGBA1C 6.5 (H) 02/21/2024   Controlled on recent A1c.  Continue metformin.  He would like to trial GLP-1 agonist for diabetes and added benefit of weight loss.  Advised to contact insurance and see if this is covered and also find pharmacy that has it in stock.  We will discuss at upcoming visit.

## 2024-03-26 NOTE — ASSESSMENT & PLAN NOTE
Controlled and stable after ablation.  Not on any medication.  Follows with cardiology at [facility].

## 2024-03-26 NOTE — ASSESSMENT & PLAN NOTE
On Flomax.  Reports nocturia despite therapy.  Would like to see urologist.  Referral placed today.

## 2024-03-26 NOTE — ASSESSMENT & PLAN NOTE
>>ASSESSMENT AND PLAN FOR PAROXYSMAL SVT (SUPRAVENTRICULAR TACHYCARDIA) WRITTEN ON 3/26/2024  3:07 PM BY EWA CHRISTIANSON MD    Controlled and stable after ablation.  Not on any medication.  Follows with cardiology at [facility].

## 2024-03-26 NOTE — PROGRESS NOTES
Name: Rafael Bradford      : 1965      MRN: [id number]  Encounter Provider: Skip Christianson MD  Encounter Date: 3/26/2024   Encounter department: [facility] AT [facility]    Assessment & Plan     1. Type 2 diabetes mellitus without complication, without long-term current use of insulin (HCC)  Assessment & Plan:    Lab Results   Component Value Date    HGBA1C 6.5 (H) 2024   Controlled on recent A1c.  Continue metformin.  He would like to trial GLP-1 agonist for diabetes and added benefit of weight loss.  Advised to contact insurance and see if this is covered and also find pharmacy that has it in stock.  We will discuss at upcoming visit.      2. Primary hypertension  Assessment & Plan:  Uncontrolled.  /100.  Has not been compliant with lisinopril.  Advised compliance.  Effects of uncontrolled hypertension discussed today.  He will restart lisinopril 10 mg daily.      3. AVNRT (AV ebony re-entry tachycardia)  Assessment & Plan:  >>ASSESSMENT AND PLAN FOR PAROXYSMAL SVT (SUPRAVENTRICULAR TACHYCARDIA) WRITTEN ON 3/26/2024  3:07 PM BY SKIP CHRISTIANSON MD    Controlled and stable after ablation.  Not on any medication.  Follows with cardiology at [facility].        4. 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    5. Severe obesity (BMI 35.0-35.9 with comorbidity)   Assessment & Plan:  Encouraged dietary and lifestyle modification.  Considering starting GLP-1 agonist at next visit.      6. Benign prostatic hyperplasia with lower urinary tract symptoms, symptom details unspecified  Assessment & Plan:  On Flomax.  Reports nocturia despite therapy.  Would like to see urologist.  Referral placed today.    Orders:  -     Ambulatory Referral to Urology; Future    7. Screening for malignant neoplasm of colon  -     Ambulatory Referral to Gastroenterology; Future           Subjective      Patient presents to the office today to  establish care.  Previous PCP was at [facility].  He reports regular follow-up with previous PCP.  Last visit was a few months ago.  Patient has history of diabetes and hypertension.  He is supposed to be on lisinopril but has not been taking his medication.  He says he has been trying garlic pills instead.  Does feel fatigued at times.  No chest pain or shortness of breath.  He does have history of an arrhythmia he says.  He follows with cardiology but everything has been stable after he had an ablation 5 years ago.  He is cardiology yearly.  Denies any palpitations or symptoms.  His diabetes is pretty well-controlled.  He is on metformin.  He would like to try to get on injectable meds for diabetes but also help for weight loss.  He is not sure if his insurance will cover.  He is tolerating metformin well and having no issues.  Had recent lab work which showed good A1c.  He has a history of enlarged prostate.  He is on Flomax which has helped but he still is having symptoms.  He would like to see a urologist to see what else can be done.      Review of Systems   All other systems reviewed and are negative.      Current Outpatient Medications on File Prior to Visit   Medication Sig    Accu-Chek FastClix Lancets MISC Test blood sugar once daily for fluctuating blood sugars    aspirin 81 MG tablet Take 1 tablet by mouth daily    atorvastatin (LIPITOR) 10 mg tablet Take 1 tablet by mouth daily    esomeprazole (NexIUM) 20 mg capsule Take 20 mg by mouth every morning before breakfast    glucose blood (Accu-Chek Joya Plus) test strip Test blood sugar once daily for fluctuating blood sugars    metFORMIN (GLUCOPHAGE-XR) 750 mg 24 hr tablet Take 1 tablet in AM for x 2 weeks, if tolerating increase to 2 tablets daily in AM    omeprazole (PriLOSEC OTC) 20 MG tablet Take 1 tablet (20 mg total) by mouth daily    tamsulosin (FLOMAX) 0.4 mg Take 1 capsule by mouth Daily    Blood Glucose Monitoring Suppl  "(Accu-Chek Joya Plus) w/Device KIT Test blood sugar once daily for fluctuating blood sugars (Patient not taking: Reported on 3/30/2021)    lisinopril (ZESTRIL) 10 mg tablet Take 1 tablet by mouth daily (Patient not taking: Reported on 3/26/2024)    [DISCONTINUED] aluminum-magnesium hydroxide-simethicone (MAALOX MAX) 400-400-40 MG/5ML suspension Take 10 mL by mouth every 6 (six) hours as needed for indigestion or heartburn (Patient not taking: Reported on 3/26/2024)    [DISCONTINUED] cyclobenzaprine (FLEXERIL) 5 mg tablet Take 1 tablet (5 mg total) by mouth 3 (three) times a day as needed for muscle spasms (Patient not taking: Reported on 3/26/2024)    [DISCONTINUED] meclizine (ANTIVERT) 25 mg tablet Take 25 mg by mouth Three times daily as needed (Patient not taking: Reported on 3/26/2024)    [DISCONTINUED] meloxicam (MOBIC) 7.5 mg tablet Take 7.5 mg by mouth 2 (two) times a day (Patient not taking: Reported on 3/26/2024)    [DISCONTINUED] naproxen (Naprosyn) 500 mg tablet Take 1 tablet (500 mg total) by mouth 2 (two) times a day with meals (Patient not taking: Reported on 3/26/2024)    [DISCONTINUED] sucralfate (CARAFATE) 1 g tablet Take 1 tablet (1 g total) by mouth 2 (two) times a day as needed (Abdominal pain) (Patient not taking: Reported on 3/26/2024)    [DISCONTINUED] terbinafine (LamISIL) 250 mg tablet Take 250 mg by mouth daily With meal (Patient not taking: Reported on 3/26/2024)    [DISCONTINUED] tiZANidine (ZANAFLEX) 2 mg tablet take 1 tablet by oral route qhs prn neck pain (Patient not taking: Reported on 3/26/2024)       Objective     /100 (BP Location: Left arm, Patient Position: Sitting, Cuff Size: Standard)   Pulse 65   Temp 98 °F (36.7 °C) (Tympanic)   Ht 5' 4\" (1.626 m)   Wt 101 kg (223 lb)   SpO2 97%   BMI 38.28 kg/m²     Physical Exam  Vitals and nursing note reviewed.   Constitutional:       General: He is not in acute distress.     Appearance: Normal appearance. He is not " ill-appearing, toxic-appearing or diaphoretic.   Eyes:      General:         Right eye: No discharge.         Left eye: No discharge.      Extraocular Movements: Extraocular movements intact.      Conjunctiva/sclera: Conjunctivae normal.   Cardiovascular:      Rate and Rhythm: Normal rate.   Pulmonary:      Effort: Pulmonary effort is normal.   Musculoskeletal:      Cervical back: Normal range of motion and neck supple.   Neurological:      Mental Status: He is alert and oriented to person, place, and time.   Psychiatric:         Mood and Affect: Mood normal.         Behavior: Behavior normal.         Thought Content: Thought content normal.         Judgment: Judgment normal.       Skip Clifford MD

## 2024-04-03 ENCOUNTER — PREP FOR PROCEDURE (OUTPATIENT)
Age: 59
End: 2024-04-03

## 2024-04-03 ENCOUNTER — TELEPHONE (OUTPATIENT)
Age: 59
End: 2024-04-03

## 2024-04-03 DIAGNOSIS — Z12.11 SCREENING FOR COLON CANCER: Primary | ICD-10-CM

## 2024-04-03 NOTE — TELEPHONE ENCOUNTER
Scheduled date of colonoscopy (as of today): 5/2/24  Physician performing colonoscopy: Kassie   Location of colonoscopy: [facility]   Bowel prep reviewed with patient: miralax and dulcolax  Instructions reviewed with patient by: mail - request sent to   Clearances: n/a

## 2024-04-03 NOTE — TELEPHONE ENCOUNTER
04/03/24  Screened by: Deanne Elaine    Referring Provider Dr. Clifford    Pre- Screening:     Body mass index is 38.28 kg/m².  Has patient been referred for a routine screening Colonoscopy? yes  Is the patient between 45-75 years old? yes      Previous Colonoscopy no   If yes:    Date:     Facility:     Reason:       Does the patient want to see a Gastroenterologist prior to their procedure OR are they having any GI symptoms? no    Has the patient been hospitalized or had abdominal surgery in the past 6 months? no    Does the patient use supplemental oxygen? no    Does the patient take Coumadin, Lovenox, Plavix, Elliquis, Xarelto, or other blood thinning medication? no    Has the patient had a stroke, cardiac event, or stent placed in the past year? no      If patient is between 45yrs - 49yrs, please advise patient that we will have to confirm benefits & coverage with their insurance company for a routine screening colonoscopy.

## 2024-04-29 ENCOUNTER — TELEPHONE (OUTPATIENT)
Age: 59
End: 2024-04-29

## 2024-04-29 NOTE — TELEPHONE ENCOUNTER
Pt calling to ask about his prep instructions, they were sent via postal mail but Pt says he never received them. Cleo emailed him a new copy so he has them for his scope on 05.02.24

## 2024-05-01 ENCOUNTER — TELEPHONE (OUTPATIENT)
Age: 59
End: 2024-05-01

## 2024-05-01 NOTE — TELEPHONE ENCOUNTER
Patient contacted office with procedure questions. All questions answered, patient expressed understanding.

## 2024-05-02 ENCOUNTER — ANESTHESIA (OUTPATIENT)
Dept: GASTROENTEROLOGY | Facility: HOSPITAL | Age: 59
End: 2024-05-02

## 2024-05-02 ENCOUNTER — ANESTHESIA EVENT (OUTPATIENT)
Dept: GASTROENTEROLOGY | Facility: HOSPITAL | Age: 59
End: 2024-05-02

## 2024-05-02 ENCOUNTER — HOSPITAL ENCOUNTER (OUTPATIENT)
Dept: GASTROENTEROLOGY | Facility: HOSPITAL | Age: 59
Setting detail: OUTPATIENT SURGERY
Discharge: HOME | End: 2024-05-02
Attending: INTERNAL MEDICINE
Payer: MEDICARE

## 2024-05-02 VITALS
SYSTOLIC BLOOD PRESSURE: 126 MMHG | BODY MASS INDEX: 38.07 KG/M2 | HEART RATE: 55 BPM | OXYGEN SATURATION: 98 % | TEMPERATURE: 98.1 F | HEIGHT: 64 IN | DIASTOLIC BLOOD PRESSURE: 78 MMHG | WEIGHT: 223 LBS | RESPIRATION RATE: 20 BRPM

## 2024-05-02 DIAGNOSIS — Z12.11 SCREENING FOR COLON CANCER: ICD-10-CM

## 2024-05-02 LAB — GLUCOSE SERPL-MCNC: 108 MG/DL (ref 65–140)

## 2024-05-02 PROCEDURE — 82948 REAGENT STRIP/BLOOD GLUCOSE: CPT

## 2024-05-02 PROCEDURE — G0121 COLON CA SCRN NOT HI RSK IND: HCPCS | Performed by: INTERNAL MEDICINE

## 2024-05-02 RX ORDER — ACETAMINOPHEN 325 MG/1
650 TABLET ORAL ONCE
Status: COMPLETED | OUTPATIENT
Start: 2024-05-02 | End: 2024-05-02

## 2024-05-02 RX ORDER — SODIUM CHLORIDE, SODIUM LACTATE, POTASSIUM CHLORIDE, CALCIUM CHLORIDE 600; 310; 30; 20 MG/100ML; MG/100ML; MG/100ML; MG/100ML
INJECTION, SOLUTION INTRAVENOUS CONTINUOUS PRN
Status: DISCONTINUED | OUTPATIENT
Start: 2024-05-02 | End: 2024-05-02

## 2024-05-02 RX ORDER — PROPOFOL 10 MG/ML
INJECTION, EMULSION INTRAVENOUS AS NEEDED
Status: DISCONTINUED | OUTPATIENT
Start: 2024-05-02 | End: 2024-05-02

## 2024-05-02 RX ADMIN — PROPOFOL 20 MG: 10 INJECTION, EMULSION INTRAVENOUS at 07:47

## 2024-05-02 RX ADMIN — SODIUM CHLORIDE, SODIUM LACTATE, POTASSIUM CHLORIDE, AND CALCIUM CHLORIDE: .6; .31; .03; .02 INJECTION, SOLUTION INTRAVENOUS at 07:19

## 2024-05-02 RX ADMIN — PROPOFOL 50 MG: 10 INJECTION, EMULSION INTRAVENOUS at 07:32

## 2024-05-02 RX ADMIN — ACETAMINOPHEN 325 MG: 325 TABLET ORAL at 08:23

## 2024-05-02 RX ADMIN — PROPOFOL 150 MG: 10 INJECTION, EMULSION INTRAVENOUS at 07:27

## 2024-05-02 RX ADMIN — PROPOFOL 50 MG: 10 INJECTION, EMULSION INTRAVENOUS at 07:37

## 2024-05-02 RX ADMIN — PROPOFOL 50 MG: 10 INJECTION, EMULSION INTRAVENOUS at 07:42

## 2024-05-02 NOTE — ANESTHESIA PREPROCEDURE EVALUATION
Procedure:  COLONOSCOPY    Relevant Problems   CARDIO   (+) AVNRT (AV ebony re-entry tachycardia)   (+) Primary hypertension      ENDO   (+) Type 2 diabetes mellitus without complication, without long-term current use of insulin (HCC)      GI/HEPATIC   (+) GERD without esophagitis      /RENAL   (+) Benign prostatic hyperplasia with lower urinary tract symptoms      PULMONARY   (+) TRES (obstructive sleep apnea)             Anesthesia Plan  ASA Score- 3     Anesthesia Type- IV sedation with anesthesia with ASA Monitors.         Additional Monitors:     Airway Plan:            Plan Factors-    Chart reviewed.                      Induction- intravenous.    Postoperative Plan-     Informed Consent- Anesthetic plan and risks discussed with patient.  I personally reviewed this patient with the CRNA. Discussed and agreed on the Anesthesia Plan with the CRNA..

## 2024-05-02 NOTE — H&P
History and Physical - [facility]  Rafael Bradford 58 y.o. male MRN: [id number]                  HPI: Rafael Bradford is a 58 y.o. year old male who presents for colonoscopy      REVIEW OF SYSTEMS: Per the HPI, and otherwise unremarkable.    Historical Information   Past Medical History:   Diagnosis Date    Acid reflux     Diabetes mellitus (HCC)     GERD (gastroesophageal reflux disease)     Hypertension     TRES (obstructive sleep apnea)     Panic disorder     SVT (supraventricular tachycardia)      Past Surgical History:   Procedure Laterality Date    AV NODE ABLATION      CARDIAC SURGERY      CYST REMOVAL      AL LAPAROSCOPY SURG CHOLECYSTECTOMY N/A 9/24/2019    Procedure: CHOLECYSTECTOMY LAPAROSCOPIC;  Surgeon: Rodrigo Genao DO;  Location: [facility];  Service: General     Social History   Social History     Substance and Sexual Activity   Alcohol Use Yes    Comment: social     Social History     Substance and Sexual Activity   Drug Use Never     Social History     Tobacco Use   Smoking Status Former    Current packs/day: 0.00    Types: Cigarettes    Quit date: 11/1/2013    Years since quitting: 10.5    Passive exposure: Never   Smokeless Tobacco Never     Family History   Problem Relation Age of Onset    Diabetes Mother     Kidney disease Mother     Liver cancer Mother     Heart disease Mother     Hypertension Mother     Thyroid disease Mother     Diabetes Sister     Hypertension Sister     Thyroid disease Father     Stroke Maternal Uncle        Meds/Allergies       Current Outpatient Medications:     aspirin 81 MG tablet    metFORMIN (GLUCOPHAGE-XR) 750 mg 24 hr tablet    tamsulosin (FLOMAX) 0.4 mg    Accu-Chek FastClix Lancets MISC    atorvastatin (LIPITOR) 10 mg tablet    Blood Glucose Monitoring Suppl (Accu-Chek Joya Plus) w/Device KIT    glucose blood (Accu-Chek Joya Plus) test strip    lisinopril (ZESTRIL) 10 mg tablet    omeprazole (PriLOSEC OTC) 20 MG tablet    No Known  "Allergies    Objective     /82   Pulse 55   Temp 98.3 °F (36.8 °C) (Temporal)   Resp 16   Ht 5' 4\" (1.626 m)   Wt 101 kg (223 lb)   SpO2 98%   BMI 38.28 kg/m²       PHYSICAL EXAM    Gen: NAD  Head: NCAT  CV: RRR  CHEST: Clear  ABD: soft, NT/ND  EXT: no edema      ASSESSMENT/PLAN:  This is a 58 y.o. year old male here for colonoscopy, and he is stable and optimized for his procedure.       "

## 2024-05-02 NOTE — ANESTHESIA POSTPROCEDURE EVALUATION
Post-Op Assessment Note    CV Status:  Stable  Pain Score: 0    Pain management: adequate       Mental Status:  Alert and awake   Hydration Status:  Euvolemic   PONV Controlled:  Controlled   Airway Patency:  Patent     Post Op Vitals Reviewed: Yes    No anethesia notable event occurred.    Staff: CRNA               BP   110/70   Temp      Pulse  55   Resp   16   SpO2   98

## 2024-05-06 ENCOUNTER — OFFICE VISIT (OUTPATIENT)
Dept: URGENT CARE | Facility: CLINIC | Age: 59
End: 2024-05-06
Payer: MEDICARE

## 2024-05-06 VITALS
DIASTOLIC BLOOD PRESSURE: 77 MMHG | TEMPERATURE: 98.2 F | SYSTOLIC BLOOD PRESSURE: 139 MMHG | OXYGEN SATURATION: 98 % | HEART RATE: 92 BPM | RESPIRATION RATE: 18 BRPM

## 2024-05-06 DIAGNOSIS — J06.9 ACUTE URI: Primary | ICD-10-CM

## 2024-05-06 LAB — S PYO AG THROAT QL: NEGATIVE

## 2024-05-06 PROCEDURE — 99213 OFFICE O/P EST LOW 20 MIN: CPT | Performed by: PHYSICIAN ASSISTANT

## 2024-05-06 PROCEDURE — 87880 STREP A ASSAY W/OPTIC: CPT | Performed by: PHYSICIAN ASSISTANT

## 2024-05-06 RX ORDER — DOXYCYCLINE 100 MG/1
100 TABLET ORAL 2 TIMES DAILY
Qty: 14 TABLET | Refills: 0 | Status: SHIPPED | OUTPATIENT
Start: 2024-05-06 | End: 2024-05-13

## 2024-05-06 RX ORDER — BENZONATATE 200 MG/1
200 CAPSULE ORAL 3 TIMES DAILY PRN
Qty: 20 CAPSULE | Refills: 0 | Status: SHIPPED | OUTPATIENT
Start: 2024-05-06

## 2024-05-06 NOTE — PROGRESS NOTES
"  Assessment/Plan    Acute URI [J06.9]  1. Acute URI  POCT rapid strepA    doxycycline (ADOXA) 100 MG tablet    benzonatate (TESSALON) 200 MG capsule            Subjective:     Patient ID: Rafael Bradford is a 58 y.o. male.      Reason For Visit / Chief Complaint  Chief Complaint   Patient presents with    Sore Throat    Cough     Started 3 weeks ago          Patient is a 58 year-old male presenting with a cough that began 3 weeks ago. He describes intermittent \"coughing fits.\" The cough is associated with a sore throat that started after the cough. He denies fevers but endorses night sweats that began 3 weeks ago. He is also experiencing fatigue. He has only tried sore throat lozenges. He was exposed to a family member with similar symptoms who required antibiotics.     Sore Throat   This is a new problem. The current episode started 1 to 4 weeks ago. The problem has been unchanged. There has been no fever. Associated symptoms include congestion, coughing and headaches. Pertinent negatives include no abdominal pain, ear pain, shortness of breath or vomiting.   Cough  This is a new problem. The current episode started 1 to 4 weeks ago. The problem has been unchanged. The problem occurs every few minutes. The cough is Non-productive. Associated symptoms include headaches and a sore throat. Pertinent negatives include no chest pain, chills, ear pain, fever, rash or shortness of breath. There is no history of asthma, bronchiectasis, bronchitis, COPD, emphysema, environmental allergies or pneumonia.         Past Medical History:   Diagnosis Date    Acid reflux     Diabetes mellitus (HCC)     GERD (gastroesophageal reflux disease)     Hypertension     TRES (obstructive sleep apnea)     Panic disorder     SVT (supraventricular tachycardia)        Past Surgical History:   Procedure Laterality Date    AV NODE ABLATION      CARDIAC SURGERY      CYST REMOVAL      MD LAPAROSCOPY SURG CHOLECYSTECTOMY N/A 9/24/2019    Procedure: " CHOLECYSTECTOMY LAPAROSCOPIC;  Surgeon: Rodrigo Genao DO;  Location: [facility];  Service: General       Family History   Problem Relation Age of Onset    Diabetes Mother     Kidney disease Mother     Liver cancer Mother     Heart disease Mother     Hypertension Mother     Thyroid disease Mother     Diabetes Sister     Hypertension Sister     Thyroid disease Father     Stroke Maternal Uncle        Review of Systems   Constitutional:  Negative for chills and fever.   HENT:  Positive for congestion and sore throat. Negative for ear pain.    Eyes:  Negative for pain and visual disturbance.   Respiratory:  Positive for cough. Negative for shortness of breath.    Cardiovascular:  Negative for chest pain and palpitations.   Gastrointestinal:  Negative for abdominal pain and vomiting.   Genitourinary:  Negative for dysuria and hematuria.   Musculoskeletal:  Negative for arthralgias and back pain.   Skin:  Negative for color change and rash.   Allergic/Immunologic: Negative for environmental allergies.   Neurological:  Positive for headaches. Negative for seizures and syncope.   All other systems reviewed and are negative.      Objective:    /77   Pulse 92   Temp 98.2 °F (36.8 °C)   Resp 18   SpO2 98%     Physical Exam  Constitutional:       General: He is not in acute distress.     Appearance: Normal appearance. He is normal weight. He is not ill-appearing, toxic-appearing or diaphoretic.   HENT:      Head: Normocephalic and atraumatic.      Right Ear: There is impacted cerumen.      Left Ear: There is impacted cerumen.      Nose: Congestion present.      Mouth/Throat:      Mouth: Mucous membranes are moist.      Pharynx: Posterior oropharyngeal erythema present.   Eyes:      Extraocular Movements: Extraocular movements intact.      Conjunctiva/sclera: Conjunctivae normal.      Pupils: Pupils are equal, round, and reactive to light.   Cardiovascular:      Rate and Rhythm: Normal rate and regular rhythm.       Heart sounds: No murmur heard.     No friction rub. No gallop.   Pulmonary:      Effort: Pulmonary effort is normal.      Breath sounds: No wheezing, rhonchi or rales.   Musculoskeletal:         General: Normal range of motion.      Cervical back: Normal range of motion and neck supple.   Lymphadenopathy:      Cervical: Cervical adenopathy present.   Skin:     General: Skin is warm and dry.   Neurological:      General: No focal deficit present.      Mental Status: He is alert and oriented to person, place, and time.   Psychiatric:         Mood and Affect: Mood normal.         Behavior: Behavior normal.

## 2024-05-10 ENCOUNTER — OFFICE VISIT (OUTPATIENT)
Dept: FAMILY MEDICINE CLINIC | Facility: CLINIC | Age: 59
End: 2024-05-10
Payer: MEDICARE

## 2024-05-10 VITALS
HEIGHT: 64 IN | DIASTOLIC BLOOD PRESSURE: 85 MMHG | OXYGEN SATURATION: 98 % | SYSTOLIC BLOOD PRESSURE: 120 MMHG | WEIGHT: 223.9 LBS | HEART RATE: 65 BPM | BODY MASS INDEX: 38.22 KG/M2

## 2024-05-10 DIAGNOSIS — I10 PRIMARY HYPERTENSION: ICD-10-CM

## 2024-05-10 DIAGNOSIS — E11.9 TYPE 2 DIABETES MELLITUS WITHOUT COMPLICATION, WITHOUT LONG-TERM CURRENT USE OF INSULIN (HCC): ICD-10-CM

## 2024-05-10 DIAGNOSIS — Z00.00 MEDICARE ANNUAL WELLNESS VISIT, SUBSEQUENT: Primary | ICD-10-CM

## 2024-05-10 DIAGNOSIS — Z12.5 SCREENING FOR MALIGNANT NEOPLASM OF PROSTATE: ICD-10-CM

## 2024-05-10 PROCEDURE — 99214 OFFICE O/P EST MOD 30 MIN: CPT | Performed by: FAMILY MEDICINE

## 2024-05-10 PROCEDURE — G0439 PPPS, SUBSEQ VISIT: HCPCS | Performed by: FAMILY MEDICINE

## 2024-05-10 NOTE — PROGRESS NOTES
Assessment and Plan:     Problem List Items Addressed This Visit       Type 2 diabetes mellitus without complication, without long-term current use of insulin (HCC)       Lab Results   Component Value Date    HGBA1C 6.5 (H) 02/21/2024   Controlled on recent A1c.  Continue metformin.           Relevant Orders    Hemoglobin A1C    Primary hypertension     Controlled.  Continue lisinopril.          Other Visit Diagnoses       Medicare annual wellness visit, subsequent    -  Primary    Screening for malignant neoplasm of prostate        Relevant Orders    PSA, Total Screen             Preventive health issues were discussed with patient, and age appropriate screening tests were ordered as noted in patient's After Visit Summary.  Personalized health advice and appropriate referrals for health education or preventive services given if needed, as noted in patient's After Visit Summary.     History of Present Illness:     Patient presents for a Medicare Wellness Visit    Patient presents office today for annual Medicare wellness visit and follow-up.  Has no acute concerns.  He recently had colonoscopy done reports everything was okay.  Has history of diabetes on metformin.  Tolerating metformin well no issues.  Denies any hypoglycemic or hypoglycemic episodes.  Blood pressures have been controlled at home.  He went to urgent care for cough earlier this month and was started on antibiotics.  He says the cough has not fully gone away but does feel like he is getting a little better.  No fever or chills.  Denies any shortness of breath.    At our last visit he said he was interested in injectable weight loss meds.  He was supposed to call his insurance to see if they are covered but he did not.  He says he really thought about it and is really not that interested at the moment.       Patient Care Team:  Skip Clifford MD as PCP - General (Family Medicine)  DO Mumtaz Chi MD     Review of Systems:      Review of Systems   All other systems reviewed and are negative.       Problem List:     Patient Active Problem List   Diagnosis    GERD without esophagitis    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    TRES (obstructive sleep apnea)    AVNRT (AV ebony re-entry tachycardia)    Severe obesity (BMI 35.0-35.9 with comorbidity) (HCC)    Type 2 diabetes mellitus without complication, without long-term current use of insulin (HCC)    Rotator cuff tendonitis, right    Lipoma of left thigh    Lipoma of right forearm    Lipoma of abdominal wall    Seborrheic keratosis of scalp    Primary hypertension    Benign prostatic hyperplasia with lower urinary tract symptoms      Past Medical and Surgical History:     Past Medical History:   Diagnosis Date    Acid reflux     Diabetes mellitus (HCC)     GERD (gastroesophageal reflux disease)     Hypertension     TRES (obstructive sleep apnea)     Panic disorder     SVT (supraventricular tachycardia)      Past Surgical History:   Procedure Laterality Date    AV NODE ABLATION      CARDIAC SURGERY      CYST REMOVAL      IA LAPAROSCOPY SURG CHOLECYSTECTOMY N/A 9/24/2019    Procedure: CHOLECYSTECTOMY LAPAROSCOPIC;  Surgeon: Rodrigo Genao DO;  Location: [facility];  Service: General      Family History:     Family History   Problem Relation Age of Onset    Diabetes Mother     Kidney disease Mother     Liver cancer Mother     Heart disease Mother     Hypertension Mother     Thyroid disease Mother     Diabetes Sister     Hypertension Sister     Thyroid disease Father     Stroke Maternal Uncle       Social History:     Social History     Socioeconomic History    Marital status: Single     Spouse name: None    Number of children: None    Years of education: None    Highest education level: None   Occupational History    None   Tobacco Use    Smoking status: Former     Current packs/day: 0.00     Types: Cigarettes     Quit date: 11/1/2013     Years  since quitting: 10.5     Passive exposure: Never    Smokeless tobacco: Never   Vaping Use    Vaping status: Never Used   Substance and Sexual Activity    Alcohol use: Yes     Comment: social    Drug use: Never    Sexual activity: None   Other Topics Concern    None   Social History Narrative    Uses seat belt     Social Determinants of Health     Financial Resource Strain: Not on file   Food Insecurity: No Food Insecurity (5/10/2024)    Hunger Vital Sign     Worried About Running Out of Food in the Last Year: Never true     Ran Out of Food in the Last Year: Never true   Transportation Needs: No Transportation Needs (5/10/2024)    PRAPARE - Transportation     Lack of Transportation (Medical): No     Lack of Transportation (Non-Medical): No   Physical Activity: Not on file   Stress: Not on file   Social Connections: Not on file   Intimate Partner Violence: Not on file   Housing Stability: Low Risk  (5/10/2024)    Housing Stability Vital Sign     Unable to Pay for Housing in the Last Year: No     Number of Places Lived in the Last Year: 0     Unstable Housing in the Last Year: No      Medications and Allergies:     Current Outpatient Medications   Medication Sig Dispense Refill    Accu-Chek FastClix Lancets MISC Test blood sugar once daily for fluctuating blood sugars 100 each 3    aspirin 81 MG tablet Take 1 tablet by mouth daily      atorvastatin (LIPITOR) 10 mg tablet Take 1 tablet by mouth daily      benzonatate (TESSALON) 200 MG capsule Take 1 capsule (200 mg total) by mouth 3 (three) times a day as needed for cough 20 capsule 0    Blood Glucose Monitoring Suppl (Accu-Chek Joya Plus) w/Device KIT Test blood sugar once daily for fluctuating blood sugars 1 kit 0    doxycycline (ADOXA) 100 MG tablet Take 1 tablet (100 mg total) by mouth 2 (two) times a day for 7 days 14 tablet 0    glucose blood (Accu-Chek Joya Plus) test strip Test blood sugar once daily for fluctuating blood sugars 100 each 3    lisinopril  (ZESTRIL) 10 mg tablet Take 1 tablet by mouth daily      metFORMIN (GLUCOPHAGE-XR) 750 mg 24 hr tablet Take 1 tablet in AM for x 2 weeks, if tolerating increase to 2 tablets daily in AM 60 tablet 1    omeprazole (PriLOSEC OTC) 20 MG tablet Take 1 tablet (20 mg total) by mouth daily 30 tablet 2    tamsulosin (FLOMAX) 0.4 mg Take 1 capsule by mouth Daily       No current facility-administered medications for this visit.     No Known Allergies   Immunizations:     Immunization History   Administered Date(s) Administered    Tdap 07/24/2020      Health Maintenance:         Topic Date Due    Hepatitis C Screening  Never done    HIV Screening  Never done    Colorectal Cancer Screening  04/30/2034         Topic Date Due    Pneumococcal Vaccine: Pediatrics (0 to 5 Years) and At-Risk Patients (6 to 64 Years) (1 of 2 - PCV) Never done    COVID-19 Vaccine (1 - 2023-24 season) Never done      Medicare Screening Tests and Risk Assessments:     Rafael is here for his Subsequent Wellness visit. Last Medicare Wellness visit information reviewed, patient interviewed, no change since last AWV.     Health Risk Assessment:   Patient rates overall health as good. Patient feels that their physical health rating is same. Patient is very satisfied with their life. Eyesight was rated as same. Hearing was rated as slightly worse. Patient feels that their emotional and mental health rating is much better. Patients states they are never, rarely angry. Patient states they are sometimes unusually tired/fatigued. Pain experienced in the last 7 days has been none. Patient states that he has experienced no weight loss or gain in last 6 months.     Fall Risk Screening:   In the past year, patient has experienced: no history of falling in past year      Home Safety:  Patient does not have trouble with stairs inside or outside of their home. Patient has working smoke alarms and has working carbon monoxide detector. Home safety hazards include: none.      Nutrition:   Current diet is Frequent junk food.     Medications:   Patient is not currently taking any over-the-counter supplements. Patient is able to manage medications.     Activities of Daily Living (ADLs)/Instrumental Activities of Daily Living (IADLs):   Walk and transfer into and out of bed and chair?: No  Dress and groom yourself?: No    Bathe or shower yourself?: No    Feed yourself? No  Do your laundry/housekeeping?: No  Manage your money, pay your bills and track your expenses?: No  Make your own meals?: No    Do your own shopping?: No    Previous Hospitalizations:   Any hospitalizations or ED visits within the last 12 months?: No      Advance Care Planning:   Living will: No    Durable POA for healthcare: No    Advanced directive: No    Advanced directive counseling given: Yes    ACP document given: Yes    End of Life Decisions reviewed with patient: Yes      Cognitive Screening:   Provider or family/friend/caregiver concerned regarding cognition?: No    PREVENTIVE SCREENINGS      Cardiovascular Screening:    General: Screening Not Indicated and History Lipid Disorder      Diabetes Screening:     General: Screening Not Indicated and History Diabetes      Colorectal Cancer Screening:     General: Screening Current      Prostate Cancer Screening:    General: Risks and Benefits Discussed    Due for: PSA      Osteoporosis Screening:    General: Risks and Benefits Discussed and Screening Not Indicated      Abdominal Aortic Aneurysm (AAA) Screening:    Risk factors include: tobacco use        General: Risks and Benefits Discussed and Screening Not Indicated      Lung Cancer Screening:     General: Screening Not Indicated and Risks and Benefits Discussed      Hepatitis C Screening:    General: Risks and Benefits Discussed    Hep C Screening Accepted: No     Screening, Brief Intervention, and Referral to Treatment (SBIRT)    Screening  Typical number of drinks in a day: 0  Typical number of drinks in a  "week: 0  Interpretation: Low risk drinking behavior.    Brief Intervention  Alcohol & drug use screenings were reviewed. No concerns regarding substance use disorder identified.     Annual Depression Screening  Time spent screening and evaluating the patient for depression during today's encounter was 5 minutes.    Other Counseling Topics:   Car/seat belt/driving safety, skin self-exam, sunscreen and regular weightbearing exercise.     No results found.     Physical Exam:     /85 (BP Location: Left arm, Patient Position: Sitting, Cuff Size: Large)   Pulse 65   Ht 5' 4\" (1.626 m)   Wt 102 kg (223 lb 14.4 oz)   SpO2 98%   BMI 38.43 kg/m²     Physical Exam  Vitals and nursing note reviewed.   Constitutional:       General: He is not in acute distress.     Appearance: Normal appearance. He is not ill-appearing, toxic-appearing or diaphoretic.   HENT:      Head: Normocephalic and atraumatic.   Eyes:      General:         Right eye: No discharge.         Left eye: No discharge.      Extraocular Movements: Extraocular movements intact.      Conjunctiva/sclera: Conjunctivae normal.   Cardiovascular:      Rate and Rhythm: Normal rate and regular rhythm.      Pulses: Normal pulses.      Heart sounds: Normal heart sounds.   Pulmonary:      Effort: Pulmonary effort is normal.      Breath sounds: Normal breath sounds.   Musculoskeletal:      Cervical back: Normal range of motion and neck supple.   Neurological:      Mental Status: He is alert and oriented to person, place, and time.   Psychiatric:         Mood and Affect: Mood normal.         Behavior: Behavior normal.         Thought Content: Thought content normal.         Judgment: Judgment normal.          Skip Clifford MD  "

## 2024-05-10 NOTE — ASSESSMENT & PLAN NOTE
Lab Results   Component Value Date    HGBA1C 6.5 (H) 02/21/2024   Controlled on recent A1c.  Continue metformin.

## 2024-05-21 ENCOUNTER — OFFICE VISIT (OUTPATIENT)
Dept: FAMILY MEDICINE CLINIC | Facility: CLINIC | Age: 59
End: 2024-05-21
Payer: MEDICARE

## 2024-05-21 VITALS
BODY MASS INDEX: 38.07 KG/M2 | TEMPERATURE: 97.5 F | HEIGHT: 64 IN | HEART RATE: 64 BPM | DIASTOLIC BLOOD PRESSURE: 78 MMHG | SYSTOLIC BLOOD PRESSURE: 138 MMHG | WEIGHT: 223 LBS | OXYGEN SATURATION: 97 %

## 2024-05-21 DIAGNOSIS — E11.9 TYPE 2 DIABETES MELLITUS WITHOUT COMPLICATION, WITHOUT LONG-TERM CURRENT USE OF INSULIN (HCC): Primary | ICD-10-CM

## 2024-05-21 DIAGNOSIS — I10 PRIMARY HYPERTENSION: ICD-10-CM

## 2024-05-21 DIAGNOSIS — H61.23 IMPACTED CERUMEN OF BOTH EARS: ICD-10-CM

## 2024-05-21 DIAGNOSIS — D22.9 ATYPICAL MOLE: ICD-10-CM

## 2024-05-21 LAB — SL AMB POCT HEMOGLOBIN AIC: 6.3 (ref ?–6.5)

## 2024-05-21 PROCEDURE — 99214 OFFICE O/P EST MOD 30 MIN: CPT | Performed by: FAMILY MEDICINE

## 2024-05-21 PROCEDURE — 83036 HEMOGLOBIN GLYCOSYLATED A1C: CPT | Performed by: FAMILY MEDICINE

## 2024-05-21 RX ORDER — METFORMIN HYDROCHLORIDE 750 MG/1
TABLET, EXTENDED RELEASE ORAL
Qty: 180 TABLET | Refills: 1 | Status: SHIPPED | OUTPATIENT
Start: 2024-05-21

## 2024-05-21 NOTE — PROGRESS NOTES
Ambulatory Visit  Name: Rafael Bradford      : 1965      MRN: [id number]  Encounter Provider: Skip Clifford MD  Encounter Date: 2024   Encounter department: [facility]    Assessment & Plan   1. Type 2 diabetes mellitus without complication, without long-term current use of insulin ([facility])  Assessment & Plan:    Lab Results   Component Value Date    HGBA1C 6.3 2024   Controlled on recent A1c.  Continue metformin.    Orders:  -     POCT hemoglobin A1c  -     metFORMIN (GLUCOPHAGE-XR) 750 mg 24 hr tablet; Take 1 tablet in AM for x 2 weeks, if tolerating increase to 2 tablets daily in AM  2. Atypical mole  -     Ambulatory Referral to Dermatology; Future  3. Primary hypertension  Assessment & Plan:  Experience weakness after starting lisinopril.  We have been monitoring his blood pressures at home off lisinopril and they have been decently controlled.  BP today 138/78 and although under goal it is still borderline.  I recommend that he continue monitoring blood pressures at home and let me know if any they increase.  Will follow-up in 3 months.  4. Impacted cerumen of both ears  -     Ambulatory Referral to Otolaryngology; Future       History of Present Illness     Patient presents office today for concerns about his high blood pressure.  At last visit we started lisinopril he says since we started lisinopril he is feeling weak and fatigued.  He stopped taking lisinopril and this resolved and he is feeling much better.  Has been monitoring his blood pressures at home and they have been in the 130s over 70s or 80s.  He also has history diabetes on metformin.  Not having any issues with metformin.  He would like to see an ENT specialist to clean out his ears.  He reports that his ears.  He was following with 1 previously and would like to see them again.  He also has a mole that he would like checked out.  Its at the top of his head has not changed in size but he would like it  "removed.  He has had this mole for many years.  He also had a cyst at the back of his neck that was removed and feels like it is growing again.        Review of Systems   All other systems reviewed and are negative.      Objective     /78 (BP Location: Left arm, Patient Position: Sitting, Cuff Size: Standard)   Pulse 64   Temp 97.5 °F (36.4 °C) (Tympanic)   Ht 5' 4\" (1.626 m)   Wt 101 kg (223 lb)   SpO2 97%   BMI 38.28 kg/m²     Physical Exam  Vitals and nursing note reviewed.   Constitutional:       General: He is not in acute distress.     Appearance: Normal appearance. He is well-developed. He is not ill-appearing, toxic-appearing or diaphoretic.   HENT:      Head: Normocephalic and atraumatic.      Right Ear: There is impacted cerumen.      Left Ear: There is impacted cerumen.   Eyes:      General:         Right eye: No discharge.         Left eye: No discharge.      Extraocular Movements: Extraocular movements intact.      Conjunctiva/sclera: Conjunctivae normal.   Cardiovascular:      Rate and Rhythm: Normal rate.      Pulses: no weak pulses.           Dorsalis pedis pulses are 2+ on the right side and 2+ on the left side.        Posterior tibial pulses are 2+ on the right side and 2+ on the left side.   Pulmonary:      Effort: Pulmonary effort is normal.   Musculoskeletal:      Cervical back: Normal range of motion and neck supple.   Feet:      Right foot:      Skin integrity: No ulcer, skin breakdown, erythema, warmth, callus or dry skin.      Left foot:      Skin integrity: No ulcer, skin breakdown, erythema, warmth, callus or dry skin.   Skin:     General: Skin is dry.      Capillary Refill: Capillary refill takes less than 2 seconds.      Comments: Mole    Neurological:      Mental Status: He is alert and oriented to person, place, and time.   Psychiatric:         Mood and Affect: Mood normal.         Behavior: Behavior normal.         Thought Content: Thought content normal.         Judgment: " Judgment normal.       Administrative Statements     Diabetic Foot Exam    Patient's shoes and socks removed.    Right Foot/Ankle   Right Foot Inspection  Skin Exam: skin normal and skin intact. No dry skin, no warmth, no callus, no erythema, no maceration, no abnormal color, no pre-ulcer, no ulcer and no callus.     Toe Exam: ROM and strength within normal limits.     Sensory   Monofilament testing: intact    Vascular  The right DP pulse is 2+. The right PT pulse is 2+.     Left Foot/Ankle  Left Foot Inspection  Skin Exam: skin normal and skin intact. No dry skin, no warmth, no erythema, no maceration, normal color, no pre-ulcer, no ulcer and no callus.     Toe Exam: ROM and strength within normal limits.     Sensory   Monofilament testing: intact    Vascular  The left DP pulse is 2+. The left PT pulse is 2+.     Assign Risk Category  No deformity present  No loss of protective sensation  No weak pulses  Risk: 0

## 2024-05-21 NOTE — ASSESSMENT & PLAN NOTE
Experience weakness after starting lisinopril.  We have been monitoring his blood pressures at home off lisinopril and they have been decently controlled.  BP today 138/78 and although under goal it is still borderline.  I recommend that he continue monitoring blood pressures at home and let me know if any they increase.  Will follow-up in 3 months.

## 2024-05-21 NOTE — ASSESSMENT & PLAN NOTE
Lab Results   Component Value Date    HGBA1C 6.3 05/21/2024   Controlled on recent A1c.  Continue metformin.     [facility]

## 2024-06-07 DIAGNOSIS — K21.9 GERD WITHOUT ESOPHAGITIS: ICD-10-CM

## 2024-06-07 DIAGNOSIS — E11.9 TYPE 2 DIABETES MELLITUS WITHOUT COMPLICATION, WITHOUT LONG-TERM CURRENT USE OF INSULIN (HCC): ICD-10-CM

## 2024-06-07 DIAGNOSIS — N40.1 BENIGN PROSTATIC HYPERPLASIA WITH LOWER URINARY TRACT SYMPTOMS, SYMPTOM DETAILS UNSPECIFIED: Primary | ICD-10-CM

## 2024-06-07 RX ORDER — METFORMIN HYDROCHLORIDE 750 MG/1
TABLET, EXTENDED RELEASE ORAL
Qty: 180 TABLET | Refills: 1 | Status: SHIPPED | OUTPATIENT
Start: 2024-06-07

## 2024-06-07 RX ORDER — OMEPRAZOLE 20 MG/1
20 TABLET, DELAYED RELEASE ORAL DAILY
Qty: 30 TABLET | Refills: 2 | Status: SHIPPED | OUTPATIENT
Start: 2024-06-07

## 2024-06-07 RX ORDER — TAMSULOSIN HYDROCHLORIDE 0.4 MG/1
0.4 CAPSULE ORAL
Qty: 90 CAPSULE | Refills: 0 | Status: SHIPPED | OUTPATIENT
Start: 2024-06-07

## 2024-06-07 NOTE — TELEPHONE ENCOUNTER
Patient contacted medication refill line requesting refills be sent to pharmacy on file (confirmed) of:    FLOMAX 0.4 mg  PriLOSEC OTC 20 mg tablet  metFORMIN 500 mg 24 hr tablet      metFORMIN issue - patient states that he has been taken 500 mg. Last refill send was for 750 mg .... Patient did NOT  script due to he believes it is the incorrect dosage.    Script qued: need to change to 500 mg if that is correct.

## 2024-06-24 ENCOUNTER — CLINICAL SUPPORT (OUTPATIENT)
Dept: FAMILY MEDICINE CLINIC | Facility: CLINIC | Age: 59
End: 2024-06-24
Payer: MEDICARE

## 2024-06-24 DIAGNOSIS — E11.9 TYPE 2 DIABETES MELLITUS WITHOUT COMPLICATION, WITHOUT LONG-TERM CURRENT USE OF INSULIN (HCC): ICD-10-CM

## 2024-06-24 PROCEDURE — 92250 FUNDUS PHOTOGRAPHY W/I&R: CPT

## 2024-07-01 ENCOUNTER — TELEPHONE (OUTPATIENT)
Dept: FAMILY MEDICINE CLINIC | Facility: CLINIC | Age: 59
End: 2024-07-01

## 2024-07-10 ENCOUNTER — LAB (OUTPATIENT)
Dept: LAB | Facility: MEDICAL CENTER | Age: 59
End: 2024-07-10
Payer: MEDICARE

## 2024-07-10 DIAGNOSIS — E11.9 TYPE 2 DIABETES MELLITUS WITHOUT COMPLICATION, WITHOUT LONG-TERM CURRENT USE OF INSULIN (HCC): ICD-10-CM

## 2024-07-10 DIAGNOSIS — Z12.5 SCREENING FOR MALIGNANT NEOPLASM OF PROSTATE: ICD-10-CM

## 2024-07-10 LAB
EST. AVERAGE GLUCOSE BLD GHB EST-MCNC: 143 MG/DL
HBA1C MFR BLD: 6.6 %
PSA SERPL-MCNC: 0.58 NG/ML (ref 0–4)

## 2024-07-10 PROCEDURE — 83036 HEMOGLOBIN GLYCOSYLATED A1C: CPT

## 2024-07-10 PROCEDURE — 36415 COLL VENOUS BLD VENIPUNCTURE: CPT

## 2024-07-10 PROCEDURE — G0103 PSA SCREENING: HCPCS

## 2024-07-11 ENCOUNTER — OFFICE VISIT (OUTPATIENT)
Dept: FAMILY MEDICINE CLINIC | Facility: CLINIC | Age: 59
End: 2024-07-11
Payer: MEDICARE

## 2024-07-11 ENCOUNTER — TELEPHONE (OUTPATIENT)
Age: 59
End: 2024-07-11

## 2024-07-11 VITALS
SYSTOLIC BLOOD PRESSURE: 110 MMHG | OXYGEN SATURATION: 98 % | HEIGHT: 64 IN | WEIGHT: 223 LBS | TEMPERATURE: 95.6 F | HEART RATE: 63 BPM | DIASTOLIC BLOOD PRESSURE: 80 MMHG | BODY MASS INDEX: 38.07 KG/M2

## 2024-07-11 DIAGNOSIS — M25.561 ACUTE PAIN OF RIGHT KNEE: Primary | ICD-10-CM

## 2024-07-11 DIAGNOSIS — E11.9 TYPE 2 DIABETES MELLITUS WITHOUT COMPLICATION, WITHOUT LONG-TERM CURRENT USE OF INSULIN (HCC): ICD-10-CM

## 2024-07-11 DIAGNOSIS — I10 PRIMARY HYPERTENSION: ICD-10-CM

## 2024-07-11 PROCEDURE — G2211 COMPLEX E/M VISIT ADD ON: HCPCS | Performed by: FAMILY MEDICINE

## 2024-07-11 PROCEDURE — 99214 OFFICE O/P EST MOD 30 MIN: CPT | Performed by: FAMILY MEDICINE

## 2024-07-11 RX ORDER — MELOXICAM 7.5 MG/1
7.5 TABLET ORAL DAILY PRN
Qty: 30 TABLET | Refills: 0 | Status: SHIPPED | OUTPATIENT
Start: 2024-07-11

## 2024-07-11 RX ORDER — METFORMIN HYDROCHLORIDE 750 MG/1
TABLET, EXTENDED RELEASE ORAL
Qty: 180 TABLET | Refills: 1 | Status: SHIPPED | OUTPATIENT
Start: 2024-07-11

## 2024-07-11 NOTE — PROGRESS NOTES
"Ambulatory Visit  Name: Rafael Bradford      : 1965      MRN: [id number]  Encounter Provider: Skip Clifford MD  Encounter Date: 2024   Encounter department: [facility]    Assessment & Plan   1. Acute pain of right knee  -     meloxicam (MOBIC) 7.5 mg tablet; Take 1 tablet (7.5 mg total) by mouth daily as needed for moderate pain  2. Type 2 diabetes mellitus without complication, without long-term current use of insulin (HCC)  -     metFORMIN (GLUCOPHAGE-XR) 750 mg 24 hr tablet; Take 1 tablet in AM for x 2 weeks, if tolerating increase to 2 tablets daily in AM  3. Primary hypertension    Mild right suprapatellar swelling.  The rest of exam unremarkable.  Patient likely has patella tendon strain.  Continue supportive care.  Diabetes controlled.  Continue metformin.  BP controlled off lisinopril.  Continue dietary and lifestyle modification.       History of Present Illness     Patient presents office today for right knee pain.  Started a little over a week ago.  He bent down and when he stood up he heard a pop and started having knee swelling shortly after.  He says it really does not bother him too much no significant pain but it does feel tight.  Swelling has not gotten worse but has not gone down either.  No trouble with ambulation.  Bearing weight fine.  Denies any falls.  No clicking or snapping noises.  Blood pressures have been good at home off lisinopril.  He needs refill of metformin today.  Tolerating metformin well no issues.        Review of Systems   All other systems reviewed and are negative.      Objective     /80 (BP Location: Left arm, Patient Position: Sitting, Cuff Size: Standard)   Pulse 63   Temp (!) 95.6 °F (35.3 °C) (Tympanic)   Ht 5' 4\" (1.626 m)   Wt 101 kg (223 lb)   SpO2 98%   BMI 38.28 kg/m²     Physical Exam  Vitals and nursing note reviewed.   Constitutional:       General: He is not in acute distress.     Appearance: Normal appearance. " He is well-developed. He is not ill-appearing, toxic-appearing or diaphoretic.   HENT:      Head: Normocephalic and atraumatic.   Eyes:      General:         Right eye: No discharge.         Left eye: No discharge.      Extraocular Movements: Extraocular movements intact.      Conjunctiva/sclera: Conjunctivae normal.   Cardiovascular:      Rate and Rhythm: Normal rate.      Pulses:           Dorsalis pedis pulses are 2+ on the right side and 2+ on the left side.        Posterior tibial pulses are 2+ on the right side and 2+ on the left side.   Pulmonary:      Effort: Pulmonary effort is normal.   Musculoskeletal:         General: Swelling (Right suprapatellar region) present. No tenderness, deformity or signs of injury.      Cervical back: Normal range of motion and neck supple.      Right lower leg: No edema.      Left lower leg: No edema.   Feet:      Right foot:      Skin integrity: No ulcer, skin breakdown, erythema, warmth, callus or dry skin.      Left foot:      Skin integrity: No ulcer, skin breakdown, erythema, warmth, callus or dry skin.   Skin:     General: Skin is dry.      Capillary Refill: Capillary refill takes less than 2 seconds.   Neurological:      Mental Status: He is alert and oriented to person, place, and time.   Psychiatric:         Mood and Affect: Mood normal.         Behavior: Behavior normal.         Thought Content: Thought content normal.         Judgment: Judgment normal.       Administrative Statements

## 2024-07-11 NOTE — TELEPHONE ENCOUNTER
I was on a 3 way call with patient and Brittanie a [facility] representative. Patient got a call inquiring if he has received bills from our office. He could not tell me who the call was from. Both the patient and the representative could not tell me if medicare is primary and well care is secondary, or if well care is the patients medicare replacement plan. Pt is supposed to have an apt today with Dr. Clifford at 10. I was able to give the doctors npi to [facility] while I had them on the phone and they confirmed that he is not in network. I also checked our par list and the list says out of network. But if he can/is willing to come here stems on if wellcare is just a supplement or if its the entire med rep plan. If he has medicare a and b primary, they will cover 80% of his bills and be responsible for the remaining 20%, if wellcare is a rep plan, he will be self pay 100%. Spoke with Selina ARRINGTON and Maite chappell about this. Yana GAMA was just getting into the office as we spoke and the info was going to be relayed to her.

## 2024-08-03 ENCOUNTER — OFFICE VISIT (OUTPATIENT)
Dept: URGENT CARE | Facility: CLINIC | Age: 59
End: 2024-08-03
Payer: MEDICARE

## 2024-08-03 VITALS
SYSTOLIC BLOOD PRESSURE: 145 MMHG | OXYGEN SATURATION: 98 % | HEIGHT: 64 IN | BODY MASS INDEX: 37.56 KG/M2 | TEMPERATURE: 98.4 F | HEART RATE: 81 BPM | DIASTOLIC BLOOD PRESSURE: 85 MMHG | WEIGHT: 220 LBS | RESPIRATION RATE: 18 BRPM

## 2024-08-03 DIAGNOSIS — J06.9 ACUTE URI: Primary | ICD-10-CM

## 2024-08-03 PROCEDURE — 99213 OFFICE O/P EST LOW 20 MIN: CPT | Performed by: FAMILY MEDICINE

## 2024-08-03 RX ORDER — FLUTICASONE PROPIONATE 50 MCG
1 SPRAY, SUSPENSION (ML) NASAL DAILY
Qty: 18.2 ML | Refills: 0 | Status: SHIPPED | OUTPATIENT
Start: 2024-08-03

## 2024-08-03 RX ORDER — BENZONATATE 200 MG/1
200 CAPSULE ORAL 3 TIMES DAILY PRN
Qty: 20 CAPSULE | Refills: 0 | Status: SHIPPED | OUTPATIENT
Start: 2024-08-03

## 2024-08-03 NOTE — PATIENT INSTRUCTIONS
Discussed symptoms are most likely viral in nature.  Offered COVID testing, patient stating he has at home test which she will perform.  To take Tessalon for cough.  Use Flonase as instructed.

## 2024-08-03 NOTE — PROGRESS NOTES
[facility]        NAME: Rafael Bradford is a 58 y.o. male  : 1965    MRN: [id number]  DATE: August 3, 2024  TIME: 12:50 PM    Assessment and Plan   Acute URI [J06.9]  1. Acute URI  fluticasone (FLONASE) 50 mcg/act nasal spray    benzonatate (TESSALON) 200 MG capsule            Patient Instructions     Patient Instructions   Discussed symptoms are most likely viral in nature.  Offered COVID testing, patient stating he has at home test which she will perform.  To take Tessalon for cough.  Use Flonase as instructed.      Follow up with PCP in 3-5 days.  Proceed to  ER if symptoms worsen.    Chief Complaint     Chief Complaint   Patient presents with    Sore Throat     Patient c/o cough, sore throat, weakness, dizziness, fever, and no taste that started 1.5 weeks ago.  Patient taking tylenol and otc cold medicine, no relief.             History of Present Illness       Patient is a 58-year-old male presenting today with cold-like symptoms x 1 week.  Patient notes over the last several days he has been experiencing some congestion, postnasal drip, cough and sore throat.  Notes he has not been able to smell or taste much over the last couple days, states his symptoms are similar to last time he had COVID, was wanting COVID testing.  States he does have an at home test but was unsure of its accuracy.  Denies fever, chills, chest tightness, SOB.        Review of Systems   Review of Systems   Constitutional:  Positive for fatigue. Negative for chills and fever.   HENT:  Positive for congestion and sore throat. Negative for ear pain.    Eyes:  Negative for pain and visual disturbance.   Respiratory:  Positive for cough. Negative for shortness of breath.    Cardiovascular:  Negative for chest pain and palpitations.   Gastrointestinal:  Negative for abdominal pain and vomiting.   Genitourinary:  Negative for dysuria and hematuria.   Musculoskeletal:  Negative for arthralgias and back pain.   Skin:  Negative for  color change and rash.   Neurological:  Negative for seizures and syncope.   All other systems reviewed and are negative.        Current Medications       Current Outpatient Medications:     aspirin 81 MG tablet, Take 1 tablet by mouth daily, Disp: , Rfl:     benzonatate (TESSALON) 200 MG capsule, Take 1 capsule (200 mg total) by mouth 3 (three) times a day as needed for cough, Disp: 20 capsule, Rfl: 0    Blood Glucose Monitoring Suppl (Accu-Chek Joya Plus) w/Device KIT, Test blood sugar once daily for fluctuating blood sugars, Disp: 1 kit, Rfl: 0    fluticasone (FLONASE) 50 mcg/act nasal spray, 1 spray into each nostril daily, Disp: 18.2 mL, Rfl: 0    glucose blood (Accu-Chek Joya Plus) test strip, Test blood sugar once daily for fluctuating blood sugars, Disp: 100 each, Rfl: 3    metFORMIN (GLUCOPHAGE-XR) 750 mg 24 hr tablet, Take 1 tablet in AM for x 2 weeks, if tolerating increase to 2 tablets daily in AM, Disp: 180 tablet, Rfl: 1    omeprazole (PriLOSEC OTC) 20 MG tablet, Take 1 tablet (20 mg total) by mouth daily, Disp: 30 tablet, Rfl: 2    tamsulosin (FLOMAX) 0.4 mg, Take 1 capsule (0.4 mg total) by mouth daily with dinner, Disp: 90 capsule, Rfl: 0    Accu-Chek FastClix Lancets MISC, Test blood sugar once daily for fluctuating blood sugars (Patient not taking: Reported on 8/3/2024), Disp: 100 each, Rfl: 3    atorvastatin (LIPITOR) 10 mg tablet, Take 1 tablet by mouth daily (Patient not taking: Reported on 5/21/2024), Disp: , Rfl:     benzonatate (TESSALON) 200 MG capsule, Take 1 capsule (200 mg total) by mouth 3 (three) times a day as needed for cough (Patient not taking: Reported on 5/21/2024), Disp: 20 capsule, Rfl: 0    lisinopril (ZESTRIL) 10 mg tablet, Take 1 tablet by mouth daily (Patient not taking: Reported on 7/11/2024), Disp: , Rfl:     meloxicam (MOBIC) 7.5 mg tablet, Take 1 tablet (7.5 mg total) by mouth daily as needed for moderate pain (Patient not taking: Reported on 8/3/2024), Disp: 30  "tablet, Rfl: 0    Current Allergies     Allergies as of 08/03/2024    (No Known Allergies)            The following portions of the patient's history were reviewed and updated as appropriate: allergies, current medications, past family history, past medical history, past social history, past surgical history and problem list.     Past Medical History:   Diagnosis Date    Acid reflux     Diabetes mellitus (HCC)     GERD (gastroesophageal reflux disease)     Hypertension     TRES (obstructive sleep apnea)     Panic disorder     SVT (supraventricular tachycardia)        Past Surgical History:   Procedure Laterality Date    AV NODE ABLATION      CARDIAC SURGERY      CYST REMOVAL      NE LAPAROSCOPY SURG CHOLECYSTECTOMY N/A 9/24/2019    Procedure: CHOLECYSTECTOMY LAPAROSCOPIC;  Surgeon: Rodrigo Genao DO;  Location: [facility];  Service: General       Family History   Problem Relation Age of Onset    Diabetes Mother     Kidney disease Mother     Liver cancer Mother     Heart disease Mother     Hypertension Mother     Thyroid disease Mother     Diabetes Sister     Hypertension Sister     Thyroid disease Father     Stroke Maternal Uncle          Medications have been verified.        Objective   /85   Pulse 81   Temp 98.4 °F (36.9 °C) (Temporal)   Resp 18   Ht 5' 4\" (1.626 m)   Wt 99.8 kg (220 lb)   SpO2 98%   BMI 37.76 kg/m²        Physical Exam     Physical Exam  Vitals and nursing note reviewed.   Constitutional:       General: He is not in acute distress.     Appearance: Normal appearance. He is well-developed. He is not toxic-appearing.   HENT:      Head: Normocephalic and atraumatic.      Right Ear: Tympanic membrane, ear canal and external ear normal.      Left Ear: Tympanic membrane, ear canal and external ear normal.      Nose: Congestion present.      Mouth/Throat:      Mouth: Mucous membranes are moist.      Pharynx: Oropharynx is clear.   Eyes:      Conjunctiva/sclera: Conjunctivae normal. "   Cardiovascular:      Rate and Rhythm: Normal rate and regular rhythm.      Pulses: Normal pulses.      Heart sounds: Normal heart sounds.   Pulmonary:      Effort: Pulmonary effort is normal.      Breath sounds: Normal breath sounds.   Musculoskeletal:      Cervical back: Normal range of motion. No tenderness.   Lymphadenopathy:      Cervical: No cervical adenopathy.   Skin:     General: Skin is warm.      Capillary Refill: Capillary refill takes less than 2 seconds.   Neurological:      General: No focal deficit present.      Mental Status: He is alert and oriented to person, place, and time.

## 2024-08-08 ENCOUNTER — TELEPHONE (OUTPATIENT)
Age: 59
End: 2024-08-08

## 2024-08-08 DIAGNOSIS — E11.9 TYPE 2 DIABETES MELLITUS WITHOUT COMPLICATION, WITHOUT LONG-TERM CURRENT USE OF INSULIN (HCC): ICD-10-CM

## 2024-08-08 RX ORDER — METFORMIN HYDROCHLORIDE 500 MG/1
500 TABLET, EXTENDED RELEASE ORAL 2 TIMES DAILY WITH MEALS
Qty: 200 TABLET | Refills: 1 | Status: SHIPPED | OUTPATIENT
Start: 2024-08-08

## 2024-08-08 NOTE — TELEPHONE ENCOUNTER
Patient called the RX Refill Line.  Message is being forwarded to the office.     Patient is requesting a script for metformin 500. States that he takes it twice a day.  He stated he does not take the 750 mg tablet.       Please contact patient at   [phone]

## 2024-11-25 ENCOUNTER — APPOINTMENT (OUTPATIENT)
Dept: LAB | Facility: CLINIC | Age: 59
End: 2024-11-25
Payer: COMMERCIAL

## 2024-11-25 DIAGNOSIS — E78.2 MIXED HYPERLIPIDEMIA: ICD-10-CM

## 2024-11-25 DIAGNOSIS — E11.9 TYPE 2 DIABETES MELLITUS WITHOUT COMPLICATION, WITHOUT LONG-TERM CURRENT USE OF INSULIN (HCC): ICD-10-CM

## 2024-11-25 LAB
ALBUMIN SERPL BCG-MCNC: 4.2 G/DL (ref 3.5–5)
ALP SERPL-CCNC: 63 U/L (ref 34–104)
ALT SERPL W P-5'-P-CCNC: 36 U/L (ref 7–52)
ANION GAP SERPL CALCULATED.3IONS-SCNC: 7 MMOL/L (ref 4–13)
AST SERPL W P-5'-P-CCNC: 20 U/L (ref 13–39)
BILIRUB SERPL-MCNC: 0.8 MG/DL (ref 0.2–1)
BUN SERPL-MCNC: 13 MG/DL (ref 5–25)
CALCIUM SERPL-MCNC: 9.4 MG/DL (ref 8.4–10.2)
CHLORIDE SERPL-SCNC: 105 MMOL/L (ref 96–108)
CO2 SERPL-SCNC: 29 MMOL/L (ref 21–32)
CREAT SERPL-MCNC: 1 MG/DL (ref 0.6–1.3)
EST. AVERAGE GLUCOSE BLD GHB EST-MCNC: 140 MG/DL
GFR SERPL CREATININE-BSD FRML MDRD: 82 ML/MIN/1.73SQ M
GLUCOSE P FAST SERPL-MCNC: 140 MG/DL (ref 65–99)
HBA1C MFR BLD: 6.5 %
LDLC SERPL DIRECT ASSAY-MCNC: 120 MG/DL (ref 0–100)
POTASSIUM SERPL-SCNC: 3.9 MMOL/L (ref 3.5–5.3)
PROT SERPL-MCNC: 7.3 G/DL (ref 6.4–8.4)
SODIUM SERPL-SCNC: 141 MMOL/L (ref 135–147)

## 2024-11-25 PROCEDURE — 83721 ASSAY OF BLOOD LIPOPROTEIN: CPT

## 2024-11-25 PROCEDURE — 80053 COMPREHEN METABOLIC PANEL: CPT

## 2024-11-25 PROCEDURE — 36415 COLL VENOUS BLD VENIPUNCTURE: CPT

## 2024-11-25 PROCEDURE — 83036 HEMOGLOBIN GLYCOSYLATED A1C: CPT

## 2024-12-06 ENCOUNTER — TELEPHONE (OUTPATIENT)
Dept: FAMILY MEDICINE CLINIC | Facility: CLINIC | Age: 59
End: 2024-12-06

## 2024-12-06 NOTE — TELEPHONE ENCOUNTER
Patient came into the office to re-establish with the practice with new insurance. Patient is scheduled for AWV in May for 40 min long visit.     Insurance is updated         [facility] [place]

## 2025-02-10 ENCOUNTER — TELEPHONE (OUTPATIENT)
Age: 60
End: 2025-02-10

## 2025-02-10 NOTE — TELEPHONE ENCOUNTER
Pt called in scheduled an appointment 3/4 pt is requesting a lab order for upcoming visit   Please Advise Thank You

## 2025-02-11 DIAGNOSIS — E11.9 TYPE 2 DIABETES MELLITUS WITHOUT COMPLICATION, WITHOUT LONG-TERM CURRENT USE OF INSULIN (HCC): Primary | ICD-10-CM

## 2025-02-11 DIAGNOSIS — Z12.5 SCREENING FOR MALIGNANT NEOPLASM OF PROSTATE: ICD-10-CM

## 2025-03-14 ENCOUNTER — OFFICE VISIT (OUTPATIENT)
Dept: PODIATRY | Facility: CLINIC | Age: 60
End: 2025-03-14

## 2025-03-14 VITALS
BODY MASS INDEX: 37.22 KG/M2 | HEIGHT: 64 IN | TEMPERATURE: 98.4 F | WEIGHT: 218 LBS | OXYGEN SATURATION: 98 % | HEART RATE: 77 BPM | RESPIRATION RATE: 16 BRPM

## 2025-03-14 DIAGNOSIS — L03.031 CELLULITIS OF GREAT TOE OF RIGHT FOOT: ICD-10-CM

## 2025-03-14 DIAGNOSIS — L60.0 INGROWN RIGHT BIG TOENAIL: Primary | ICD-10-CM

## 2025-03-14 RX ORDER — MUPIROCIN 20 MG/G
OINTMENT TOPICAL DAILY
Qty: 22 G | Refills: 1 | Status: SHIPPED | OUTPATIENT
Start: 2025-03-14

## 2025-03-14 RX ORDER — CEPHALEXIN 500 MG/1
500 CAPSULE ORAL EVERY 8 HOURS SCHEDULED
Qty: 21 CAPSULE | Refills: 0 | Status: SHIPPED | OUTPATIENT
Start: 2025-03-14 | End: 2025-03-21

## 2025-03-14 NOTE — PROGRESS NOTES
Name: Rafael Bradford      : 1965      MRN: [id number]  Encounter Provider: Cyndy Lawson DPM  Encounter Date: 3/14/2025   [facility]  :  Assessment & Plan  Ingrown right big toenail    Orders:    mupirocin (BACTROBAN) 2 % ointment; Apply topically daily    cephalexin (KEFLEX) 500 mg capsule; Take 1 capsule (500 mg total) by mouth every 8 (eight) hours for 7 days    Cellulitis of great toe of right foot                 IMPRESSION:  RLE GT lateral ingrown with acute signs of infection    PLAN:  Right great toe lateral nail border ingrown.  Purulence at distal tip  Slant back cut performed, area cleansed and purulence evacuated.  No deep tunneling or tracking noted.  Wound in area with granular base  Patient counseled on increased risk of infection due to diabetes and other comorbidities  Patient has history of permanent PNA in the same area performed by his podiatrist Dr. Trivedi.  Patient was unable to see Dr. Trivedi and scheduled an appointment with [facility]  Patient counseled on ingrown toenail procedures.  Reviewed slant back, partial nail avulsion, and partial matrixectomy.  Discussed possible need for repeat permanent PNA due to reoccurrence of ingrown  Rx mupirocin 2% ointment for application daily   Rx Keflex 100 mg 1 week  Patient would like to return for permanent partial nail avulsion once infection has resolved.    Patient counseled on clinical signs of infection will contact the office if symptoms worsen or present to the ED if systemic signs of infection occur  Follow-up 2 weeks for reevaluation and permanent PNA    History of Present Illness   HPI  Rafael Bradford is a 59 y.o. male who presents for evaluation management of right great toe lateral nail border due to ingrown toenail.  Patient is regular podiatrist is Dr. Trivedi however he was not able to get an appointment.  Patient states the area was treated about a year ago with a permanent nail  "procedure however the nail has regrown.  He has noticed increased pain, redness, and swelling over the last several weeks.  Patient is noted a small amount of drainage at the distal lateral aspect of the toe.  He denies any systemic signs of infection such as nausea, vomiting, fever, chills, shortness of breath.  He presents today for further evaluation and management.         Review of Systems   Constitutional: Negative.    Eyes: Negative.    Respiratory:  Negative for chest tightness and shortness of breath.    Skin:  Positive for wound.          Objective   Pulse 77   Temp 98.4 °F (36.9 °C)   Resp 16   Ht 5' 4\" (1.626 m)   Wt 98.9 kg (218 lb)   SpO2 98%   BMI 37.42 kg/m²      Physical Exam  Constitutional:       Appearance: He is not ill-appearing.   Cardiovascular:      Comments: DP/PT pulse 1/4  CRT less than 3 seconds to distal digits  Decreased pedal hair growth  Skin temperature gradient even from knees to digits  Musculoskeletal:      Comments: Tender with palpation right great toe lateral nail border   Skin:     Capillary Refill: Capillary refill takes less than 2 seconds.      Comments: Right great toe with incurvated toenail.  Mild erythema and edema noted along lateral nail border.  The distal lateral toe with small wound at level of nail incurvation.  Small purulent pustule located in the area.  No underlying tracking or tunneling.  No crepitus, no fluctuance, no malodor   Neurological:      Comments: Mild decrease in sensation to distal digits           "

## 2025-03-19 ENCOUNTER — HOSPITAL ENCOUNTER (EMERGENCY)
Facility: HOSPITAL | Age: 60
Discharge: HOME | End: 2025-03-19
Attending: EMERGENCY MEDICINE | Admitting: EMERGENCY MEDICINE
Payer: COMMERCIAL

## 2025-03-19 VITALS
SYSTOLIC BLOOD PRESSURE: 160 MMHG | OXYGEN SATURATION: 97 % | DIASTOLIC BLOOD PRESSURE: 82 MMHG | HEART RATE: 50 BPM | TEMPERATURE: 97.9 F | RESPIRATION RATE: 12 BRPM

## 2025-03-19 DIAGNOSIS — F41.9 ANXIETY: ICD-10-CM

## 2025-03-19 DIAGNOSIS — Z13.9 ENCOUNTER FOR MEDICAL SCREENING EXAMINATION: Primary | ICD-10-CM

## 2025-03-19 LAB
ALBUMIN SERPL BCG-MCNC: 4.4 G/DL (ref 3.5–5)
ALP SERPL-CCNC: 72 U/L (ref 34–104)
ALT SERPL W P-5'-P-CCNC: 27 U/L (ref 7–52)
ANION GAP SERPL CALCULATED.3IONS-SCNC: 10 MMOL/L (ref 4–13)
AST SERPL W P-5'-P-CCNC: 15 U/L (ref 13–39)
BASOPHILS # BLD AUTO: 0.1 THOUSANDS/ÂΜL (ref 0–0.1)
BASOPHILS NFR BLD AUTO: 1 % (ref 0–1)
BILIRUB SERPL-MCNC: 0.44 MG/DL (ref 0.2–1)
BUN SERPL-MCNC: 12 MG/DL (ref 5–25)
CALCIUM SERPL-MCNC: 9.5 MG/DL (ref 8.4–10.2)
CARDIAC TROPONIN I PNL SERPL HS: <2 NG/L (ref ?–50)
CARDIAC TROPONIN I PNL SERPL HS: <2 NG/L (ref ?–50)
CHLORIDE SERPL-SCNC: 103 MMOL/L (ref 96–108)
CO2 SERPL-SCNC: 27 MMOL/L (ref 21–32)
CREAT SERPL-MCNC: 1.15 MG/DL (ref 0.6–1.3)
EOSINOPHIL # BLD AUTO: 0.47 THOUSAND/ÂΜL (ref 0–0.61)
EOSINOPHIL NFR BLD AUTO: 6 % (ref 0–6)
ERYTHROCYTE [DISTWIDTH] IN BLOOD BY AUTOMATED COUNT: 12.4 % (ref 11.6–15.1)
GFR SERPL CREATININE-BSD FRML MDRD: 69 ML/MIN/1.73SQ M
GLUCOSE SERPL-MCNC: 111 MG/DL (ref 65–140)
HCT VFR BLD AUTO: 44.3 % (ref 36.5–49.3)
HGB BLD-MCNC: 15.1 G/DL (ref 12–17)
IMM GRANULOCYTES # BLD AUTO: 0.02 THOUSAND/UL (ref 0–0.2)
IMM GRANULOCYTES NFR BLD AUTO: 0 % (ref 0–2)
LYMPHOCYTES # BLD AUTO: 2.69 THOUSANDS/ÂΜL (ref 0.6–4.47)
LYMPHOCYTES NFR BLD AUTO: 32 % (ref 14–44)
MAGNESIUM SERPL-MCNC: 2.1 MG/DL (ref 1.9–2.7)
MCH RBC QN AUTO: 29.8 PG (ref 26.8–34.3)
MCHC RBC AUTO-ENTMCNC: 34.1 G/DL (ref 31.4–37.4)
MCV RBC AUTO: 87 FL (ref 82–98)
MONOCYTES # BLD AUTO: 1 THOUSAND/ÂΜL (ref 0.17–1.22)
MONOCYTES NFR BLD AUTO: 12 % (ref 4–12)
NEUTROPHILS # BLD AUTO: 4.06 THOUSANDS/ÂΜL (ref 1.85–7.62)
NEUTS SEG NFR BLD AUTO: 49 % (ref 43–75)
NRBC BLD AUTO-RTO: 0 /100 WBCS
PLATELET # BLD AUTO: 207 THOUSANDS/UL (ref 149–390)
PMV BLD AUTO: 10.4 FL (ref 8.9–12.7)
POTASSIUM SERPL-SCNC: 3.4 MMOL/L (ref 3.5–5.3)
PROT SERPL-MCNC: 7.6 G/DL (ref 6.4–8.4)
RBC # BLD AUTO: 5.07 MILLION/UL (ref 3.88–5.62)
SODIUM SERPL-SCNC: 140 MMOL/L (ref 135–147)
TSH SERPL DL<=0.05 MIU/L-ACNC: 2.8 UIU/ML (ref 0.45–4.5)
WBC # BLD AUTO: 8.34 THOUSAND/UL (ref 4.31–10.16)

## 2025-03-19 PROCEDURE — 84443 ASSAY THYROID STIM HORMONE: CPT | Performed by: EMERGENCY MEDICINE

## 2025-03-19 PROCEDURE — 99284 EMERGENCY DEPT VISIT MOD MDM: CPT | Performed by: EMERGENCY MEDICINE

## 2025-03-19 PROCEDURE — 83735 ASSAY OF MAGNESIUM: CPT | Performed by: EMERGENCY MEDICINE

## 2025-03-19 PROCEDURE — 80053 COMPREHEN METABOLIC PANEL: CPT | Performed by: EMERGENCY MEDICINE

## 2025-03-19 PROCEDURE — 36415 COLL VENOUS BLD VENIPUNCTURE: CPT | Performed by: EMERGENCY MEDICINE

## 2025-03-19 PROCEDURE — 99284 EMERGENCY DEPT VISIT MOD MDM: CPT

## 2025-03-19 PROCEDURE — 93005 ELECTROCARDIOGRAM TRACING: CPT

## 2025-03-19 PROCEDURE — 84484 ASSAY OF TROPONIN QUANT: CPT | Performed by: EMERGENCY MEDICINE

## 2025-03-19 PROCEDURE — 85025 COMPLETE CBC W/AUTO DIFF WBC: CPT | Performed by: EMERGENCY MEDICINE

## 2025-03-20 LAB
ATRIAL RATE: 77 BPM
P AXIS: 56 DEGREES
PR INTERVAL: 178 MS
QRS AXIS: 48 DEGREES
QRSD INTERVAL: 78 MS
QT INTERVAL: 390 MS
QTC INTERVAL: 441 MS
T WAVE AXIS: 37 DEGREES
VENTRICULAR RATE: 77 BPM

## 2025-03-20 PROCEDURE — 93010 ELECTROCARDIOGRAM REPORT: CPT | Performed by: INTERNAL MEDICINE

## 2025-03-20 NOTE — DISCHARGE INSTRUCTIONS
Thank you for visiting the Emergency Department today.    No acute findings on workup.  Normal cardiac rhythm and normal EKG.  No evidence of return of your prior arrhythmia.  Blood pressure elevated initially and resolved without any medications given in the ER.  Basic labs heart enzymes all normal.  Suspect anxiety/stress may be contributing to your symptoms.    At this time no evidence of an emergent/acute process requiring hospitalization.  If your symptoms worsen fail to improve or develop new features you should return to the ER for reevaluation and seek follow-up with your PCP.  Continue your chronic medications.  No new changes.

## 2025-03-20 NOTE — ED PROVIDER NOTES
Time reflects when diagnosis was documented in both MDM as applicable and the Disposition within this note       Time User Action Codes Description Comment    3/19/2025  9:23 PM Sky Hamlin [Z13.9] Encounter for medical screening examination     3/19/2025  9:23 PM Sky Hamlin [F41.9] Anxiety           ED Disposition       None          Assessment & Plan       Medical Decision Making  59-year-old male presenting with nonspecific symptoms differential diagnosis includes anxiety, primary arrhythmia, electrolyte derangements, viral syndrome.  No neurologic deficits on exam to raise suspicion for intracranial process.  Could be symptomatic hypertension as well.  Will check screening labs.  No indication for head CT imaging based on normal neuroexam.  Initial blood pressure elevated but repeat without intervention downtrending.  Overall suspect anxiety mediated symptoms.  Patient continues to feel improved in the ED will check 2-hour troponin given short onset since initial.  Anticipate outpatient management with no acute findings on workup.  Return to ER precautions discussed.    Amount and/or Complexity of Data Reviewed  Labs: ordered.        ED Course as of 03/19/25 2124   Wed Mar 19, 2025   2122 Blood Pressure: 153/80       Medications - No data to display    ED Risk Strat Scores                            SBIRT 20yo+      Flowsheet Row Most Recent Value   Initial Alcohol Screen: US AUDIT-C     1. How often do you have a drink containing alcohol? 0 Filed at: 03/19/2025 2004   2. How many drinks containing alcohol do you have on a typical day you are drinking?  0 Filed at: 03/19/2025 2004   3a. Male UNDER 65: How often do you have five or more drinks on one occasion? 0 Filed at: 03/19/2025 2004   3b. FEMALE Any Age, or MALE 65+: How often do you have 4 or more drinks on one occassion? 0 Filed at: 03/19/2025 2004   Audit-C Score 0 Filed at: 03/19/2025 2004   MARCIANO: How many times in the past year  have you...    Used an illegal drug or used a prescription medication for non-medical reasons? Never Filed at: 2025                            History of Present Illness       Chief Complaint   Patient presents with    Medical Problem     Feeling off. Dizzy, legs heavy. FSBS done at home and was 150s. Roughly 30 minutes ago. States just feels anxious now. Denies CP, SOB, Abdominal pain. +Nausea, -vomiting. States /100s at home       Past Medical History:   Diagnosis Date    Acid reflux     Diabetes mellitus (HCC)     GERD (gastroesophageal reflux disease)     Hypertension     TRES (obstructive sleep apnea)     Panic disorder     SVT (supraventricular tachycardia) (HCC)       Past Surgical History:   Procedure Laterality Date    AV NODE ABLATION      CARDIAC SURGERY      CYST REMOVAL      FL LAPAROSCOPY SURG CHOLECYSTECTOMY N/A 2019    Procedure: CHOLECYSTECTOMY LAPAROSCOPIC;  Surgeon: Rodrigo Genao DO;  Location: [facility];  Service: General      Family History   Problem Relation Age of Onset    Diabetes Mother     Kidney disease Mother     Liver cancer Mother     Heart disease Mother     Hypertension Mother     Thyroid disease Mother     Diabetes Sister     Hypertension Sister     Thyroid disease Father     Stroke Maternal Uncle       Social History     Tobacco Use    Smoking status: Former     Current packs/day: 0.00     Types: Cigarettes     Quit date: 2013     Years since quittin.3     Passive exposure: Never    Smokeless tobacco: Never   Vaping Use    Vaping status: Never Used   Substance Use Topics    Alcohol use: Yes     Comment: social    Drug use: Never      E-Cigarette/Vaping    E-Cigarette Use Never User       E-Cigarette/Vaping Substances    Nicotine No     THC No     CBD No     Flavoring No       I have reviewed and agree with the history as documented.     59-year-old male past medical history includes HTN, obesity, history of AVNRT status post ablation, T2DM, panic  "disorder, anxiety, former tobacco use, presented the ER for evaluation of multiple complaints.  Patient reports approximately 45 minutes ago at home while doing light activity around the house he began to feel anxious, nauseous, lightheaded.  States he has been feeling \"off\" for the past several days.  Reports compliance with his medications.  States checked his heart rate and it was \"irregular\" and's checked his blood glucose and it was in the 150s and states his blood pressure was in the 160s over 100s.  He decided come to the ER for evaluation.  He states he has felt like this in the past.  He states that years ago when he had the arrhythmia he felt similar but also had significant palpitations which she does not currently have.  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  Denies any shortness of breath, chest pain.  When asked notes some tingling like paresthesias in his fingertips denies any weakness or other sensory complaints.  Those symptoms are mild and resolving.  No vision disturbance.  No syncope.  No seizures.      Medical Problem  Associated symptoms: nausea    Associated symptoms: no abdominal pain, no chest pain, no cough, no diarrhea, no ear pain, no fever, no headaches, no rash, no shortness of breath, no sore throat and no vomiting        Review of Systems   Constitutional:  Positive for activity change. Negative for chills and fever.   HENT:  Negative for ear pain and sore throat.    Eyes:  Negative for pain and visual disturbance.   Respiratory:  Negative for cough and shortness of breath.    Cardiovascular:  Negative for chest pain and palpitations.   Gastrointestinal:  Positive for nausea. Negative for abdominal pain, diarrhea and vomiting.   Genitourinary:  " Negative for dysuria and hematuria.   Musculoskeletal:  Negative for arthralgias, back pain and neck pain.   Skin:  Negative for color change and rash.   Neurological:  Positive for dizziness, light-headedness and numbness. Negative for tremors, seizures, syncope, facial asymmetry, speech difficulty, weakness and headaches.   Psychiatric/Behavioral:  The patient is nervous/anxious.    All other systems reviewed and are negative.          Objective       ED Triage Vitals   Temperature Pulse Blood Pressure Respirations SpO2 Patient Position - Orthostatic VS   03/19/25 2005 03/19/25 2005 03/19/25 2005 03/19/25 2005 03/19/25 2005 03/19/25 2120   97.9 °F (36.6 °C) 73 (!) 204/98 19 99 % Lying      Temp src Heart Rate Source BP Location FiO2 (%) Pain Score    -- -- 03/19/25 2120 -- 03/19/25 2005      Right arm  No Pain      Vitals      Date and Time Temp Pulse SpO2 Resp BP Pain Score FACES Pain Rating User   03/19/25 2120 -- -- -- -- 153/80 -- -- NK   03/19/25 2005 97.9 °F (36.6 °C) 73 99 % 19 204/98 No Pain -- CP            Physical Exam  Vitals and nursing note reviewed. Exam conducted with a chaperone present.   Constitutional:       General: He is not in acute distress.     Appearance: Normal appearance. He is well-developed. He is obese. He is not ill-appearing, toxic-appearing or diaphoretic.   HENT:      Head: Normocephalic and atraumatic.      Right Ear: External ear normal.      Left Ear: External ear normal.      Nose: Nose normal.      Mouth/Throat:      Mouth: Mucous membranes are moist.      Pharynx: Oropharynx is clear.   Eyes:      Conjunctiva/sclera: Conjunctivae normal.   Cardiovascular:      Rate and Rhythm: Normal rate and regular rhythm.      Heart sounds: No murmur heard.  Pulmonary:      Effort: Pulmonary effort is normal. No respiratory distress.      Breath sounds: Normal breath sounds. No wheezing, rhonchi or rales.   Abdominal:      Palpations: Abdomen is soft.      Tenderness: There is no  abdominal tenderness. There is no guarding or rebound.   Musculoskeletal:         General: No swelling.      Cervical back: Neck supple.      Right lower leg: No edema.      Left lower leg: No edema.   Skin:     General: Skin is warm and dry.      Capillary Refill: Capillary refill takes less than 2 seconds.   Neurological:      General: No focal deficit present.      Mental Status: He is alert.   Psychiatric:         Mood and Affect: Mood normal.         Results Reviewed       Procedure Component Value Units Date/Time    TSH, 3rd generation with Free T4 reflex [[id number]]  (Normal) Collected: 03/19/25 2025    Lab Status: Final result Specimen: Blood from Arm, Left Updated: 03/19/25 2103     TSH 3RD GENERATON 2.797 uIU/mL     HS Troponin 0hr (reflex protocol) [[id number]]  (Normal) Collected: 03/19/25 2025    Lab Status: Final result Specimen: Blood from Arm, Left Updated: 03/19/25 2054     hs TnI 0hr <2 ng/L     HS Troponin I 2hr [[id number]]     Lab Status: No result Specimen: Blood     Comprehensive metabolic panel [[id number]]  (Abnormal) Collected: 03/19/25 2025    Lab Status: Final result Specimen: Blood from Arm, Left Updated: 03/19/25 2049     Sodium 140 mmol/L      Potassium 3.4 mmol/L      Chloride 103 mmol/L      CO2 27 mmol/L      ANION GAP 10 mmol/L      BUN 12 mg/dL      Creatinine 1.15 mg/dL      Glucose 111 mg/dL      Calcium 9.5 mg/dL      AST 15 U/L      ALT 27 U/L      Alkaline Phosphatase 72 U/L      Total Protein 7.6 g/dL      Albumin 4.4 g/dL      Total Bilirubin 0.44 mg/dL      eGFR 69 ml/min/1.73sq m     Narrative:      National Kid[employer] Foundation guidelines for Chronic Kidney Disease (CKD):     Stage 1 with normal or high GFR (GFR > 90 mL/min/1.73 square meters)    Stage 2 Mild CKD (GFR = 60-89 mL/min/1.73 square meters)    Stage 3A Moderate CKD (GFR = 45-59 mL/min/1.73 square meters)    Stage 3B Moderate CKD (GFR = 30-44 mL/min/1.73 square meters)    Stage 4 Severe CKD (GFR = 15-29  mL/min/1.73 square meters)    Stage 5 End Stage CKD (GFR <15 mL/min/1.73 square meters)  Note: GFR calculation is accurate only with a steady state creatinine    Magnesium [[id number]]  (Normal) Collected: 03/19/25 2025    Lab Status: Final result Specimen: Blood from Arm, Left Updated: 03/19/25 2049     Magnesium 2.1 mg/dL     CBC and differential [[id number]] Collected: 03/19/25 2025    Lab Status: Final result Specimen: Blood from Arm, Left Updated: 03/19/25 2032     WBC 8.34 Thousand/uL      RBC 5.07 Million/uL      Hemoglobin 15.1 g/dL      Hematocrit 44.3 %      MCV 87 fL      MCH 29.8 pg      MCHC 34.1 g/dL      RDW 12.4 %      MPV 10.4 fL      Platelets 207 Thousands/uL      nRBC 0 /100 WBCs      Segmented % 49 %      Immature Grans % 0 %      Lymphocytes % 32 %      Monocytes % 12 %      Eosinophils Relative 6 %      Basophils Relative 1 %      Absolute Neutrophils 4.06 Thousands/µL      Absolute Immature Grans 0.02 Thousand/uL      Absolute Lymphocytes 2.69 Thousands/µL      Absolute Monocytes 1.00 Thousand/µL      Eosinophils Absolute 0.47 Thousand/µL      Basophils Absolute 0.10 Thousands/µL     UA w Reflex to Microscopic w Reflex to Culture [[id number]]     Lab Status: No result Specimen: Urine, Clean Catch             No orders to display       Procedures    ED Medication and Procedure Management   Prior to Admission Medications   Prescriptions Last Dose Informant Patient Reported? Taking?   Accu-Chek FastClix Lancets MISC  Self No No   Sig: Test blood sugar once daily for fluctuating blood sugars   Patient not taking: Reported on 8/3/2024   Blood Glucose Monitoring Suppl (Accu-Chek Joya Plus) w/Device KIT  Self No No   Sig: Test blood sugar once daily for fluctuating blood sugars   aspirin 81 MG tablet  Self Yes No   Sig: Take 1 tablet by mouth daily   atorvastatin (LIPITOR) 10 mg tablet  Self Yes No   Sig: Take 1 tablet by mouth daily   Patient not taking: Reported on 5/21/2024   benzonatate (TESSALON)  200 MG capsule  Self No No   Sig: Take 1 capsule (200 mg total) by mouth 3 (three) times a day as needed for cough   Patient not taking: Reported on 2024   benzonatate (TESSALON) 200 MG capsule  Self No No   Sig: Take 1 capsule (200 mg total) by mouth 3 (three) times a day as needed for cough   Patient not taking: Reported on 11/15/2024   cephalexin (KEFLEX) 500 mg capsule   No No   Sig: Take 1 capsule (500 mg total) by mouth every 8 (eight) hours for 7 days   fluticasone (FLONASE) 50 mcg/act nasal spray  Self No No   Si spray into each nostril daily   glucose blood (Accu-Chek Joya Plus) test strip  Self No No   Sig: Test blood sugar once daily for fluctuating blood sugars   lisinopril (ZESTRIL) 10 mg tablet  Self Yes No   Sig: Take 1 tablet by mouth daily   Patient not taking: Reported on 2024   meloxicam (MOBIC) 7.5 mg tablet  Self No No   Sig: Take 1 tablet (7.5 mg total) by mouth daily as needed for moderate pain   Patient not taking: Reported on 8/3/2024   metFORMIN (GLUCOPHAGE-XR) 500 mg 24 hr tablet  Self No No   Sig: Take 1 tablet (500 mg total) by mouth 2 (two) times a day with meals   mupirocin (BACTROBAN) 2 % ointment   No No   Sig: Apply topically daily   omeprazole (PriLOSEC OTC) 20 MG tablet  Self No No   Sig: Take 1 tablet (20 mg total) by mouth daily   tamsulosin (FLOMAX) 0.4 mg  Self No No   Sig: Take 1 capsule (0.4 mg total) by mouth daily with dinner      Facility-Administered Medications: None     Patient's Medications   Discharge Prescriptions    No medications on file     No discharge procedures on file.  ED SEPSIS DOCUMENTATION   Time reflects when diagnosis was documented in both MDM as applicable and the Disposition within this note       Time User Action Codes Description Comment    3/19/2025  9:23 PM Sky Hamlin [Z13.9] Encounter for medical screening examination     3/19/2025  9:23 PM Sky Hamlin [F41.9] Anxiety                  Sky Hamlin,  DO  03/19/25 2129

## 2025-03-24 ENCOUNTER — OFFICE VISIT (OUTPATIENT)
Dept: FAMILY MEDICINE CLINIC | Facility: CLINIC | Age: 60
End: 2025-03-24
Payer: COMMERCIAL

## 2025-03-24 VITALS
OXYGEN SATURATION: 98 % | WEIGHT: 216.4 LBS | RESPIRATION RATE: 17 BRPM | TEMPERATURE: 96.3 F | HEIGHT: 64 IN | BODY MASS INDEX: 36.95 KG/M2 | DIASTOLIC BLOOD PRESSURE: 84 MMHG | HEART RATE: 58 BPM | SYSTOLIC BLOOD PRESSURE: 150 MMHG

## 2025-03-24 DIAGNOSIS — N40.1 BENIGN PROSTATIC HYPERPLASIA WITH LOWER URINARY TRACT SYMPTOMS, SYMPTOM DETAILS UNSPECIFIED: ICD-10-CM

## 2025-03-24 DIAGNOSIS — E11.9 TYPE 2 DIABETES MELLITUS WITHOUT COMPLICATION, WITHOUT LONG-TERM CURRENT USE OF INSULIN (HCC): ICD-10-CM

## 2025-03-24 DIAGNOSIS — I10 PRIMARY HYPERTENSION: Primary | ICD-10-CM

## 2025-03-24 DIAGNOSIS — E66.01 SEVERE OBESITY (BMI 35.0-35.9 WITH COMORBIDITY) (HCC): ICD-10-CM

## 2025-03-24 PROCEDURE — G2211 COMPLEX E/M VISIT ADD ON: HCPCS | Performed by: FAMILY MEDICINE

## 2025-03-24 PROCEDURE — 99214 OFFICE O/P EST MOD 30 MIN: CPT | Performed by: FAMILY MEDICINE

## 2025-03-24 RX ORDER — LISINOPRIL 10 MG/1
10 TABLET ORAL DAILY
Qty: 90 TABLET | Refills: 0 | Status: SHIPPED | OUTPATIENT
Start: 2025-03-24

## 2025-03-25 NOTE — ASSESSMENT & PLAN NOTE
Previously well-controlled off lisinopril but blood pressures have been above goal.  Restart lisinopril 10 mg daily.  Return back in 2 to 4 weeks.  Continue to monitor blood pressures at home.  He will bring blood pressure cuff with him next time so we can compare to manual read to make sure is accurate.    Orders:    lisinopril (ZESTRIL) 10 mg tablet; Take 1 tablet (10 mg total) by mouth daily

## 2025-03-25 NOTE — ASSESSMENT & PLAN NOTE
He has been on and off Flomax and reports it works pretty well but sometimes still has trouble going to the bathroom while using it.  I recommended he get PSA and we can discuss his symptoms and plan for BPH at his follow-up appointment.  We are restarting lisinopril today so restarting Flomax may drop his blood pressure too much.  He reports that his symptoms are decently controlled with Flomax.  He may benefit trial of higher dose of 0.8 mg daily.

## 2025-03-25 NOTE — ASSESSMENT & PLAN NOTE
Lab Results   Component Value Date    HGBA1C 6.5 (H) 11/25/2024     Overdue for lab work.  Has labs active in chart.  He will get them done after visit.  Continue metformin and dietary lifestyle modification.  Will follow-up with labs once completed.

## 2025-03-25 NOTE — PROGRESS NOTES
Name: Rafael Bradford      : 1965      MRN: [id number]  Encounter Provider: Skip Clifford MD  Encounter Date: 3/24/2025   Encounter department: [facility] AT [facility]  :  Assessment & Plan  Primary hypertension  Previously well-controlled off lisinopril but blood pressures have been above goal.  Restart lisinopril 10 mg daily.  Return back in 2 to 4 weeks.  Continue to monitor blood pressures at home.  He will bring blood pressure cuff with him next time so we can compare to manual read to make sure is accurate.    Orders:    lisinopril (ZESTRIL) 10 mg tablet; Take 1 tablet (10 mg total) by mouth daily    Severe obesity (BMI 35.0-35.9 with comorbidity) ([facility])  Diet lifestyle modification.       Type 2 diabetes mellitus without complication, without long-term current use of insulin ([facility])    Lab Results   Component Value Date    HGBA1C 6.5 (H) 2024     Overdue for lab work.  Has labs active in chart.  He will get them done after visit.  Continue metformin and dietary lifestyle modification.  Will follow-up with labs once completed.       Benign prostatic hyperplasia with lower urinary tract symptoms, symptom details unspecified  He has been on and off Flomax and reports it works pretty well but sometimes still has trouble going to the bathroom while using it.  I recommended he get PSA and we can discuss his symptoms and plan for BPH at his follow-up appointment.  We are restarting lisinopril today so restarting Flomax may drop his blood pressure too much.  He reports that his symptoms are decently controlled with Flomax.  He may benefit trial of higher dose of 0.8 mg daily.              History of Present Illness   Patient presents office today to reestablish care.  He was lost to follow-up and is now back.  He has history of hypertension and diabetes.  His diabetes is usually well-controlled and he is on metformin.  Has never been on insulin.  Denies any symptoms of hyperglycemia today.   "His blood pressures are also well-controlled in the past and we took him off lisinopril however he reports that his blood pressures have been high at home recently.  He also getting some headaches but not sure if from blood pressures or not.  Currently not taking anything for hypertension.  He forgot to do labs that were ordered before visit.    He has history of BPH.  He reports that Flomax helps his symptoms but sometimes has difficulty urinating.  He is currently not taking it.  He says doing without taking it his symptoms are decently controlled.  No blood in urine.  He still has not gotten labs done.  This included PSA.    Diabetes    Hypertension      Review of Systems   All other systems reviewed and are negative.      Objective   /84 (BP Location: Left arm, Patient Position: Sitting, Cuff Size: Standard)   Pulse 58   Temp (!) 96.3 °F (35.7 °C) (Tympanic)   Resp 17   Ht 5' 4\" (1.626 m)   Wt 98.2 kg (216 lb 6.4 oz)   SpO2 98%   BMI 37.14 kg/m²      Physical Exam  Vitals and nursing note reviewed.   Constitutional:       General: He is not in acute distress.     Appearance: Normal appearance. He is well-developed. He is not ill-appearing, toxic-appearing or diaphoretic.   HENT:      Head: Normocephalic and atraumatic.   Eyes:      General:         Right eye: No discharge.         Left eye: No discharge.      Extraocular Movements: Extraocular movements intact.      Conjunctiva/sclera: Conjunctivae normal.   Cardiovascular:      Rate and Rhythm: Normal rate.      Pulses:           Dorsalis pedis pulses are 2+ on the right side and 2+ on the left side.        Posterior tibial pulses are 2+ on the right side and 2+ on the left side.   Pulmonary:      Effort: Pulmonary effort is normal.   Musculoskeletal:      Cervical back: Normal range of motion and neck supple.   Feet:      Right foot:      Skin integrity: No ulcer, skin breakdown, erythema, warmth, callus or dry skin.      Left foot:      Skin " integrity: No ulcer, skin breakdown, erythema, warmth, callus or dry skin.   Skin:     General: Skin is dry.      Capillary Refill: Capillary refill takes less than 2 seconds.   Neurological:      Mental Status: He is alert and oriented to person, place, and time.   Psychiatric:         Mood and Affect: Mood normal.         Behavior: Behavior normal.         Thought Content: Thought content normal.         Judgment: Judgment normal.

## 2025-03-26 ENCOUNTER — APPOINTMENT (OUTPATIENT)
Dept: LAB | Facility: MEDICAL CENTER | Age: 60
End: 2025-03-26
Payer: COMMERCIAL

## 2025-03-26 DIAGNOSIS — Z12.5 SCREENING FOR MALIGNANT NEOPLASM OF PROSTATE: ICD-10-CM

## 2025-03-26 DIAGNOSIS — E11.9 TYPE 2 DIABETES MELLITUS WITHOUT COMPLICATION, WITHOUT LONG-TERM CURRENT USE OF INSULIN (HCC): ICD-10-CM

## 2025-03-26 LAB
ALBUMIN SERPL BCG-MCNC: 4.3 G/DL (ref 3.5–5)
ALP SERPL-CCNC: 71 U/L (ref 34–104)
ALT SERPL W P-5'-P-CCNC: 30 U/L (ref 7–52)
ANION GAP SERPL CALCULATED.3IONS-SCNC: 7 MMOL/L (ref 4–13)
AST SERPL W P-5'-P-CCNC: 19 U/L (ref 13–39)
BILIRUB SERPL-MCNC: 0.66 MG/DL (ref 0.2–1)
BUN SERPL-MCNC: 13 MG/DL (ref 5–25)
CALCIUM SERPL-MCNC: 9.2 MG/DL (ref 8.4–10.2)
CHLORIDE SERPL-SCNC: 105 MMOL/L (ref 96–108)
CHOLEST SERPL-MCNC: 231 MG/DL (ref ?–200)
CO2 SERPL-SCNC: 27 MMOL/L (ref 21–32)
CREAT SERPL-MCNC: 0.96 MG/DL (ref 0.6–1.3)
EST. AVERAGE GLUCOSE BLD GHB EST-MCNC: 143 MG/DL
GFR SERPL CREATININE-BSD FRML MDRD: 86 ML/MIN/1.73SQ M
GLUCOSE P FAST SERPL-MCNC: 132 MG/DL (ref 65–99)
HBA1C MFR BLD: 6.6 %
HDLC SERPL-MCNC: 40 MG/DL
LDLC SERPL CALC-MCNC: 135 MG/DL (ref 0–100)
NONHDLC SERPL-MCNC: 191 MG/DL
POTASSIUM SERPL-SCNC: 3.8 MMOL/L (ref 3.5–5.3)
PROT SERPL-MCNC: 7.7 G/DL (ref 6.4–8.4)
PSA SERPL-MCNC: 0.65 NG/ML (ref 0–4)
SODIUM SERPL-SCNC: 139 MMOL/L (ref 135–147)
TRIGL SERPL-MCNC: 280 MG/DL (ref ?–150)

## 2025-03-26 PROCEDURE — 36415 COLL VENOUS BLD VENIPUNCTURE: CPT

## 2025-03-26 PROCEDURE — G0103 PSA SCREENING: HCPCS

## 2025-03-26 PROCEDURE — 80061 LIPID PANEL: CPT

## 2025-03-26 PROCEDURE — 80053 COMPREHEN METABOLIC PANEL: CPT

## 2025-03-26 PROCEDURE — 83036 HEMOGLOBIN GLYCOSYLATED A1C: CPT

## 2025-03-27 ENCOUNTER — RESULTS FOLLOW-UP (OUTPATIENT)
Dept: FAMILY MEDICINE CLINIC | Facility: CLINIC | Age: 60
End: 2025-03-27

## 2025-04-02 ENCOUNTER — RA CDI HCC (OUTPATIENT)
Dept: OTHER | Facility: HOSPITAL | Age: 60
End: 2025-04-02

## 2025-04-08 ENCOUNTER — OFFICE VISIT (OUTPATIENT)
Dept: FAMILY MEDICINE CLINIC | Facility: CLINIC | Age: 60
End: 2025-04-08
Payer: COMMERCIAL

## 2025-04-08 VITALS
TEMPERATURE: 98.2 F | WEIGHT: 218.8 LBS | SYSTOLIC BLOOD PRESSURE: 134 MMHG | BODY MASS INDEX: 37.36 KG/M2 | OXYGEN SATURATION: 97 % | HEART RATE: 79 BPM | DIASTOLIC BLOOD PRESSURE: 84 MMHG | HEIGHT: 64 IN

## 2025-04-08 DIAGNOSIS — I10 PRIMARY HYPERTENSION: Primary | ICD-10-CM

## 2025-04-08 DIAGNOSIS — E11.9 TYPE 2 DIABETES MELLITUS WITHOUT COMPLICATION, WITHOUT LONG-TERM CURRENT USE OF INSULIN (HCC): ICD-10-CM

## 2025-04-08 DIAGNOSIS — N40.1 BENIGN PROSTATIC HYPERPLASIA WITH LOWER URINARY TRACT SYMPTOMS, SYMPTOM DETAILS UNSPECIFIED: ICD-10-CM

## 2025-04-08 PROCEDURE — 99214 OFFICE O/P EST MOD 30 MIN: CPT | Performed by: FAMILY MEDICINE

## 2025-04-08 PROCEDURE — G2211 COMPLEX E/M VISIT ADD ON: HCPCS | Performed by: FAMILY MEDICINE

## 2025-04-08 RX ORDER — TAMSULOSIN HYDROCHLORIDE 0.4 MG/1
0.4 CAPSULE ORAL
Qty: 90 CAPSULE | Refills: 0 | Status: SHIPPED | OUTPATIENT
Start: 2025-04-08

## 2025-04-08 RX ORDER — METFORMIN HYDROCHLORIDE 500 MG/1
500 TABLET, EXTENDED RELEASE ORAL 2 TIMES DAILY WITH MEALS
Qty: 200 TABLET | Refills: 1 | Status: SHIPPED | OUTPATIENT
Start: 2025-04-08

## 2025-04-08 RX ORDER — ATORVASTATIN CALCIUM 10 MG/1
10 TABLET, FILM COATED ORAL DAILY
Qty: 90 TABLET | Refills: 0 | Status: SHIPPED | OUTPATIENT
Start: 2025-04-08

## 2025-04-08 NOTE — ASSESSMENT & PLAN NOTE
Uncontrolled.  Having symptoms of hesitancy, dribbling and nocturia.  I recommend patient restart Flomax.  In the past he was taking this inconsistently.  I recommended compliance with daily dosing.  Follow-up in 8 weeks.  PSA was negative.  Orders:    tamsulosin (FLOMAX) 0.4 mg; Take 1 capsule (0.4 mg total) by mouth daily with dinner

## 2025-04-08 NOTE — PROGRESS NOTES
"Name: Rafael Bradford      : 1965      MRN: [id number]  Encounter Provider: Skip Clifford MD  Encounter Date: 2025   Encounter department: [facility] AT [place]  :  Assessment & Plan  Type 2 diabetes mellitus without complication, without long-term current use of insulin ([facility])    Lab Results   Component Value Date    HGBA1C 6.6 (H) 2025   Controlled.  Continue metformin and diet and lifestyle modification.  Restart Lipitor.  Orders:    metFORMIN (GLUCOPHAGE-XR) 500 mg 24 hr tablet; Take 1 tablet (500 mg total) by mouth 2 (two) times a day with meals    atorvastatin (LIPITOR) 10 mg tablet; Take 1 tablet (10 mg total) by mouth daily    Benign prostatic hyperplasia with lower urinary tract symptoms, symptom details unspecified  Uncontrolled.  Having symptoms of hesitancy, dribbling and nocturia.  I recommend patient restart Flomax.  In the past he was taking this inconsistently.  I recommended compliance with daily dosing.  Follow-up in 8 weeks.  PSA was negative.  Orders:    tamsulosin (FLOMAX) 0.4 mg; Take 1 capsule (0.4 mg total) by mouth daily with dinner    Primary hypertension  Controlled after restarting lisinopril.  Continue lisinopril 10 mg daily.                History of Present Illness   Patient presents office today for follow-up.  Had lab work done.  Last office visit we restarted his lisinopril.  He reports that he has been having trouble going the bathroom.  This is chronic but has not gotten any better.  In the past he was on Flomax but not taking every day.  He is having dribbling and hesitancy.  He is also waking up frequently at nighttime.  Blood pressures have been better at home after starting lisinopril.  No headaches or symptoms of elevated hypertension.      Review of Systems   All other systems reviewed and are negative.      Objective   /84   Pulse 79   Temp 98.2 °F (36.8 °C) (Tympanic)   Ht 5' 4\" (1.626 m)   Wt 99.2 kg (218 lb 12.8 oz)   SpO2 97%  "  BMI 37.56 kg/m²      Physical Exam  Vitals and nursing note reviewed.   Constitutional:       General: He is not in acute distress.     Appearance: Normal appearance. He is well-developed. He is not ill-appearing, toxic-appearing or diaphoretic.   HENT:      Head: Normocephalic and atraumatic.   Eyes:      General:         Right eye: No discharge.         Left eye: No discharge.      Extraocular Movements: Extraocular movements intact.      Conjunctiva/sclera: Conjunctivae normal.   Cardiovascular:      Rate and Rhythm: Normal rate.      Pulses:           Dorsalis pedis pulses are 2+ on the right side and 2+ on the left side.        Posterior tibial pulses are 2+ on the right side and 2+ on the left side.   Pulmonary:      Effort: Pulmonary effort is normal.   Musculoskeletal:      Cervical back: Normal range of motion and neck supple.   Feet:      Right foot:      Skin integrity: No ulcer, skin breakdown, erythema, warmth, callus or dry skin.      Left foot:      Skin integrity: No ulcer, skin breakdown, erythema, warmth, callus or dry skin.   Skin:     General: Skin is dry.      Capillary Refill: Capillary refill takes less than 2 seconds.   Neurological:      Mental Status: He is alert and oriented to person, place, and time.   Psychiatric:         Mood and Affect: Mood normal.         Behavior: Behavior normal.         Thought Content: Thought content normal.         Judgment: Judgment normal.

## 2025-04-08 NOTE — ASSESSMENT & PLAN NOTE
Lab Results   Component Value Date    HGBA1C 6.6 (H) 03/26/2025   Controlled.  Continue metformin and diet and lifestyle modification.  Restart Lipitor.  Orders:    metFORMIN (GLUCOPHAGE-XR) 500 mg 24 hr tablet; Take 1 tablet (500 mg total) by mouth 2 (two) times a day with meals    atorvastatin (LIPITOR) 10 mg tablet; Take 1 tablet (10 mg total) by mouth daily

## 2025-04-21 ENCOUNTER — HOSPITAL ENCOUNTER (EMERGENCY)
Facility: HOSPITAL | Age: 60
Discharge: HOME | End: 2025-04-21
Attending: EMERGENCY MEDICINE
Payer: COMMERCIAL

## 2025-04-21 ENCOUNTER — APPOINTMENT (EMERGENCY)
Dept: RADIOLOGY | Facility: HOSPITAL | Age: 60
End: 2025-04-21
Payer: COMMERCIAL

## 2025-04-21 VITALS
SYSTOLIC BLOOD PRESSURE: 144 MMHG | DIASTOLIC BLOOD PRESSURE: 77 MMHG | OXYGEN SATURATION: 99 % | RESPIRATION RATE: 16 BRPM | WEIGHT: 219 LBS | HEART RATE: 49 BPM | BODY MASS INDEX: 37.59 KG/M2 | TEMPERATURE: 97.9 F

## 2025-04-21 DIAGNOSIS — S61.419A HAND LACERATION: ICD-10-CM
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
## 2025-04-21 PROCEDURE — 73130 X-RAY EXAM OF HAND: CPT

## 2025-04-21 PROCEDURE — 99283 EMERGENCY DEPT VISIT LOW MDM: CPT

## 2025-04-21 PROCEDURE — 12001 RPR S/N/AX/GEN/TRNK 2.5CM/<: CPT | Performed by: EMERGENCY MEDICINE

## 2025-04-21 PROCEDURE — 99284 EMERGENCY DEPT VISIT MOD MDM: CPT | Performed by: EMERGENCY MEDICINE

## 2025-04-21 RX ADMIN — AMOXICILLIN AND CLAVULANATE POTASSIUM 1 TABLET: 875; 125 TABLET, FILM COATED ORAL at 20:36

## 2025-04-22 NOTE — ED NOTES
Animal bite puncture wounds cleansed with normal saline at this time.      Viviana Biswas RN  04/21/25 2023

## 2025-04-22 NOTE — ED PROVIDER NOTES
Time reflects when diagnosis was documented in both MDM as applicable and the Disposition within this note       Time User Action Codes Description Comment    4/21/2025  9:54 PM Marychuy Sainz Add [W54.0XXA] Bite from dog     4/21/2025  9:58 PM Marychuy Sainz Add [S61.419A] Hand laceration     4/21/2025  9:58 PM Marychuy Sainz Modify [S61.419A] Hand laceration simple repair with 2 sutures (2cm total)          ED Disposition       ED Disposition   Discharge    Condition   Stable    Date/Time   Mon Apr 21, 2025  9:54 PM    Comment   Rafael Bradford discharge to home/self care.                   Assessment & Plan       Medical Decision Making  59-year-old male right-hand-dominant sustained a dog bite from his own two yorkshire terrier dogs they are immunized was attempging to breaking up a dog Discussed rabies prophylaxis not indicated as the animals can be observed.  Patient's tetanus is up-to-date.  He sustained bite wounds to the right thenar eminence.  Patient will get plain film evaluation to check for fracture or retained foreign body which were felt to be less likely.  Recommend laceration repair will cover with 1 week course of Augmentin.  Is otherwise neurovascularly intact no clinical evidence of tendon injury    Amount and/or Complexity of Data Reviewed  Radiology: ordered and independent interpretation performed.    Risk  Prescription drug management.        ED Course as of 04/21/25 2334   Mon Apr 21, 2025   2152 Recheck HR and pulse ox 61 99% on RA       Medications   amoxicillin-clavulanate (AUGMENTIN) 875-125 mg per tablet 1 tablet (1 tablet Oral Given 4/21/25 2036)       ED Risk Strat Scores                    No data recorded        SBIRT 20yo+      Flowsheet Row Most Recent Value   Initial Alcohol Screen: US AUDIT-C     1. How often do you have a drink containing alcohol? 0 Filed at: 04/21/2025 1934   2. How many drinks containing alcohol do you have on a typical day you are drinking?  0 Filed  at: 2025   3a. Male UNDER 65: How often do you have five or more drinks on one occasion? 0 Filed at: 2025   3b. FEMALE Any Age, or MALE 65+: How often do you have 4 or more drinks on one occassion? 0 Filed at: 2025   Audit-C Score 0 Filed at: 2025   MARCIANO: How many times in the past year have you...    Used an illegal drug or used a prescription medication for non-medical reasons? Never Filed at: 2025                            History of Present Illness       Chief Complaint   Patient presents with    Animal Bite     Right hand dog bite [place] to [place] dog is the owner pet        Past Medical History:   Diagnosis Date    Acid reflux     Diabetes mellitus (HCC)     GERD (gastroesophageal reflux disease)     Hypertension     TRES (obstructive sleep apnea)     Panic disorder     SVT (supraventricular tachycardia) (HCC)       Past Surgical History:   Procedure Laterality Date    AV NODE ABLATION      CARDIAC SURGERY      CYST REMOVAL      WA LAPAROSCOPY SURG CHOLECYSTECTOMY N/A 2019    Procedure: CHOLECYSTECTOMY LAPAROSCOPIC;  Surgeon: Rodrigo Genao DO;  Location: [facility];  Service: General      Family History   Problem Relation Age of Onset    Diabetes Mother     Kidney disease Mother     Liver cancer Mother     Heart disease Mother     Hypertension Mother     Thyroid disease Mother     Diabetes Sister     Hypertension Sister     Thyroid disease Father     Stroke Maternal Uncle       Social History     Tobacco Use    Smoking status: Former     Current packs/day: 0.00     Types: Cigarettes     Quit date: 2013     Years since quittin.4     Passive exposure: Never    Smokeless tobacco: Never   Vaping Use    Vaping status: Never Used   Substance Use Topics    Alcohol use: Yes     Comment: social    Drug use: Never      E-Cigarette/Vaping    E-Cigarette Use Never User       E-Cigarette/Vaping Substances    Nicotine No     THC No     CBD No      Flavoring No       I have reviewed and agree with the history as documented.     59 -year-old right-hand-dominant male was attempting to break up a dog fight between his 2 Hessel terrier's animals are known to him they are acting normal afterwards.  They are immunized.  Patient denies any numbness or tingling he has pain to the right thenar eminence has pain with range of motion but no weakness to his hand to this he was in his usual state of health last tetanus is 7/24/2020  Has medical history SVT GERD hypertension diabetes past surgical history cholecystectomy cyst removal and AV ebony ablation        Review of Systems   Constitutional:  Negative for activity change, appetite change, chills and fever.   Skin:  Positive for wound.   Neurological:  Negative for weakness and numbness.   All other systems reviewed and are negative.          Objective       ED Triage Vitals [04/21/25 1932]   Temperature Pulse Blood Pressure Respirations SpO2 Patient Position - Orthostatic VS   97.9 °F (36.6 °C) 70 163/89 18 98 % Sitting      Temp Source Heart Rate Source BP Location FiO2 (%) Pain Score    Temporal Monitor Left arm -- 3      Vitals      Date and Time Temp Pulse SpO2 Resp BP Pain Score FACES Pain Rating User   04/21/25 2100 -- 49 99 % 16 144/77 -- -- MD   04/21/25 1932 97.9 °F (36.6 °C) 70 98 % 18 163/89 3 -- RJP            Physical Exam  Vitals and nursing note reviewed.   Constitutional:       General: He is not in acute distress.     Appearance: He is not ill-appearing, toxic-appearing or diaphoretic.   Musculoskeletal:        Hands:       Comments: 2+ radial pulse patient is less than 4 mm 2 point discrimination to the radial ulnar aspect of all digits.  Patient is able to oppose his thumb to all digits is able to abduct and AB duct the thumb Place the thumb at the base of the fifth digit IP joint was isolated is able to flex and extend easily able to make an okay sign FDP and FDS were isolated found to be intact  in all digits he can extend against resistance and lumbricals are intact patient does have tenderness to the thenar eminence   Skin:     General: Skin is warm and dry.      Capillary Refill: Capillary refill takes less than 2 seconds.   Neurological:      Mental Status: He is alert.   Psychiatric:         Mood and Affect: Mood normal.         Results Reviewed       None            XR hand 3+ views RIGHT   ED Interpretation by Marychuy Sainz MD (04/21 2134)   Per my independent interpretation. Radiologist to provide formal read.  No acute fx or radioppaque fb          Universal Protocol:  procedure performed by consultantConsent: Verbal consent obtained.  Risks and benefits: risks, benefits and alternatives were discussed  Consent given by: patient  Patient understanding: patient states understanding of the procedure being performed  Patient identity confirmed: verbally with patient and arm band  Laceration repair    Date/Time: 4/21/2025 10:00 PM    Performed by: Marychuy Sainz MD  Authorized by: Marychuy Sainz MD  Body area: upper extremity  Location details: right hand  Laceration length: 2 (1 cm times 2) cm  Foreign bodies: no foreign bodies  Tendon involvement: none  Nerve involvement: none  Vascular damage: no  Anesthesia: local infiltration    Anesthesia:  Local Anesthetic: lidocaine 1% with epinephrine  Anesthetic total: 5 mL    Sedation:  Patient sedated: no      Wound Dehiscence:  Superficial Wound Dehiscence: simple closure      Procedure Details:  Preparation: Patient was prepped and draped in the usual sterile fashion.  Irrigation solution: saline  Irrigation method: syringe  Debridement: none  Skin closure: 5-0 nylon and Ethilon  Number of sutures: 2  Technique: horizontal mattress  Approximation: close  Approximation difficulty: simple  Dressing: 4x4 sterile gauze, pressure dressing and gauze roll (xeroform ace wrap)  Patient tolerance: patient tolerated the procedure well  with no immediate complications  Comments: Cms intact after placement of dressing          ED Medication and Procedure Management   Prior to Admission Medications   Prescriptions Last Dose Informant Patient Reported? Taking?   Accu-Chek FastClix Lancets MISC  Self No No   Sig: Test blood sugar once daily for fluctuating blood sugars   Patient not taking: Reported on 3/24/2025   Blood Glucose Monitoring Suppl (Accu-Chek Joya Plus) w/Device KIT  Self No No   Sig: Test blood sugar once daily for fluctuating blood sugars   aspirin 81 MG tablet  Self Yes No   Sig: Take 1 tablet by mouth daily   atorvastatin (LIPITOR) 10 mg tablet   No No   Sig: Take 1 tablet (10 mg total) by mouth daily   fluticasone (FLONASE) 50 mcg/act nasal spray  Self No No   Si spray into each nostril daily   glucose blood (Accu-Chek Joya Plus) test strip  Self No No   Sig: Test blood sugar once daily for fluctuating blood sugars   lisinopril (ZESTRIL) 10 mg tablet   No No   Sig: Take 1 tablet (10 mg total) by mouth daily   metFORMIN (GLUCOPHAGE-XR) 500 mg 24 hr tablet   No No   Sig: Take 1 tablet (500 mg total) by mouth 2 (two) times a day with meals   mupirocin (BACTROBAN) 2 % ointment  Self No No   Sig: Apply topically daily   omeprazole (PriLOSEC OTC) 20 MG tablet  Self No No   Sig: Take 1 tablet (20 mg total) by mouth daily   tamsulosin (FLOMAX) 0.4 mg   No No   Sig: Take 1 capsule (0.4 mg total) by mouth daily with dinner      Facility-Administered Medications: None     Discharge Medication List as of 2025  9:59 PM        START taking these medications    Details   amoxicillin-clavulanate (AUGMENTIN) 875-125 mg per tablet Take 1 tablet by mouth every 12 (twelve) hours for 7 days, Starting Mon 2025, Until 2025, Normal           CONTINUE these medications which have NOT CHANGED    Details   Accu-Chek FastClix Lancets MISC Test blood sugar once daily for fluctuating blood sugars, Normal      aspirin 81 MG tablet Take 1  tablet by mouth daily, Historical Med      atorvastatin (LIPITOR) 10 mg tablet Take 1 tablet (10 mg total) by mouth daily, Starting Tue 4/8/2025, Normal      Blood Glucose Monitoring Suppl (Accu-Chek Joya Plus) w/Device KIT Test blood sugar once daily for fluctuating blood sugars, Normal      fluticasone (FLONASE) 50 mcg/act nasal spray 1 spray into each nostril daily, Starting Sat 8/3/2024, Normal      glucose blood (Accu-Chek Joya Plus) test strip Test blood sugar once daily for fluctuating blood sugars, Normal      lisinopril (ZESTRIL) 10 mg tablet Take 1 tablet (10 mg total) by mouth daily, Starting Mon 3/24/2025, Normal      metFORMIN (GLUCOPHAGE-XR) 500 mg 24 hr tablet Take 1 tablet (500 mg total) by mouth 2 (two) times a day with meals, Starting Tue 4/8/2025, Normal      mupirocin (BACTROBAN) 2 % ointment Apply topically daily, Starting Fri 3/14/2025, Normal      omeprazole (PriLOSEC OTC) 20 MG tablet Take 1 tablet (20 mg total) by mouth daily, Starting Fri 6/7/2024, Normal      tamsulosin (FLOMAX) 0.4 mg Take 1 capsule (0.4 mg total) by mouth daily with dinner, Starting Tue 4/8/2025, Normal           No discharge procedures on file.  ED SEPSIS DOCUMENTATION   Time reflects when diagnosis was documented in both MDM as applicable and the Disposition within this note       Time User Action Codes Description Comment    4/21/2025  9:54 PM Marychuy Sainz [W54.0XXA] Bite from dog     4/21/2025  9:58 PM Marychuy Sainz [S61.419A] Hand laceration     4/21/2025  9:58 PM Marychuy Sainz [S61.419A] Hand laceration simple repair with 2 sutures (2cm total)                 Marychuy Sainz MD  04/21/25 9019

## 2025-04-22 NOTE — DISCHARGE INSTRUCTIONS
Wound clean and dry  Showering okay avoid pool water seawater and lake water until stitches are out  Bacitracin ointment twice daily  Ace wrap for comfort  Ice through the dressing  Finish 7 days of Augmentin  Aleve 2 tabs twice daily with food OR ibuprofen 200-800mg every 8 hours with food as needed for pain   Tylenol 650mg every 6 hours as needed for pain, fever (max 3000mg in 24 hours)   Probioitcs to help prevent diarrha  Return with any signs or symptoms of infection increased redness pus drainage

## 2025-04-28 ENCOUNTER — OFFICE VISIT (OUTPATIENT)
Dept: FAMILY MEDICINE CLINIC | Facility: CLINIC | Age: 60
End: 2025-04-28
Payer: COMMERCIAL

## 2025-04-28 ENCOUNTER — TELEPHONE (OUTPATIENT)
Age: 60
End: 2025-04-28

## 2025-04-28 VITALS
DIASTOLIC BLOOD PRESSURE: 86 MMHG | TEMPERATURE: 97.6 F | WEIGHT: 220 LBS | SYSTOLIC BLOOD PRESSURE: 134 MMHG | BODY MASS INDEX: 37.56 KG/M2 | HEIGHT: 64 IN | OXYGEN SATURATION: 98 % | HEART RATE: 80 BPM

## 2025-04-28 DIAGNOSIS — S61.411A LACERATION OF RIGHT HAND WITHOUT FOREIGN BODY, INITIAL ENCOUNTER: Primary | ICD-10-CM

## 2025-04-28 PROCEDURE — 99213 OFFICE O/P EST LOW 20 MIN: CPT | Performed by: FAMILY MEDICINE

## 2025-04-28 PROCEDURE — G2211 COMPLEX E/M VISIT ADD ON: HCPCS | Performed by: FAMILY MEDICINE

## 2025-04-28 NOTE — TELEPHONE ENCOUNTER
Patient called today and stated that he was supposed to have his suture removal for today and it is scheduled 05/01/2025. I went to call the office but by the time I tried to look on the schedule to see if Dr. Clifford had anything available today, the phone call dropped. Please call back [phone] thank you.

## 2025-04-28 NOTE — PROGRESS NOTES
"Name: Rafael Bradford      : 1965      MRN: [id number]  Encounter Provider: Skip Clifford MD  Encounter Date: 2025   Encounter department: [facility]  :  Assessment & Plan  Laceration of right hand without foreign body, initial encounter  To suture successfully removed.  Laceration is healing well no signs of infection.  Continue supportive care.  We have an appointment scheduled in 2 weeks.         Assessment & Plan           History of Present Illness   Patient returns office today for suture removal.  Was breaking up a dog fight with his 2 dogs and was accidentally bit.  They are up-to-date on their shots.  His sutures are healing well.  No redness around suture site no fever or chills.  No pain.  This happened over a week ago.    Suture / Staple Removal      Review of Systems   All other systems reviewed and are negative.      Objective   /86   Pulse 80   Temp 97.6 °F (36.4 °C) (Tympanic)   Ht 5' 4\" (1.626 m)   Wt 99.8 kg (220 lb)   SpO2 98%   BMI 37.76 kg/m²      Physical Exam  Vitals and nursing note reviewed.   Constitutional:       General: He is not in acute distress.     Appearance: Normal appearance. He is well-developed. He is not ill-appearing, toxic-appearing or diaphoretic.   HENT:      Head: Normocephalic and atraumatic.   Eyes:      General:         Right eye: No discharge.         Left eye: No discharge.      Extraocular Movements: Extraocular movements intact.      Conjunctiva/sclera: Conjunctivae normal.   Cardiovascular:      Rate and Rhythm: Normal rate.      Pulses:           Dorsalis pedis pulses are 2+ on the right side and 2+ on the left side.        Posterior tibial pulses are 2+ on the right side and 2+ on the left side.   Pulmonary:      Effort: Pulmonary effort is normal.   Musculoskeletal:      Cervical back: Normal range of motion and neck supple.   Feet:      Right foot:      Skin integrity: No ulcer, skin breakdown, erythema, " warmth, callus or dry skin.      Left foot:      Skin integrity: No ulcer, skin breakdown, erythema, warmth, callus or dry skin.   Skin:     General: Skin is dry.      Capillary Refill: Capillary refill takes less than 2 seconds.      Comments: 2 small lacerations on right hand.  1 on thenar area the other below.  No redness, bleeding or drainage.  No pain or swelling.  Lacerations both less than quarter of an inch long   Neurological:      Mental Status: He is alert and oriented to person, place, and time.   Psychiatric:         Mood and Affect: Mood normal.         Behavior: Behavior normal.         Thought Content: Thought content normal.         Judgment: Judgment normal.

## 2025-05-09 ENCOUNTER — TELEPHONE (OUTPATIENT)
Dept: DERMATOLOGY | Facility: CLINIC | Age: 60
End: 2025-05-09

## 2025-05-09 NOTE — TELEPHONE ENCOUNTER
LVM for PT re:WQ ref for atypical mole. PT can call to sched apt at any time. Closed ref as is not needed for ins/apt. Sent letter via [facility]

## 2025-06-16 ENCOUNTER — TELEPHONE (OUTPATIENT)
Age: 60
End: 2025-06-16

## 2025-06-20 ENCOUNTER — OFFICE VISIT (OUTPATIENT)
Dept: FAMILY MEDICINE CLINIC | Facility: CLINIC | Age: 60
End: 2025-06-20
Payer: COMMERCIAL

## 2025-06-20 VITALS
BODY MASS INDEX: 35.36 KG/M2 | WEIGHT: 220 LBS | HEART RATE: 87 BPM | TEMPERATURE: 98.2 F | DIASTOLIC BLOOD PRESSURE: 82 MMHG | OXYGEN SATURATION: 98 % | HEIGHT: 66 IN | SYSTOLIC BLOOD PRESSURE: 134 MMHG

## 2025-06-20 DIAGNOSIS — E11.9 TYPE 2 DIABETES MELLITUS WITHOUT COMPLICATION, WITHOUT LONG-TERM CURRENT USE OF INSULIN (HCC): ICD-10-CM

## 2025-06-20 DIAGNOSIS — E11.9 ENCOUNTER FOR DIABETIC FOOT EXAM (HCC): ICD-10-CM

## 2025-06-20 DIAGNOSIS — I10 PRIMARY HYPERTENSION: ICD-10-CM

## 2025-06-20 DIAGNOSIS — M62.08 RECTUS DIASTASIS: ICD-10-CM

## 2025-06-20 DIAGNOSIS — K42.9 UMBILICAL HERNIA WITHOUT OBSTRUCTION AND WITHOUT GANGRENE: ICD-10-CM

## 2025-06-20 DIAGNOSIS — N40.1 BENIGN PROSTATIC HYPERPLASIA WITH LOWER URINARY TRACT SYMPTOMS, SYMPTOM DETAILS UNSPECIFIED: Primary | ICD-10-CM

## 2025-06-20 DIAGNOSIS — G47.33 OSA (OBSTRUCTIVE SLEEP APNEA): ICD-10-CM

## 2025-06-20 DIAGNOSIS — E66.01 SEVERE OBESITY (BMI 35.0-35.9 WITH COMORBIDITY) (HCC): ICD-10-CM

## 2025-06-20 DIAGNOSIS — E11.9 TYPE 2 DIABETES MELLITUS WITHOUT COMPLICATION, WITHOUT LONG-TERM CURRENT USE OF INSULIN (HCC): Primary | ICD-10-CM

## 2025-06-20 LAB
CREAT UR-MCNC: 207.6 MG/DL
MICROALBUMIN UR-MCNC: 11.2 MG/L
MICROALBUMIN/CREAT 24H UR: 5 MG/G CREATININE (ref 0–30)

## 2025-06-20 PROCEDURE — 82043 UR ALBUMIN QUANTITATIVE: CPT | Performed by: FAMILY MEDICINE

## 2025-06-20 PROCEDURE — 99214 OFFICE O/P EST MOD 30 MIN: CPT | Performed by: FAMILY MEDICINE

## 2025-06-20 PROCEDURE — G2211 COMPLEX E/M VISIT ADD ON: HCPCS | Performed by: FAMILY MEDICINE

## 2025-06-20 PROCEDURE — 82570 ASSAY OF URINE CREATININE: CPT | Performed by: FAMILY MEDICINE

## 2025-06-20 RX ORDER — TAMSULOSIN HYDROCHLORIDE 0.4 MG/1
0.4 CAPSULE ORAL
Qty: 90 CAPSULE | Refills: 0 | Status: SHIPPED | OUTPATIENT
Start: 2025-06-20

## 2025-06-20 RX ORDER — LISINOPRIL 10 MG/1
10 TABLET ORAL DAILY
Qty: 90 TABLET | Refills: 1 | Status: SHIPPED | OUTPATIENT
Start: 2025-06-20

## 2025-06-20 RX ORDER — TIRZEPATIDE 2.5 MG/.5ML
2.5 INJECTION, SOLUTION SUBCUTANEOUS WEEKLY
Qty: 2 ML | Refills: 0 | Status: SHIPPED | OUTPATIENT
Start: 2025-06-20

## 2025-06-20 RX ORDER — TIRZEPATIDE 5 MG/.5ML
5 INJECTION, SOLUTION SUBCUTANEOUS WEEKLY
Qty: 2 ML | Refills: 0 | Status: SHIPPED | OUTPATIENT
Start: 2025-07-20

## 2025-06-20 RX ORDER — METFORMIN HYDROCHLORIDE 500 MG/1
500 TABLET, EXTENDED RELEASE ORAL 2 TIMES DAILY WITH MEALS
Qty: 180 TABLET | Refills: 1 | Status: SHIPPED | OUTPATIENT
Start: 2025-06-20

## 2025-06-20 NOTE — ASSESSMENT & PLAN NOTE
New per review of diagnoses in chart, but pt states he noticed this some time ago, does not bother him; will monitor

## 2025-06-20 NOTE — ASSESSMENT & PLAN NOTE
Controlled fairly well, cpm; anticipate BP will improve with weight loss on Mounjaro rx'd today  Orders:    Mounjaro 2.5 MG/0.5ML SOAJ; Inject 2.5 mg under the skin once a week    Mounjaro 5 MG/0.5ML SOAJ; Inject 5 mg under the skin once a week Do not start before July 20, 2025.

## 2025-06-20 NOTE — ASSESSMENT & PLAN NOTE
Pt no on CPAP  I am rxing trial Mounjaro today  Orders:    Mounjaro 2.5 MG/0.5ML SOAJ; Inject 2.5 mg under the skin once a week    Mounjaro 5 MG/0.5ML SOAJ; Inject 5 mg under the skin once a week Do not start before July 20, 2025.

## 2025-06-20 NOTE — ASSESSMENT & PLAN NOTE
Was started on flomax 2 months ago by his PCP, doing well, cpm  Orders:    tamsulosin (FLOMAX) 0.4 mg; Take 1 capsule (0.4 mg total) by mouth daily with dinner

## 2025-06-20 NOTE — PROGRESS NOTES
Name: Rafael Bradford      : 1965      MRN: [id number]  Encounter Provider: Shelley Sandoval DO  Encounter Date: 2025   Encounter department: [facility]  :  Assessment & Plan  Benign prostatic hyperplasia with lower urinary tract symptoms, symptom details unspecified  Was started on flomax 2 months ago by his PCP, doing well, cpm  Orders:  •  tamsulosin (FLOMAX) 0.4 mg; Take 1 capsule (0.4 mg total) by mouth daily with dinner    Type 2 diabetes mellitus without complication, without long-term current use of insulin (HCC)  Well controlled on metformin, but pt interested in weight management med - he has TRES and HTN as well - will start Mounjaro and likely d/c metformin second month, unless starting to get low bs's, then d/c metformin right away - pt does need bs testing supplies but is not sure if it is accucheck meter he has at home - will call back with name  Lab Results   Component Value Date    HGBA1C 6.6 (H) 2025       Orders:  •  Albumin / creatinine urine ratio  •  Mounjaro 2.5 MG/0.5ML SOAJ; Inject 2.5 mg under the skin once a week  •  Mounjaro 5 MG/0.5ML SOAJ; Inject 5 mg under the skin once a week Do not start before 2025.    Encounter for diabetic foot exam (HCC)         Severe obesity (BMI 35.0-35.9 with comorbidity) ([facility])      Orders:  •  Mounjaro 2.5 MG/0.5ML SOAJ; Inject 2.5 mg under the skin once a week  •  Mounjaro 5 MG/0.5ML SOAJ; Inject 5 mg under the skin once a week Do not start before 2025.    Primary hypertension  Controlled fairly well, cpm; anticipate BP will improve with weight loss on Mounjaro rx'd today  Orders:  •  Mounjaro 2.5 MG/0.5ML SOAJ; Inject 2.5 mg under the skin once a week  •  Mounjaro 5 MG/0.5ML SOAJ; Inject 5 mg under the skin once a week Do not start before 2025.    TRES (obstructive sleep apnea)  Pt no on CPAP  I am rxing trial Mounjaro today  Orders:  •  Mounjaro 2.5 MG/0.5ML SOAJ; Inject 2.5 mg under the skin  once a week  •  Mounjaro 5 MG/0.5ML SOAJ; Inject 5 mg under the skin once a week Do not start before July 20, 2025.    Umbilical hernia without obstruction and without gangrene  New per review of diagnoses in chart, but pt states he noticed this some time ago, does not bother him; will monitor       Rectus diastasis  Pt reassured that this does not need treatment or further eval         Chief Complaint   Patient presents with   • Follow-up          History of Present Illness   Scheduled f/u appt  This is the first time I am seeing this pt- he follows with other physician here  Was started on flomax  Has noticed at least 50% improvement - now sleeps well each night  Also interested in weight management options        4/8/2025  [facility]  Assessment & Plan  Type 2 diabetes mellitus without complication, without long-term current use of insulin ([facility])  Lab Results  Component Value Date    HGBA1C 6.6 (H) 03/26/2025  Controlled.  Continue metformin and diet and lifestyle modification.  Restart Lipitor.  Orders:  ·  metFORMIN (GLUCOPHAGE-XR) 500 mg 24 hr tablet; Take 1 tablet (500 mg total) by mouth 2 (two) times a day with meals  ·  atorvastatin (LIPITOR) 10 mg tablet; Take 1 tablet (10 mg total) by mouth daily  Benign prostatic hyperplasia with lower urinary tract symptoms, symptom details unspecified  Uncontrolled.  Having symptoms of hesitancy, dribbling and nocturia.  I recommend patient restart Flomax.  In the past he was taking this inconsistently.  I recommended compliance with daily dosing.  Follow-up in 8 weeks.  PSA was negative.  Orders:  ·  tamsulosin (FLOMAX) 0.4 mg; Take 1 capsule (0.4 mg total) by mouth daily with dinner  Primary hypertension  Controlled after restarting lisinopril.  Continue lisinopril 10 mg daily.  History of Present Illness  Patient presents office today for follow-up.  Had lab work done.  Last office visit we restarted his lisinopril.  He reports that he has been  "having trouble going the bathroom.  This is chronic but has not gotten any better.  In the past he was on Flomax but not taking every day.  He is having dribbling and hesitancy.  He is also waking up frequently at nighttime.  Blood pressures have been better at home after starting lisinopril.  No headaches or symptoms of elevated hypertension.            Review of Systems   Constitutional: Negative.    Respiratory: Negative.     Cardiovascular: Negative.    Gastrointestinal: Negative.    Neurological: Negative.    Hematological: Negative.        Objective   /82   Pulse 87   Temp 98.2 °F (36.8 °C) (Tympanic)   Ht 5' 6\" (1.676 m)   Wt 99.8 kg (220 lb)   SpO2 98%   BMI 35.51 kg/m²      Physical Exam  Vitals and nursing note reviewed.   Constitutional:       Appearance: Normal appearance. He is not ill-appearing, toxic-appearing or diaphoretic.   HENT:      Head: Normocephalic and atraumatic.      Mouth/Throat:      Mouth: Mucous membranes are moist.      Pharynx: Oropharynx is clear.     Cardiovascular:      Rate and Rhythm: Normal rate and regular rhythm.      Pulses: no weak pulses.           Dorsalis pedis pulses are 2+ on the right side and 2+ on the left side.        Posterior tibial pulses are 2+ on the right side and 2+ on the left side.      Heart sounds: Normal heart sounds.   Pulmonary:      Effort: Pulmonary effort is normal.      Breath sounds: Normal breath sounds and air entry.   Abdominal:      General: Bowel sounds are normal. There is no distension.      Palpations: Abdomen is soft. There is no hepatomegaly, splenomegaly or mass.      Tenderness: There is no abdominal tenderness.      Hernia: A hernia is present. Hernia is present in the umbilical area (small, nontender, easily reducible).     Musculoskeletal:      Right lower leg: No edema.      Left lower leg: No edema.   Feet:      Right foot:      Skin integrity: No ulcer, skin breakdown, erythema, warmth, callus or dry skin.      Left " foot:      Skin integrity: No ulcer, skin breakdown, erythema, warmth, callus or dry skin.     Skin:     General: Skin is warm and dry.      Coloration: Skin is not pale.     Neurological:      General: No focal deficit present.      Mental Status: He is alert and oriented to person, place, and time.      Gait: Gait normal.     Psychiatric:         Mood and Affect: Mood normal.         Behavior: Behavior normal. Behavior is cooperative.         Cognition and Memory: Cognition and memory normal.   Patient's shoes and socks removed.    Right Foot/Ankle   Right Foot Inspection  Skin Exam: skin normal and skin intact. No dry skin, no warmth, no callus, no erythema, no maceration, no abnormal color, no pre-ulcer, no ulcer and no callus.     Toe Exam: ROM and strength within normal limits.     Sensory   Monofilament testing: intact    Vascular  The right DP pulse is 2+. The right PT pulse is 2+.     Left Foot/Ankle  Left Foot Inspection  Skin Exam: skin normal and skin intact. No dry skin, no warmth, no erythema, no maceration, normal color, no pre-ulcer, no ulcer and no callus.     Toe Exam: ROM and strength within normal limits.     Sensory   Monofilament testing: intact    Vascular  The left DP pulse is 2+. The left PT pulse is 2+.     Assign Risk Category  No deformity present  No loss of protective sensation  No weak pulses  Risk: 0

## 2025-06-20 NOTE — ASSESSMENT & PLAN NOTE
{If prescribing weight loss medication, click here to fill out prior auth smartform and then hit F2 with this smartlist to insert prior auth documentation (Optional):[id number]}    Orders:    Mounjaro 2.5 MG/0.5ML SOAJ; Inject 2.5 mg under the skin once a week    Mounjaro 5 MG/0.5ML SOAJ; Inject 5 mg under the skin once a week Do not start before July 20, 2025.

## 2025-06-20 NOTE — ASSESSMENT & PLAN NOTE
Well controlled on metformin, but pt interested in weight management med - he has TRES and HTN as well - will start Mounjaro and likely d/c metformin second month, unless starting to get low bs's, then d/c metformin right away - pt does need bs testing supplies but is not sure if it is accucheck meter he has at home - will call back with name  Lab Results   Component Value Date    HGBA1C 6.6 (H) 03/26/2025       Orders:    Albumin / creatinine urine ratio    Mounjaro 2.5 MG/0.5ML SOAJ; Inject 2.5 mg under the skin once a week    Mounjaro 5 MG/0.5ML SOAJ; Inject 5 mg under the skin once a week Do not start before July 20, 2025.

## 2025-07-16 DIAGNOSIS — E11.9 TYPE 2 DIABETES MELLITUS WITHOUT COMPLICATION, WITHOUT LONG-TERM CURRENT USE OF INSULIN (HCC): ICD-10-CM

## 2025-07-16 RX ORDER — METFORMIN HYDROCHLORIDE 500 MG/1
500 TABLET, EXTENDED RELEASE ORAL 2 TIMES DAILY WITH MEALS
Qty: 180 TABLET | Refills: 1 | Status: SHIPPED | OUTPATIENT
Start: 2025-07-16

## 2025-07-16 NOTE — TELEPHONE ENCOUNTER
Reason for call:   [x] Refill   [] Prior Auth  [] Other:     Office:   [x] PCP/Provider - prev ordered by Dr Clifford, now rober Sandoval  [] Specialty/Provider -     Medication:     metFORMIN (GLUCOPHAGE-XR) 500 mg 24 hr tablet       Dose/Frequency:     Take 1 tablet (500 mg total) by mouth 2 (two) times a day with meals       Quantity: 180    Pharmacy: [address] [phone]     Local Pharmacy   Does the patient have enough for 3 days?   [] Yes   [x] No - Send as HP to POD    Mail Away Pharmacy   Does the patient have enough for 10 days?   [] Yes   [] No - Send as HP to POD

## 2025-07-17 ENCOUNTER — TELEPHONE (OUTPATIENT)
Dept: FAMILY MEDICINE CLINIC | Facility: CLINIC | Age: 60
End: 2025-07-17

## 2025-07-17 DIAGNOSIS — I10 PRIMARY HYPERTENSION: ICD-10-CM

## 2025-07-17 RX ORDER — LISINOPRIL 10 MG/1
10 TABLET ORAL DAILY
Qty: 90 TABLET | Refills: 1 | Status: SHIPPED | OUTPATIENT
Start: 2025-07-17

## 2025-07-17 NOTE — TELEPHONE ENCOUNTER
Changed pharmacy     Reason for call:   [x] Refill   [] Prior Auth  [] Other:     Office:   [x] PCP/Provider - [facility]  Authorized By: Skip Clifford MD    [] Specialty/Provider -     Medication:  lisinopril (ZESTRIL) 10 mg tablet    Dose/Frequency: Take 1 tablet (10 mg total) by mouth daily,     Quantity: 90    Pharmacy: [address] [facility]   Does the patient have enough for 3 days?   [] Yes   [x] No - Send as HP to POD    Mail Away Pharmacy   Does the patient have enough for 10 days?   [] Yes   [] No - Send as HP to POD

## (undated) DEVICE — 3M™ TEGADERM™ TRANSPARENT FILM DRESSING FRAME STYLE, 1624W, 2-3/8 IN X 2-3/4 IN (6 CM X 7 CM), 100/CT 4CT/CASE: Brand: 3M™ TEGADERM™

## (undated) DEVICE — [HIGH FLOW INSUFFLATOR,  DO NOT USE IF PACKAGE IS DAMAGED,  KEEP DRY,  KEEP AWAY FROM SUNLIGHT,  PROTECT FROM HEAT AND RADIOACTIVE SOURCES.]: Brand: PNEUMOSURE

## (undated) DEVICE — SUT MONOCRYL 4-0 PS-2 27 IN Y426H

## (undated) DEVICE — GLOVE INDICATOR PI UNDERGLOVE SZ 7 BLUE

## (undated) DEVICE — SCD SEQUENTIAL COMPRESSION COMFORT SLEEVE MEDIUM KNEE LENGTH: Brand: KENDALL SCD

## (undated) DEVICE — BASIC SINGLE BASIN 2-LF: Brand: MEDLINE INDUSTRIES, INC.

## (undated) DEVICE — 1820 FOAM BLOCK NEEDLE COUNTER: Brand: DEVON

## (undated) DEVICE — SPONGE LAP 18 X 18 IN

## (undated) DEVICE — TUBING SMOKE EVAC W/FILTRATION DEVICE PLUMEPORT ACTIV

## (undated) DEVICE — STRL COTTON TIP APPLCTR 6IN PK: Brand: CARDINAL HEALTH

## (undated) DEVICE — NEEDLE 25G X 1 1/2

## (undated) DEVICE — UTILITY MARKER,BLACK WITH LABELS: Brand: DEVON

## (undated) DEVICE — ENDOPATH 5 MM GRASPERS WITH RATCHET HANDLES: Brand: ENDOPATH

## (undated) DEVICE — LIGAMAX 5 MM ENDOSCOPIC MULTIPLE CLIP APPLIER: Brand: LIGAMAX

## (undated) DEVICE — GENERAL ENDOSCOPY PACK: Brand: CONVERTORS

## (undated) DEVICE — SYRINGE 10ML LL

## (undated) DEVICE — PAD GROUNDING ADULT

## (undated) DEVICE — TROCARS: Brand: KII® BALLOON BLUNT TIP SYSTEM

## (undated) DEVICE — PENCIL ELECTROSURG E-Z CLEAN -0035H

## (undated) DEVICE — 3M™ STERI-STRIP™ REINFORCED ADHESIVE SKIN CLOSURES, R1546, 1/4 IN X 4 IN (6 MM X 100 MM), 10 STRIPS/ENVELOPE: Brand: 3M™ STERI-STRIP™

## (undated) DEVICE — TROCAR: Brand: KII SLEEVE

## (undated) DEVICE — ENDOPATH 5MM CURVED SCISSORS WITH MONOPOLAR CAUTERY: Brand: ENDOPATH

## (undated) DEVICE — INTENDED FOR TISSUE SEPARATION, AND OTHER PROCEDURES THAT REQUIRE A SHARP SURGICAL BLADE TO PUNCTURE OR CUT.: Brand: BARD-PARKER SAFETY BLADES SIZE 15, STERILE

## (undated) DEVICE — LIGHT GLOVE GREEN

## (undated) DEVICE — DRAPE EQUIPMENT RF WAND

## (undated) DEVICE — GLOVE SRG BIOGEL ECLIPSE 7

## (undated) DEVICE — GAUZE SPONGES,8 PLY: Brand: CURITY

## (undated) DEVICE — TRANSPOSAL ULTRAFLEX DUO/QUAD ULTRA CART MANIFOLD

## (undated) DEVICE — SUT VICRYL 0 UR-6 27 IN J603H

## (undated) DEVICE — TROCAR: Brand: KII FIOS FIRST ENTRY

## (undated) DEVICE — PLUMEPEN PRO 10FT

## (undated) DEVICE — ELECTRODE LAP L WIRE E-Z CLEAN 33CM -0100

## (undated) DEVICE — SURGICEL SNOW 1 X 2 IN

## (undated) DEVICE — CHLORAPREP HI-LITE 26ML ORANGE

## (undated) DEVICE — ENDOPOUCH RETRIEVER SPECIMEN RETRIEVAL BAGS: Brand: ENDOPOUCH RETRIEVER

## (undated) DEVICE — 5 MM CURVED DISSECTORS WITH MONOPOLAR CAUTERY: Brand: ENDOPATH

## (undated) DEVICE — ANTI-FOG SOLUTION WITH FOAM PAD: Brand: DEVON

## (undated) DEVICE — IRRIG ENDO FLO TUBING